# Patient Record
Sex: FEMALE | Race: BLACK OR AFRICAN AMERICAN | NOT HISPANIC OR LATINO | URBAN - METROPOLITAN AREA
[De-identification: names, ages, dates, MRNs, and addresses within clinical notes are randomized per-mention and may not be internally consistent; named-entity substitution may affect disease eponyms.]

---

## 2017-04-18 ENCOUNTER — EMERGENCY (EMERGENCY)
Facility: HOSPITAL | Age: 42
LOS: 1 days | Discharge: HOME | End: 2017-04-18
Attending: EMERGENCY MEDICINE
Payer: COMMERCIAL

## 2017-04-18 VITALS
TEMPERATURE: 98 F | DIASTOLIC BLOOD PRESSURE: 91 MMHG | OXYGEN SATURATION: 98 % | WEIGHT: 225.09 LBS | HEIGHT: 64 IN | SYSTOLIC BLOOD PRESSURE: 124 MMHG | RESPIRATION RATE: 18 BRPM | HEART RATE: 96 BPM

## 2017-04-18 DIAGNOSIS — Z90.49 ACQUIRED ABSENCE OF OTHER SPECIFIED PARTS OF DIGESTIVE TRACT: ICD-10-CM

## 2017-04-18 DIAGNOSIS — R07.2 PRECORDIAL PAIN: ICD-10-CM

## 2017-04-18 DIAGNOSIS — I10 ESSENTIAL (PRIMARY) HYPERTENSION: ICD-10-CM

## 2017-04-18 DIAGNOSIS — I25.10 ATHEROSCLEROTIC HEART DISEASE OF NATIVE CORONARY ARTERY WITHOUT ANGINA PECTORIS: ICD-10-CM

## 2017-04-18 DIAGNOSIS — E78.00 PURE HYPERCHOLESTEROLEMIA, UNSPECIFIED: ICD-10-CM

## 2017-04-18 DIAGNOSIS — Z95.810 PRESENCE OF AUTOMATIC (IMPLANTABLE) CARDIAC DEFIBRILLATOR: ICD-10-CM

## 2017-04-18 DIAGNOSIS — E11.9 TYPE 2 DIABETES MELLITUS WITHOUT COMPLICATIONS: ICD-10-CM

## 2017-04-18 DIAGNOSIS — Z90.49 ACQUIRED ABSENCE OF OTHER SPECIFIED PARTS OF DIGESTIVE TRACT: Chronic | ICD-10-CM

## 2017-04-18 DIAGNOSIS — J45.909 UNSPECIFIED ASTHMA, UNCOMPLICATED: ICD-10-CM

## 2017-04-18 LAB
ALBUMIN SERPL ELPH-MCNC: 3.2 G/DL — LOW (ref 3.5–5)
ALP SERPL-CCNC: 66 U/L — SIGNIFICANT CHANGE UP (ref 40–120)
ALT FLD-CCNC: 26 U/L DA — SIGNIFICANT CHANGE UP (ref 10–60)
ANION GAP SERPL CALC-SCNC: 10 MMOL/L — SIGNIFICANT CHANGE UP (ref 5–17)
APTT BLD: 25.6 SEC — LOW (ref 27.5–37.4)
AST SERPL-CCNC: 29 U/L — SIGNIFICANT CHANGE UP (ref 10–40)
BASOPHILS # BLD AUTO: 0.1 K/UL — SIGNIFICANT CHANGE UP (ref 0–0.2)
BASOPHILS NFR BLD AUTO: 1.7 % — SIGNIFICANT CHANGE UP (ref 0–2)
BILIRUB SERPL-MCNC: 0.5 MG/DL — SIGNIFICANT CHANGE UP (ref 0.2–1.2)
BUN SERPL-MCNC: 22 MG/DL — HIGH (ref 7–18)
CALCIUM SERPL-MCNC: 8.6 MG/DL — SIGNIFICANT CHANGE UP (ref 8.4–10.5)
CHLORIDE SERPL-SCNC: 107 MMOL/L — SIGNIFICANT CHANGE UP (ref 96–108)
CK MB BLD-MCNC: 1.1 % — SIGNIFICANT CHANGE UP (ref 0–3.5)
CK MB CFR SERPL CALC: 1.4 NG/ML — SIGNIFICANT CHANGE UP (ref 0–3.6)
CK SERPL-CCNC: 129 U/L — SIGNIFICANT CHANGE UP (ref 21–215)
CO2 SERPL-SCNC: 21 MMOL/L — LOW (ref 22–31)
CREAT SERPL-MCNC: 0.96 MG/DL — SIGNIFICANT CHANGE UP (ref 0.5–1.3)
D DIMER BLD IA.RAPID-MCNC: <150 NG/ML DDU — SIGNIFICANT CHANGE UP
EOSINOPHIL # BLD AUTO: 0.2 K/UL — SIGNIFICANT CHANGE UP (ref 0–0.5)
EOSINOPHIL NFR BLD AUTO: 3.3 % — SIGNIFICANT CHANGE UP (ref 0–6)
GLUCOSE SERPL-MCNC: 188 MG/DL — HIGH (ref 70–99)
HCG UR QL: NEGATIVE — SIGNIFICANT CHANGE UP
HCT VFR BLD CALC: 35.4 % — SIGNIFICANT CHANGE UP (ref 34.5–45)
HGB BLD-MCNC: 11.6 G/DL — SIGNIFICANT CHANGE UP (ref 11.5–15.5)
INR BLD: 0.89 RATIO — SIGNIFICANT CHANGE UP (ref 0.88–1.16)
LYMPHOCYTES # BLD AUTO: 2 K/UL — SIGNIFICANT CHANGE UP (ref 1–3.3)
LYMPHOCYTES # BLD AUTO: 41.9 % — SIGNIFICANT CHANGE UP (ref 13–44)
MCHC RBC-ENTMCNC: 23.5 PG — LOW (ref 27–34)
MCHC RBC-ENTMCNC: 32.8 GM/DL — SIGNIFICANT CHANGE UP (ref 32–36)
MCV RBC AUTO: 71.8 FL — LOW (ref 80–100)
MONOCYTES # BLD AUTO: 0.3 K/UL — SIGNIFICANT CHANGE UP (ref 0–0.9)
MONOCYTES NFR BLD AUTO: 6.3 % — SIGNIFICANT CHANGE UP (ref 2–14)
NEUTROPHILS # BLD AUTO: 2.3 K/UL — SIGNIFICANT CHANGE UP (ref 1.8–7.4)
NEUTROPHILS NFR BLD AUTO: 46.8 % — SIGNIFICANT CHANGE UP (ref 43–77)
PLATELET # BLD AUTO: 288 K/UL — SIGNIFICANT CHANGE UP (ref 150–400)
POTASSIUM SERPL-MCNC: 4.9 MMOL/L — SIGNIFICANT CHANGE UP (ref 3.5–5.3)
POTASSIUM SERPL-SCNC: 4.9 MMOL/L — SIGNIFICANT CHANGE UP (ref 3.5–5.3)
PROT SERPL-MCNC: 7.5 G/DL — SIGNIFICANT CHANGE UP (ref 6–8.3)
PROTHROM AB SERPL-ACNC: 9.7 SEC — LOW (ref 9.8–12.7)
RBC # BLD: 4.93 M/UL — SIGNIFICANT CHANGE UP (ref 3.8–5.2)
RBC # FLD: 14.3 % — SIGNIFICANT CHANGE UP (ref 10.3–14.5)
SODIUM SERPL-SCNC: 138 MMOL/L — SIGNIFICANT CHANGE UP (ref 135–145)
TROPONIN I SERPL-MCNC: <0.015 NG/ML — SIGNIFICANT CHANGE UP (ref 0–0.04)
TROPONIN I SERPL-MCNC: <0.015 NG/ML — SIGNIFICANT CHANGE UP (ref 0–0.04)
WBC # BLD: 4.8 K/UL — SIGNIFICANT CHANGE UP (ref 3.8–10.5)
WBC # FLD AUTO: 4.8 K/UL — SIGNIFICANT CHANGE UP (ref 3.8–10.5)

## 2017-04-18 PROCEDURE — 99285 EMERGENCY DEPT VISIT HI MDM: CPT

## 2017-04-18 PROCEDURE — 71275 CT ANGIOGRAPHY CHEST: CPT | Mod: 26

## 2017-04-18 RX ORDER — ASPIRIN/CALCIUM CARB/MAGNESIUM 324 MG
81 TABLET ORAL ONCE
Qty: 0 | Refills: 0 | Status: COMPLETED | OUTPATIENT
Start: 2017-04-18 | End: 2017-04-18

## 2017-04-18 RX ORDER — MORPHINE SULFATE 50 MG/1
6 CAPSULE, EXTENDED RELEASE ORAL ONCE
Qty: 0 | Refills: 0 | Status: DISCONTINUED | OUTPATIENT
Start: 2017-04-18 | End: 2017-04-18

## 2017-04-18 RX ORDER — ONDANSETRON 8 MG/1
1 TABLET, FILM COATED ORAL
Qty: 12 | Refills: 0 | OUTPATIENT
Start: 2017-04-18

## 2017-04-18 RX ADMIN — MORPHINE SULFATE 6 MILLIGRAM(S): 50 CAPSULE, EXTENDED RELEASE ORAL at 23:59

## 2017-04-18 RX ADMIN — MORPHINE SULFATE 6 MILLIGRAM(S): 50 CAPSULE, EXTENDED RELEASE ORAL at 18:43

## 2017-04-18 NOTE — ED ADULT NURSE REASSESSMENT NOTE - NS ED NURSE REASSESS COMMENT FT1
2121 - completed  ct scan of the chest tolerated procedure well.
2055 pm - pt awaiting for ct scan of the chest with IV

## 2017-04-18 NOTE — ED ADULT NURSE NOTE - OBJECTIVE STATEMENT
pt came in with c/o chest pain started yesterday then went away last night . then today pain came back midsternal pain radiating to her  rt arm . pt states shes also coughing with white phlegm x 2 days. clear breath sound with equal chest expansion no signs of any distress

## 2017-04-18 NOTE — ED ADULT NURSE NOTE - PSH
AICD (automatic cardioverter/defibrillator) present  2013  Benign carcinoid tumor of appendix    Benign carcinoid tumor of appendix    Coronary heart disease  s/p AICD PLACEMENT  Gallbladder obstruction    Gallbladder obstruction    History of cholecystectomy    S/P appendectomy    S/P cardiac cath  s/p stents 2011  S/P cholecystectomy    S/P ICD (internal cardiac defibrillator) procedure

## 2017-04-18 NOTE — ED PROVIDER NOTE - MEDICAL DECISION MAKING DETAILS
Case dw pts cardiologist Dr Leger, informed pt with normal recent cardiac cath stable for discharge and will f/u with pt in office. Pt is well appearing walking with normal gait, stable for discharge and follow up with medical doctor. Pt educated on care and need for follow up. Discussed anticipatory guidance and return precautions. Questions answered. I had a detailed discussion with the patient and/or guardian regarding the historical points, exam findings, and any diagnostic results supporting the discharge diagnosis.

## 2017-04-18 NOTE — ED ADULT NURSE NOTE - PMH
AICD (automatic cardioverter/defibrillator) (Medtronic)    AICD (automatic cardioverter/defibrillator) present    Asthma    Asthma    CAD (coronary artery disease)    CAD (coronary artery disease)  s/p 3 stents, last stent in may 2014  Cardiomyopathy    Cardiomyopathy    Diabetes    DM (diabetes mellitus)    DM type 2 (diabetes mellitus, type 2)    Hepatitis B    HLD (hyperlipidemia)    HTN (hypertension)    HTN (hypertension)    Hypercholesterolemia    Hypertension    PE (pulmonary embolism)    Personal history of PE (pulmonary embolism)    Postpartum cardiomyopathy    Stented coronary artery

## 2017-04-18 NOTE — ED PROVIDER NOTE - OBJECTIVE STATEMENT
41 y/o female with PMHx of PE, AICD, Asthma, CAD, Cardiomyopathy (with ejection fraction of 20%) presents to the ED for mid sternal CP x 2 days. Pt denies fever, chills, SOB, vomiting, or any other complaints. NKDA. Meds: Eliquis, Coreg, Lisinopril, Metformin, Norvasc. PMD: Dr. Velazquez.

## 2017-04-18 NOTE — ED PROVIDER NOTE - NS ED MD SCRIBE ATTENDING SCRIBE SECTIONS
HIV/VITAL SIGNS( Pullset)/HISTORY OF PRESENT ILLNESS/PROGRESS NOTE/DISPOSITION/REVIEW OF SYSTEMS/PHYSICAL EXAM

## 2017-04-19 PROCEDURE — 80053 COMPREHEN METABOLIC PANEL: CPT

## 2017-04-19 PROCEDURE — 81025 URINE PREGNANCY TEST: CPT

## 2017-04-19 PROCEDURE — 84702 CHORIONIC GONADOTROPIN TEST: CPT

## 2017-04-19 PROCEDURE — 85610 PROTHROMBIN TIME: CPT

## 2017-04-19 PROCEDURE — 85027 COMPLETE CBC AUTOMATED: CPT

## 2017-04-19 PROCEDURE — 99284 EMERGENCY DEPT VISIT MOD MDM: CPT | Mod: 25

## 2017-04-19 PROCEDURE — 82550 ASSAY OF CK (CPK): CPT

## 2017-04-19 PROCEDURE — 96374 THER/PROPH/DIAG INJ IV PUSH: CPT

## 2017-04-19 PROCEDURE — 82553 CREATINE MB FRACTION: CPT

## 2017-04-19 PROCEDURE — 93005 ELECTROCARDIOGRAM TRACING: CPT

## 2017-04-19 PROCEDURE — 85379 FIBRIN DEGRADATION QUANT: CPT

## 2017-04-19 PROCEDURE — 85730 THROMBOPLASTIN TIME PARTIAL: CPT

## 2017-04-19 PROCEDURE — 71275 CT ANGIOGRAPHY CHEST: CPT

## 2017-04-19 PROCEDURE — 84484 ASSAY OF TROPONIN QUANT: CPT

## 2017-04-19 RX ORDER — ONDANSETRON 8 MG/1
4 TABLET, FILM COATED ORAL ONCE
Qty: 0 | Refills: 0 | Status: COMPLETED | OUTPATIENT
Start: 2017-04-19 | End: 2017-04-19

## 2017-04-19 RX ADMIN — ONDANSETRON 4 MILLIGRAM(S): 8 TABLET, FILM COATED ORAL at 00:03

## 2017-04-19 RX ADMIN — Medication 81 MILLIGRAM(S): at 00:02

## 2017-04-30 ENCOUNTER — INPATIENT (INPATIENT)
Facility: HOSPITAL | Age: 42
LOS: 0 days | Discharge: HOME | End: 2017-05-01
Attending: INTERNAL MEDICINE

## 2017-04-30 DIAGNOSIS — Z90.49 ACQUIRED ABSENCE OF OTHER SPECIFIED PARTS OF DIGESTIVE TRACT: Chronic | ICD-10-CM

## 2017-04-30 DIAGNOSIS — I50.1 LEFT VENTRICULAR FAILURE, UNSPECIFIED: ICD-10-CM

## 2017-05-23 ENCOUNTER — EMERGENCY (EMERGENCY)
Facility: HOSPITAL | Age: 42
LOS: 1 days | Discharge: HOME | End: 2017-05-23
Attending: EMERGENCY MEDICINE
Payer: COMMERCIAL

## 2017-05-23 VITALS
OXYGEN SATURATION: 100 % | HEART RATE: 104 BPM | DIASTOLIC BLOOD PRESSURE: 87 MMHG | WEIGHT: 240.08 LBS | RESPIRATION RATE: 20 BRPM | HEIGHT: 66 IN | TEMPERATURE: 98 F | SYSTOLIC BLOOD PRESSURE: 132 MMHG

## 2017-05-23 VITALS
HEART RATE: 96 BPM | DIASTOLIC BLOOD PRESSURE: 86 MMHG | RESPIRATION RATE: 20 BRPM | SYSTOLIC BLOOD PRESSURE: 126 MMHG | TEMPERATURE: 98 F | OXYGEN SATURATION: 100 %

## 2017-05-23 DIAGNOSIS — Z90.49 ACQUIRED ABSENCE OF OTHER SPECIFIED PARTS OF DIGESTIVE TRACT: Chronic | ICD-10-CM

## 2017-05-23 LAB
ALBUMIN SERPL ELPH-MCNC: 3.6 G/DL — SIGNIFICANT CHANGE UP (ref 3.5–5)
ALP SERPL-CCNC: 75 U/L — SIGNIFICANT CHANGE UP (ref 40–120)
ALT FLD-CCNC: 24 U/L DA — SIGNIFICANT CHANGE UP (ref 10–60)
ANION GAP SERPL CALC-SCNC: 10 MMOL/L — SIGNIFICANT CHANGE UP (ref 5–17)
APPEARANCE UR: ABNORMAL
APTT BLD: 23.7 SEC — LOW (ref 27.5–37.4)
AST SERPL-CCNC: 16 U/L — SIGNIFICANT CHANGE UP (ref 10–40)
BASOPHILS # BLD AUTO: 0.1 K/UL — SIGNIFICANT CHANGE UP (ref 0–0.2)
BASOPHILS NFR BLD AUTO: 1.5 % — SIGNIFICANT CHANGE UP (ref 0–2)
BILIRUB SERPL-MCNC: 0.8 MG/DL — SIGNIFICANT CHANGE UP (ref 0.2–1.2)
BILIRUB UR-MCNC: NEGATIVE — SIGNIFICANT CHANGE UP
BUN SERPL-MCNC: 17 MG/DL — SIGNIFICANT CHANGE UP (ref 7–18)
CALCIUM SERPL-MCNC: 9.3 MG/DL — SIGNIFICANT CHANGE UP (ref 8.4–10.5)
CHLORIDE SERPL-SCNC: 104 MMOL/L — SIGNIFICANT CHANGE UP (ref 96–108)
CO2 SERPL-SCNC: 25 MMOL/L — SIGNIFICANT CHANGE UP (ref 22–31)
COLOR SPEC: YELLOW — SIGNIFICANT CHANGE UP
CREAT SERPL-MCNC: 1.01 MG/DL — SIGNIFICANT CHANGE UP (ref 0.5–1.3)
DIFF PNL FLD: ABNORMAL
EOSINOPHIL # BLD AUTO: 0.2 K/UL — SIGNIFICANT CHANGE UP (ref 0–0.5)
EOSINOPHIL NFR BLD AUTO: 3.1 % — SIGNIFICANT CHANGE UP (ref 0–6)
GLUCOSE SERPL-MCNC: 186 MG/DL — HIGH (ref 70–99)
GLUCOSE UR QL: NEGATIVE — SIGNIFICANT CHANGE UP
HCG UR QL: NEGATIVE — SIGNIFICANT CHANGE UP
HCT VFR BLD CALC: 37.8 % — SIGNIFICANT CHANGE UP (ref 34.5–45)
HGB BLD-MCNC: 12.1 G/DL — SIGNIFICANT CHANGE UP (ref 11.5–15.5)
INR BLD: 0.95 RATIO — SIGNIFICANT CHANGE UP (ref 0.88–1.16)
KETONES UR-MCNC: NEGATIVE — SIGNIFICANT CHANGE UP
LACTATE SERPL-SCNC: 2.5 MMOL/L — HIGH (ref 0.7–2)
LEUKOCYTE ESTERASE UR-ACNC: ABNORMAL
LYMPHOCYTES # BLD AUTO: 2.2 K/UL — SIGNIFICANT CHANGE UP (ref 1–3.3)
LYMPHOCYTES # BLD AUTO: 35.9 % — SIGNIFICANT CHANGE UP (ref 13–44)
MCHC RBC-ENTMCNC: 23.4 PG — LOW (ref 27–34)
MCHC RBC-ENTMCNC: 32 GM/DL — SIGNIFICANT CHANGE UP (ref 32–36)
MCV RBC AUTO: 73.2 FL — LOW (ref 80–100)
MONOCYTES # BLD AUTO: 0.3 K/UL — SIGNIFICANT CHANGE UP (ref 0–0.9)
MONOCYTES NFR BLD AUTO: 4.6 % — SIGNIFICANT CHANGE UP (ref 2–14)
NEUTROPHILS # BLD AUTO: 3.3 K/UL — SIGNIFICANT CHANGE UP (ref 1.8–7.4)
NEUTROPHILS NFR BLD AUTO: 54.9 % — SIGNIFICANT CHANGE UP (ref 43–77)
NITRITE UR-MCNC: NEGATIVE — SIGNIFICANT CHANGE UP
PH UR: 6 — SIGNIFICANT CHANGE UP (ref 5–8)
PLATELET # BLD AUTO: 305 K/UL — SIGNIFICANT CHANGE UP (ref 150–400)
POTASSIUM SERPL-MCNC: 4.1 MMOL/L — SIGNIFICANT CHANGE UP (ref 3.5–5.3)
POTASSIUM SERPL-SCNC: 4.1 MMOL/L — SIGNIFICANT CHANGE UP (ref 3.5–5.3)
PROT SERPL-MCNC: 7.7 G/DL — SIGNIFICANT CHANGE UP (ref 6–8.3)
PROT UR-MCNC: 100
PROTHROM AB SERPL-ACNC: 10.3 SEC — SIGNIFICANT CHANGE UP (ref 9.8–12.7)
RBC # BLD: 5.17 M/UL — SIGNIFICANT CHANGE UP (ref 3.8–5.2)
RBC # FLD: 13.8 % — SIGNIFICANT CHANGE UP (ref 10.3–14.5)
SODIUM SERPL-SCNC: 139 MMOL/L — SIGNIFICANT CHANGE UP (ref 135–145)
SP GR SPEC: 1.02 — SIGNIFICANT CHANGE UP (ref 1.01–1.02)
UROBILINOGEN FLD QL: NEGATIVE — SIGNIFICANT CHANGE UP
WBC # BLD: 6 K/UL — SIGNIFICANT CHANGE UP (ref 3.8–10.5)
WBC # FLD AUTO: 6 K/UL — SIGNIFICANT CHANGE UP (ref 3.8–10.5)

## 2017-05-23 PROCEDURE — 85027 COMPLETE CBC AUTOMATED: CPT

## 2017-05-23 PROCEDURE — 85610 PROTHROMBIN TIME: CPT

## 2017-05-23 PROCEDURE — 74176 CT ABD & PELVIS W/O CONTRAST: CPT

## 2017-05-23 PROCEDURE — 87086 URINE CULTURE/COLONY COUNT: CPT

## 2017-05-23 PROCEDURE — 83605 ASSAY OF LACTIC ACID: CPT

## 2017-05-23 PROCEDURE — 74176 CT ABD & PELVIS W/O CONTRAST: CPT | Mod: 26

## 2017-05-23 PROCEDURE — 96375 TX/PRO/DX INJ NEW DRUG ADDON: CPT

## 2017-05-23 PROCEDURE — 96374 THER/PROPH/DIAG INJ IV PUSH: CPT

## 2017-05-23 PROCEDURE — 85730 THROMBOPLASTIN TIME PARTIAL: CPT

## 2017-05-23 PROCEDURE — 99285 EMERGENCY DEPT VISIT HI MDM: CPT | Mod: 25

## 2017-05-23 PROCEDURE — 81001 URINALYSIS AUTO W/SCOPE: CPT

## 2017-05-23 PROCEDURE — 80053 COMPREHEN METABOLIC PANEL: CPT

## 2017-05-23 PROCEDURE — 99285 EMERGENCY DEPT VISIT HI MDM: CPT

## 2017-05-23 PROCEDURE — 87186 SC STD MICRODIL/AGAR DIL: CPT

## 2017-05-23 PROCEDURE — 81025 URINE PREGNANCY TEST: CPT

## 2017-05-23 PROCEDURE — 99284 EMERGENCY DEPT VISIT MOD MDM: CPT | Mod: 25

## 2017-05-23 RX ORDER — CEFPODOXIME PROXETIL 100 MG
1 TABLET ORAL
Qty: 28 | Refills: 0 | OUTPATIENT
Start: 2017-05-23 | End: 2017-06-06

## 2017-05-23 RX ORDER — MORPHINE SULFATE 50 MG/1
4 CAPSULE, EXTENDED RELEASE ORAL ONCE
Qty: 0 | Refills: 0 | Status: DISCONTINUED | OUTPATIENT
Start: 2017-05-23 | End: 2017-05-23

## 2017-05-23 RX ORDER — ONDANSETRON 8 MG/1
4 TABLET, FILM COATED ORAL ONCE
Qty: 0 | Refills: 0 | Status: COMPLETED | OUTPATIENT
Start: 2017-05-23 | End: 2017-05-23

## 2017-05-23 RX ORDER — CEFTRIAXONE 500 MG/1
1 INJECTION, POWDER, FOR SOLUTION INTRAMUSCULAR; INTRAVENOUS ONCE
Qty: 0 | Refills: 0 | Status: COMPLETED | OUTPATIENT
Start: 2017-05-23 | End: 2017-05-23

## 2017-05-23 RX ORDER — ONDANSETRON 8 MG/1
1 TABLET, FILM COATED ORAL
Qty: 12 | Refills: 0 | OUTPATIENT
Start: 2017-05-23

## 2017-05-23 RX ORDER — SODIUM CHLORIDE 9 MG/ML
2000 INJECTION INTRAMUSCULAR; INTRAVENOUS; SUBCUTANEOUS ONCE
Qty: 0 | Refills: 0 | Status: COMPLETED | OUTPATIENT
Start: 2017-05-23 | End: 2017-05-23

## 2017-05-23 RX ADMIN — CEFTRIAXONE 100 GRAM(S): 500 INJECTION, POWDER, FOR SOLUTION INTRAMUSCULAR; INTRAVENOUS at 15:56

## 2017-05-23 RX ADMIN — MORPHINE SULFATE 4 MILLIGRAM(S): 50 CAPSULE, EXTENDED RELEASE ORAL at 15:56

## 2017-05-23 RX ADMIN — SODIUM CHLORIDE 2000 MILLILITER(S): 9 INJECTION INTRAMUSCULAR; INTRAVENOUS; SUBCUTANEOUS at 14:46

## 2017-05-23 RX ADMIN — ONDANSETRON 4 MILLIGRAM(S): 8 TABLET, FILM COATED ORAL at 14:47

## 2017-05-23 RX ADMIN — MORPHINE SULFATE 4 MILLIGRAM(S): 50 CAPSULE, EXTENDED RELEASE ORAL at 14:47

## 2017-05-23 NOTE — ED PROVIDER NOTE - PROGRESS NOTE DETAILS
CT a/p non-acute, shows "Small groundglass pulmonary density in the left lower lobe. Tiny left   lower lobe lung nodule is also noted." in addition to kdiney lesions, printed result provided to pt. d/c'd home with rx for cefpodoxime, zofran

## 2017-05-23 NOTE — ED PROVIDER NOTE - NS ED ROS FT
ROS: GENERAL: no fevers, no chills HEENT: no epistaxis, no eye pain, no ear, no throat pain CARDIAC: no chest pain, no shortness of breath PULM: no cough, no shortness of breath GI: +nausea, no vomiting, +diarrhea,  no abdominal pain, no hematemesis, no bright red blood per rectum : no dysuria, no hematuria, +urgency EXTREMITIES: no arm pain, no leg pain, +flank pain SKIN: no purpura, no petechiae NEURO: no headache, no neck pain, no loss of strength/sensation HEME: no easy bruising, no easy bleeding

## 2017-05-23 NOTE — ED PROVIDER NOTE - PHYSICAL EXAMINATION
PE: CONSTITUTIONAL: Well appearing, well nourished, in milddistress. ENMT: Airway patent, nasal mucosa clear, mouth with normal mucosa. HEAD: NCAT EYES: PERRL, EOMI CARDIAC: RRR, no m/r/g, no pedal edema RESPIRATORY: CTA b/l, no adventitious sounds GI: Abdomen non-distended, non-tender MSK: Spine appears normal, range of motion is not limited, L CVAT NEURO: CNII-XII grossly intact, 5/5 strength, full sensation all extremities, gait intact SKIN: No rash

## 2017-05-23 NOTE — ED PROVIDER NOTE - MEDICAL DECISION MAKING DETAILS
42f pmhx asthma, CAD, HTN, HLD, DM, PE, carcinoid Ca s/p resection p/w L flank pain, urgency. pt states began having urgency and mild dysuria 5 days ago, then developed fever and L flank pain a/w nausea over weekend. L flank, constant, severe, radiating to LLQ at times. endorses diarrhea and gas this AM. on PE, VSS, in mild distress, RRR, CTA b/l, abdo soft, non-TTP, L CVAT. will obtain basics, UA/UCx, CT a/p, morphine/zofran

## 2017-05-26 LAB
-  AMPICILLIN/SULBACTAM: SIGNIFICANT CHANGE UP
-  CEFAZOLIN: SIGNIFICANT CHANGE UP
-  CIPROFLOXACIN: SIGNIFICANT CHANGE UP
-  GENTAMICIN: SIGNIFICANT CHANGE UP
-  LEVOFLOXACIN: SIGNIFICANT CHANGE UP
-  NITROFURANTOIN: SIGNIFICANT CHANGE UP
-  OXACILLIN: SIGNIFICANT CHANGE UP
-  PENICILLIN: SIGNIFICANT CHANGE UP
-  RIFAMPIN: SIGNIFICANT CHANGE UP
-  TETRACYCLINE: SIGNIFICANT CHANGE UP
-  TRIMETHOPRIM/SULFAMETHOXAZOLE: SIGNIFICANT CHANGE UP
-  VANCOMYCIN: SIGNIFICANT CHANGE UP
CULTURE RESULTS: SIGNIFICANT CHANGE UP
METHOD TYPE: SIGNIFICANT CHANGE UP
ORGANISM # SPEC MICROSCOPIC CNT: SIGNIFICANT CHANGE UP
ORGANISM # SPEC MICROSCOPIC CNT: SIGNIFICANT CHANGE UP
SPECIMEN SOURCE: SIGNIFICANT CHANGE UP

## 2017-05-27 DIAGNOSIS — Z86.19 PERSONAL HISTORY OF OTHER INFECTIOUS AND PARASITIC DISEASES: ICD-10-CM

## 2017-05-27 DIAGNOSIS — I25.10 ATHEROSCLEROTIC HEART DISEASE OF NATIVE CORONARY ARTERY WITHOUT ANGINA PECTORIS: ICD-10-CM

## 2017-05-27 DIAGNOSIS — Z95.810 PRESENCE OF AUTOMATIC (IMPLANTABLE) CARDIAC DEFIBRILLATOR: ICD-10-CM

## 2017-05-27 DIAGNOSIS — I10 ESSENTIAL (PRIMARY) HYPERTENSION: ICD-10-CM

## 2017-05-27 DIAGNOSIS — Z88.1 ALLERGY STATUS TO OTHER ANTIBIOTIC AGENTS STATUS: ICD-10-CM

## 2017-05-27 DIAGNOSIS — N12 TUBULO-INTERSTITIAL NEPHRITIS, NOT SPECIFIED AS ACUTE OR CHRONIC: ICD-10-CM

## 2017-05-27 DIAGNOSIS — Z86.711 PERSONAL HISTORY OF PULMONARY EMBOLISM: ICD-10-CM

## 2017-05-27 DIAGNOSIS — E11.9 TYPE 2 DIABETES MELLITUS WITHOUT COMPLICATIONS: ICD-10-CM

## 2017-06-02 ENCOUNTER — INPATIENT (INPATIENT)
Facility: HOSPITAL | Age: 42
LOS: 2 days | Discharge: HOME | DRG: 690 | End: 2017-06-05
Attending: FAMILY MEDICINE | Admitting: FAMILY MEDICINE
Payer: COMMERCIAL

## 2017-06-02 VITALS
DIASTOLIC BLOOD PRESSURE: 89 MMHG | OXYGEN SATURATION: 100 % | RESPIRATION RATE: 17 BRPM | WEIGHT: 240.08 LBS | HEART RATE: 100 BPM | TEMPERATURE: 98 F | SYSTOLIC BLOOD PRESSURE: 133 MMHG | HEIGHT: 66 IN

## 2017-06-02 DIAGNOSIS — I25.10 ATHEROSCLEROTIC HEART DISEASE OF NATIVE CORONARY ARTERY WITHOUT ANGINA PECTORIS: ICD-10-CM

## 2017-06-02 DIAGNOSIS — I42.9 CARDIOMYOPATHY, UNSPECIFIED: ICD-10-CM

## 2017-06-02 DIAGNOSIS — N12 TUBULO-INTERSTITIAL NEPHRITIS, NOT SPECIFIED AS ACUTE OR CHRONIC: ICD-10-CM

## 2017-06-02 DIAGNOSIS — Z86.711 PERSONAL HISTORY OF PULMONARY EMBOLISM: ICD-10-CM

## 2017-06-02 DIAGNOSIS — J45.909 UNSPECIFIED ASTHMA, UNCOMPLICATED: ICD-10-CM

## 2017-06-02 DIAGNOSIS — Z90.49 ACQUIRED ABSENCE OF OTHER SPECIFIED PARTS OF DIGESTIVE TRACT: Chronic | ICD-10-CM

## 2017-06-02 DIAGNOSIS — E11.9 TYPE 2 DIABETES MELLITUS WITHOUT COMPLICATIONS: ICD-10-CM

## 2017-06-02 DIAGNOSIS — Z29.9 ENCOUNTER FOR PROPHYLACTIC MEASURES, UNSPECIFIED: ICD-10-CM

## 2017-06-02 LAB
ALBUMIN SERPL ELPH-MCNC: 3.2 G/DL — LOW (ref 3.5–5)
ALP SERPL-CCNC: 74 U/L — SIGNIFICANT CHANGE UP (ref 40–120)
ALT FLD-CCNC: 23 U/L DA — SIGNIFICANT CHANGE UP (ref 10–60)
ANION GAP SERPL CALC-SCNC: 10 MMOL/L — SIGNIFICANT CHANGE UP (ref 5–17)
APPEARANCE UR: CLEAR — SIGNIFICANT CHANGE UP
AST SERPL-CCNC: 12 U/L — SIGNIFICANT CHANGE UP (ref 10–40)
BASOPHILS # BLD AUTO: 0.1 K/UL — SIGNIFICANT CHANGE UP (ref 0–0.2)
BASOPHILS NFR BLD AUTO: 1 % — SIGNIFICANT CHANGE UP (ref 0–2)
BILIRUB SERPL-MCNC: 0.6 MG/DL — SIGNIFICANT CHANGE UP (ref 0.2–1.2)
BILIRUB UR-MCNC: NEGATIVE — SIGNIFICANT CHANGE UP
BUN SERPL-MCNC: 13 MG/DL — SIGNIFICANT CHANGE UP (ref 7–18)
CALCIUM SERPL-MCNC: 8.7 MG/DL — SIGNIFICANT CHANGE UP (ref 8.4–10.5)
CHLORIDE SERPL-SCNC: 102 MMOL/L — SIGNIFICANT CHANGE UP (ref 96–108)
CO2 SERPL-SCNC: 22 MMOL/L — SIGNIFICANT CHANGE UP (ref 22–31)
COLOR SPEC: YELLOW — SIGNIFICANT CHANGE UP
CREAT SERPL-MCNC: 0.96 MG/DL — SIGNIFICANT CHANGE UP (ref 0.5–1.3)
DIFF PNL FLD: NEGATIVE — SIGNIFICANT CHANGE UP
EOSINOPHIL # BLD AUTO: 0.2 K/UL — SIGNIFICANT CHANGE UP (ref 0–0.5)
EOSINOPHIL NFR BLD AUTO: 4.2 % — SIGNIFICANT CHANGE UP (ref 0–6)
GLUCOSE SERPL-MCNC: 327 MG/DL — HIGH (ref 70–99)
GLUCOSE UR QL: 1000 MG/DL
HCG UR QL: NEGATIVE — SIGNIFICANT CHANGE UP
HCT VFR BLD CALC: 34.1 % — LOW (ref 34.5–45)
HGB BLD-MCNC: 11.3 G/DL — LOW (ref 11.5–15.5)
KETONES UR-MCNC: ABNORMAL
LEUKOCYTE ESTERASE UR-ACNC: NEGATIVE — SIGNIFICANT CHANGE UP
LIDOCAIN IGE QN: 255 U/L — SIGNIFICANT CHANGE UP (ref 73–393)
LYMPHOCYTES # BLD AUTO: 1.4 K/UL — SIGNIFICANT CHANGE UP (ref 1–3.3)
LYMPHOCYTES # BLD AUTO: 29.1 % — SIGNIFICANT CHANGE UP (ref 13–44)
MCHC RBC-ENTMCNC: 24.9 PG — LOW (ref 27–34)
MCHC RBC-ENTMCNC: 33.1 GM/DL — SIGNIFICANT CHANGE UP (ref 32–36)
MCV RBC AUTO: 75.1 FL — LOW (ref 80–100)
MONOCYTES # BLD AUTO: 0.3 K/UL — SIGNIFICANT CHANGE UP (ref 0–0.9)
MONOCYTES NFR BLD AUTO: 6.8 % — SIGNIFICANT CHANGE UP (ref 2–14)
NEUTROPHILS # BLD AUTO: 2.9 K/UL — SIGNIFICANT CHANGE UP (ref 1.8–7.4)
NEUTROPHILS NFR BLD AUTO: 58.9 % — SIGNIFICANT CHANGE UP (ref 43–77)
NITRITE UR-MCNC: NEGATIVE — SIGNIFICANT CHANGE UP
PH UR: 6 — SIGNIFICANT CHANGE UP (ref 5–8)
PLATELET # BLD AUTO: 202 K/UL — SIGNIFICANT CHANGE UP (ref 150–400)
POTASSIUM SERPL-MCNC: 4.2 MMOL/L — SIGNIFICANT CHANGE UP (ref 3.5–5.3)
POTASSIUM SERPL-SCNC: 4.2 MMOL/L — SIGNIFICANT CHANGE UP (ref 3.5–5.3)
PROT SERPL-MCNC: 7.2 G/DL — SIGNIFICANT CHANGE UP (ref 6–8.3)
PROT UR-MCNC: 30 MG/DL
RBC # BLD: 4.54 M/UL — SIGNIFICANT CHANGE UP (ref 3.8–5.2)
RBC # FLD: 14.3 % — SIGNIFICANT CHANGE UP (ref 10.3–14.5)
SODIUM SERPL-SCNC: 134 MMOL/L — LOW (ref 135–145)
SP GR SPEC: 1.02 — SIGNIFICANT CHANGE UP (ref 1.01–1.02)
UROBILINOGEN FLD QL: NEGATIVE — SIGNIFICANT CHANGE UP
WBC # BLD: 5 K/UL — SIGNIFICANT CHANGE UP (ref 3.8–10.5)
WBC # FLD AUTO: 5 K/UL — SIGNIFICANT CHANGE UP (ref 3.8–10.5)

## 2017-06-02 PROCEDURE — 74176 CT ABD & PELVIS W/O CONTRAST: CPT | Mod: 26

## 2017-06-02 PROCEDURE — 99285 EMERGENCY DEPT VISIT HI MDM: CPT

## 2017-06-02 RX ORDER — INSULIN LISPRO 100/ML
VIAL (ML) SUBCUTANEOUS
Qty: 0 | Refills: 0 | Status: DISCONTINUED | OUTPATIENT
Start: 2017-06-02 | End: 2017-06-05

## 2017-06-02 RX ORDER — APIXABAN 2.5 MG/1
5 TABLET, FILM COATED ORAL
Qty: 0 | Refills: 0 | Status: DISCONTINUED | OUTPATIENT
Start: 2017-06-02 | End: 2017-06-05

## 2017-06-02 RX ORDER — CEFAZOLIN SODIUM 1 G
1000 VIAL (EA) INJECTION EVERY 8 HOURS
Qty: 0 | Refills: 0 | Status: DISCONTINUED | OUTPATIENT
Start: 2017-06-02 | End: 2017-06-05

## 2017-06-02 RX ORDER — VANCOMYCIN HCL 1 G
VIAL (EA) INTRAVENOUS
Qty: 0 | Refills: 0 | Status: DISCONTINUED | OUTPATIENT
Start: 2017-06-02 | End: 2017-06-02

## 2017-06-02 RX ORDER — DEXTROSE 50 % IN WATER 50 %
25 SYRINGE (ML) INTRAVENOUS ONCE
Qty: 0 | Refills: 0 | Status: DISCONTINUED | OUTPATIENT
Start: 2017-06-02 | End: 2017-06-05

## 2017-06-02 RX ORDER — DEXTROSE 50 % IN WATER 50 %
12.5 SYRINGE (ML) INTRAVENOUS ONCE
Qty: 0 | Refills: 0 | Status: DISCONTINUED | OUTPATIENT
Start: 2017-06-02 | End: 2017-06-05

## 2017-06-02 RX ORDER — ACETAMINOPHEN 500 MG
650 TABLET ORAL EVERY 6 HOURS
Qty: 0 | Refills: 0 | Status: DISCONTINUED | OUTPATIENT
Start: 2017-06-02 | End: 2017-06-05

## 2017-06-02 RX ORDER — SODIUM CHLORIDE 9 MG/ML
1000 INJECTION INTRAMUSCULAR; INTRAVENOUS; SUBCUTANEOUS ONCE
Qty: 0 | Refills: 0 | Status: COMPLETED | OUTPATIENT
Start: 2017-06-02 | End: 2017-06-02

## 2017-06-02 RX ORDER — FLUCONAZOLE 150 MG/1
150 TABLET ORAL ONCE
Qty: 0 | Refills: 0 | Status: COMPLETED | OUTPATIENT
Start: 2017-06-02 | End: 2017-06-02

## 2017-06-02 RX ORDER — DEXTROSE 50 % IN WATER 50 %
1 SYRINGE (ML) INTRAVENOUS ONCE
Qty: 0 | Refills: 0 | Status: DISCONTINUED | OUTPATIENT
Start: 2017-06-02 | End: 2017-06-05

## 2017-06-02 RX ORDER — GLUCAGON INJECTION, SOLUTION 0.5 MG/.1ML
1 INJECTION, SOLUTION SUBCUTANEOUS ONCE
Qty: 0 | Refills: 0 | Status: DISCONTINUED | OUTPATIENT
Start: 2017-06-02 | End: 2017-06-05

## 2017-06-02 RX ORDER — IPRATROPIUM/ALBUTEROL SULFATE 18-103MCG
3 AEROSOL WITH ADAPTER (GRAM) INHALATION EVERY 6 HOURS
Qty: 0 | Refills: 0 | Status: DISCONTINUED | OUTPATIENT
Start: 2017-06-02 | End: 2017-06-04

## 2017-06-02 RX ORDER — CARVEDILOL PHOSPHATE 80 MG/1
12.5 CAPSULE, EXTENDED RELEASE ORAL EVERY 12 HOURS
Qty: 0 | Refills: 0 | Status: DISCONTINUED | OUTPATIENT
Start: 2017-06-02 | End: 2017-06-05

## 2017-06-02 RX ORDER — MORPHINE SULFATE 50 MG/1
4 CAPSULE, EXTENDED RELEASE ORAL ONCE
Qty: 0 | Refills: 0 | Status: DISCONTINUED | OUTPATIENT
Start: 2017-06-02 | End: 2017-06-02

## 2017-06-02 RX ORDER — SODIUM CHLORIDE 9 MG/ML
1000 INJECTION, SOLUTION INTRAVENOUS
Qty: 0 | Refills: 0 | Status: DISCONTINUED | OUTPATIENT
Start: 2017-06-02 | End: 2017-06-05

## 2017-06-02 RX ORDER — CEFTRIAXONE 500 MG/1
1 INJECTION, POWDER, FOR SOLUTION INTRAMUSCULAR; INTRAVENOUS ONCE
Qty: 0 | Refills: 0 | Status: COMPLETED | OUTPATIENT
Start: 2017-06-02 | End: 2017-06-02

## 2017-06-02 RX ORDER — ASPIRIN/CALCIUM CARB/MAGNESIUM 324 MG
81 TABLET ORAL DAILY
Qty: 0 | Refills: 0 | Status: DISCONTINUED | OUTPATIENT
Start: 2017-06-02 | End: 2017-06-05

## 2017-06-02 RX ORDER — CEFAZOLIN SODIUM 1 G
VIAL (EA) INJECTION
Qty: 0 | Refills: 0 | Status: DISCONTINUED | OUTPATIENT
Start: 2017-06-02 | End: 2017-06-05

## 2017-06-02 RX ORDER — CEFAZOLIN SODIUM 1 G
1000 VIAL (EA) INJECTION ONCE
Qty: 0 | Refills: 0 | Status: COMPLETED | OUTPATIENT
Start: 2017-06-02 | End: 2017-06-02

## 2017-06-02 RX ORDER — LISINOPRIL 2.5 MG/1
10 TABLET ORAL DAILY
Qty: 0 | Refills: 0 | Status: DISCONTINUED | OUTPATIENT
Start: 2017-06-02 | End: 2017-06-05

## 2017-06-02 RX ADMIN — Medication 650 MILLIGRAM(S): at 16:09

## 2017-06-02 RX ADMIN — LISINOPRIL 10 MILLIGRAM(S): 2.5 TABLET ORAL at 18:32

## 2017-06-02 RX ADMIN — CEFTRIAXONE 100 GRAM(S): 500 INJECTION, POWDER, FOR SOLUTION INTRAMUSCULAR; INTRAVENOUS at 14:32

## 2017-06-02 RX ADMIN — APIXABAN 5 MILLIGRAM(S): 2.5 TABLET, FILM COATED ORAL at 18:32

## 2017-06-02 RX ADMIN — MORPHINE SULFATE 4 MILLIGRAM(S): 50 CAPSULE, EXTENDED RELEASE ORAL at 13:56

## 2017-06-02 RX ADMIN — Medication 3: at 17:06

## 2017-06-02 RX ADMIN — Medication 650 MILLIGRAM(S): at 16:58

## 2017-06-02 RX ADMIN — FLUCONAZOLE 150 MILLIGRAM(S): 150 TABLET ORAL at 18:31

## 2017-06-02 RX ADMIN — CARVEDILOL PHOSPHATE 12.5 MILLIGRAM(S): 80 CAPSULE, EXTENDED RELEASE ORAL at 18:32

## 2017-06-02 RX ADMIN — MORPHINE SULFATE 4 MILLIGRAM(S): 50 CAPSULE, EXTENDED RELEASE ORAL at 11:02

## 2017-06-02 RX ADMIN — SODIUM CHLORIDE 1000 MILLILITER(S): 9 INJECTION INTRAMUSCULAR; INTRAVENOUS; SUBCUTANEOUS at 11:01

## 2017-06-02 RX ADMIN — Medication 100 MILLIGRAM(S): at 18:32

## 2017-06-02 RX ADMIN — Medication 81 MILLIGRAM(S): at 18:32

## 2017-06-02 RX ADMIN — Medication 3 MILLILITER(S): at 21:33

## 2017-06-02 NOTE — H&P ADULT - PROBLEM SELECTOR PLAN 2
- unprovoked PE 2 years ago  - continue eliquis 5mg BID - unprovoked (as per patient) PE 2 years ago  - continue eliquis 5mg BID  - patient has patchy opacities on chest imaging, history of carcinoid stage III (may be underlying etiology of PE)  - patient needs PET scan   - Dr. Euceda is outpatient oncologist, will follow in house

## 2017-06-02 NOTE — ED PROVIDER NOTE - OBJECTIVE STATEMENT
41 y/o female PMHx of Asthma, CAD, Cardiomyopathy, DM, HBV, HLD, HTN, PE, and Stented Coronary Artery and PSHx of AICD, Cholecystectomy, Appendectomy, and Cardiac Cath presents to the ED c/o bilateral flank pain. Pt was seen here on 5/23 with L sided flank pain and DC'd with Abx for Pyelo. However symptoms have worsened and now flank pain is bilateral prompting pt to visit the ED today. Pt notes subjective fever, nausea, worsening dysuria, and increased urinary frequency. Pt denies CP, SOB, vomiting, diarrhea, hematuria, painful urination, burning urination, or any other complaints. Allergies: Cipro (anaphylaxis)

## 2017-06-02 NOTE — ED PROVIDER NOTE - MEDICAL DECISION MAKING DETAILS
43 y/o female with worsening Pyelo. Pt not responsive to outpatient treatment. Will get labs, give fluids and analgesia and repeat CT to assess worsening Pyelo, then reassess.

## 2017-06-02 NOTE — ED PROVIDER NOTE - PROGRESS NOTE DETAILS
Pt being treated for pyleo however reproting worsening in symptoms.  Case discussed with Dr. Melchor.  pt to be admitted for failed outpatient treatment.  pt given ceftriaxone in ED.

## 2017-06-02 NOTE — CONSULT NOTE ADULT - SUBJECTIVE AND OBJECTIVE BOX
HPI:  Patient is a 42 year-old female from home with PMH of PE (on eliquis), HTN, DM type II, gastric ulcer, CAD (3 stents), post-partum cardiomyopathy s/p AICD (Medtronic), carcinoid tumors of appendix and colon (removed surgically ), asthma (very mild, has not had an attack in 3 years) who presents with left and right-sided back pain, lower abdominal pain, urinary frequency. She was seen in the ED on 17 and found to have pyelonephritis, discharged with 14 day course of cefpodoxime. Her symptoms initially resolved but 2-3 days ago she began to feel back on the left and right side (previously only had left-sided back pain) with urinary frequency. She also had white vaginal discharge. She denies fever, chills, headache, chest pain, shortness of breath, nausea, vomiting, diarrhea. She also mentions that she has had night time wheezing for a few weeks. She has had cough productive of yellow sputum for a few months. She was told that a recent CT scan showed worsening of opacities in her chest and that she needs a PET scan. She reports recent weight gain of 10 lbs. because she is not working and eats junk food at home. She also reports that she stopped taking Lantus 25 units at bedtime last year for no particular reason. (2017 15:15)      PAST MEDICAL & SURGICAL HISTORY:  Hepatitis B  Personal history of PE (pulmonary embolism)  PE (pulmonary embolism)  Cardiomyopathy  Asthma  HLD (hyperlipidemia)  Stented coronary artery  CAD (coronary artery disease)  DM type 2 (diabetes mellitus, type 2)  HTN (hypertension)  AICD (automatic cardioverter/defibrillator) present  Postpartum cardiomyopathy  Hypercholesterolemia  Hypertension  CAD (coronary artery disease): s/p 3 stents, last stent in may 2014  Diabetes  Cardiomyopathy  AICD (automatic cardioverter/defibrillator) (Medtronic)  HTN (hypertension)  Asthma  ICD: 2013  DM (diabetes mellitus)  S/P appendectomy  History of cholecystectomy  Gallbladder obstruction  Benign carcinoid tumor of appendix  AICD (automatic cardioverter/defibrillator) present:   Benign carcinoid tumor of appendix  S/P cholecystectomy  Coronary heart disease: s/p AICD PLACEMENT  Gallbladder obstruction  S/P ICD (internal cardiac defibrillator) procedure  S/P cardiac cath: s/p stents       Cipro (Anaphylaxis)      Meds:  acetaminophen   Tablet. 650milliGRAM(s) Oral every 6 hours PRN  aspirin enteric coated 81milliGRAM(s) Oral daily  lisinopril 10milliGRAM(s) Oral daily  apixaban 5milliGRAM(s) Oral two times a day  carvedilol 12.5milliGRAM(s) Oral every 12 hours  insulin lispro (HumaLOG) corrective regimen sliding scale  SubCutaneous three times a day before meals  dextrose 5%. 1000milliLiter(s) IV Continuous <Continuous>  dextrose Gel 1Dose(s) Oral once PRN  dextrose 50% Injectable 12.5Gram(s) IV Push once  dextrose 50% Injectable 25Gram(s) IV Push once  dextrose 50% Injectable 25Gram(s) IV Push once  glucagon  Injectable 1milliGRAM(s) IntraMuscular once PRN  ALBUTerol/ipratropium for Nebulization 3milliLiter(s) Nebulizer every 6 hours  ceFAZolin   IVPB 1000milliGRAM(s) IV Intermittent once  ceFAZolin   IVPB 1000milliGRAM(s) IV Intermittent every 8 hours  ceFAZolin   IVPB  IV Intermittent   fluconAZOLE   Tablet 150milliGRAM(s) Oral once      SOCIAL HISTORY:  Smoker:  YES / NO        PACK YEARS:                         WHEN QUIT?  ETOH use:  YES / NO               FREQUENCY / QUANTITY:  Ilicit Drug use:  YES / NO  Occupation:  Assisted device use (Cane / Walker):  Live with:    FAMILY HISTORY:  Family history of hypertension (Mother)  No pertinent family history in first degree relatives      VITALS:  Vital Signs Last 24 Hrs  T(C): 36.4, Max: 36.8 ( @ 10:14)  T(F): 97.5, Max: 98.3 ( @ 10:14)  HR: 101 (99 - 101)  BP: 145/94 (119/78 - 145/94)  BP(mean): --  RR: 20 (17 - 20)  SpO2: 100% (100% - 100%)    LABS/DIAGNOSTIC TESTS:                          11.3   5.0   )-----------( 202      ( 2017 10:58 )             34.1     WBC Count: 5.0 K/uL ( @ 10:58)          134<L>  |  102  |  13  ----------------------------<  327<H>  4.2   |  22  |  0.96    Ca    8.7      2017 10:58    TPro  7.2  /  Alb  3.2<L>  /  TBili  0.6  /  DBili  x   /  AST  12  /  ALT  23  /  AlkPhos  74  06-02      Urinalysis Basic - ( 2017 10:58 )    Color: Yellow / Appearance: Clear / S.020 / pH: x  Gluc: x / Ketone: Trace  / Bili: Negative / Urobili: Negative   Blood: x / Protein: 30 mg/dL / Nitrite: Negative   Leuk Esterase: Negative / RBC: x / WBC x   Sq Epi: x / Non Sq Epi: Few / Bacteria: x        LIVER FUNCTIONS - ( 2017 10:58 )  Alb: 3.2 g/dL / Pro: 7.2 g/dL / ALK PHOS: 74 U/L / ALT: 23 U/L DA / AST: 12 U/L / GGT: x                 LACTATE:    ABG -     CULTURES:   .Urine Clean Catch (Midstream)  - @ 17:24   >100,000 CFU/ml Staphylococcus aureus  --  Staphylococcus aureus          RADIOLOGY:      ROS  [  ] UNABLE TO ELICIT HPI:  Patient is a 42 year-old female from home with PMH of PE (on eliquis), HTN, DM type II, gastric ulcer, CAD (3 stents), post-partum cardiomyopathy s/p AICD (Medtronic), carcinoid tumors of appendix and colon (removed surgically ), asthma (very mild, has not had an attack in 3 years) who presents with left and right-sided back pain, lower abdominal pain, urinary frequency. She was seen in the ED on 17 and found to have pyelonephritis, discharged with 14 day course of cefpodoxime. Her symptoms initially resolved but 2-3 days ago she began to feel back on the left and right side (previously only had left-sided back pain) with urinary frequency. She also had white vaginal discharge. She denies fever, chills, headache, chest pain, shortness of breath, nausea, vomiting, diarrhea. She also mentions that she has had night time wheezing for a few weeks. She has had cough productive of yellow sputum for a few months. She was told that a recent CT scan showed worsening of opacities in her chest and that she needs a PET scan. She reports recent weight gain of 10 lbs. because she is not working and eats junk food at home. She also reports that she stopped taking Lantus 25 units at bedtime last year for no particular reason. (2017 15:15)      PAST MEDICAL & SURGICAL HISTORY:  Hepatitis B  Personal history of PE (pulmonary embolism)  PE (pulmonary embolism)  Cardiomyopathy  Asthma  HLD (hyperlipidemia)  Stented coronary artery  CAD (coronary artery disease)  DM type 2 (diabetes mellitus, type 2)  HTN (hypertension)  AICD (automatic cardioverter/defibrillator) present  Postpartum cardiomyopathy  Hypercholesterolemia  Hypertension  CAD (coronary artery disease): s/p 3 stents, last stent in may 2014  Diabetes  Cardiomyopathy  AICD (automatic cardioverter/defibrillator) (Medtronic)  HTN (hypertension)  Asthma  ICD: 2013  DM (diabetes mellitus)  S/P appendectomy  History of cholecystectomy  Gallbladder obstruction  Benign carcinoid tumor of appendix  AICD (automatic cardioverter/defibrillator) present:   Benign carcinoid tumor of appendix  S/P cholecystectomy  Coronary heart disease: s/p AICD PLACEMENT  Gallbladder obstruction  S/P ICD (internal cardiac defibrillator) procedure  S/P cardiac cath: s/p stents       Cipro (Anaphylaxis)      Meds:  acetaminophen   Tablet. 650milliGRAM(s) Oral every 6 hours PRN  aspirin enteric coated 81milliGRAM(s) Oral daily  lisinopril 10milliGRAM(s) Oral daily  apixaban 5milliGRAM(s) Oral two times a day  carvedilol 12.5milliGRAM(s) Oral every 12 hours  insulin lispro (HumaLOG) corrective regimen sliding scale  SubCutaneous three times a day before meals  dextrose 5%. 1000milliLiter(s) IV Continuous <Continuous>  dextrose Gel 1Dose(s) Oral once PRN  dextrose 50% Injectable 12.5Gram(s) IV Push once  dextrose 50% Injectable 25Gram(s) IV Push once  dextrose 50% Injectable 25Gram(s) IV Push once  glucagon  Injectable 1milliGRAM(s) IntraMuscular once PRN  ALBUTerol/ipratropium for Nebulization 3milliLiter(s) Nebulizer every 6 hours  ceFAZolin   IVPB 1000milliGRAM(s) IV Intermittent once  ceFAZolin   IVPB 1000milliGRAM(s) IV Intermittent every 8 hours  ceFAZolin   IVPB  IV Intermittent   fluconAZOLE   Tablet 150milliGRAM(s) Oral once      SOCIAL HISTORY:  Smoker:  NO      ETOH use:  YES, occ  Ilicit Drug use:  NO    FAMILY HISTORY:  Family history of hypertension (Mother)  No pertinent family history in first degree relatives      VITALS:  Vital Signs Last 24 Hrs  T(C): 36.4, Max: 36.8 ( @ 10:14)  T(F): 97.5, Max: 98.3 ( @ 10:14)  HR: 101 (99 - 101)  BP: 145/94 (119/78 - 145/94)  BP(mean): --  RR: 20 (17 - 20)  SpO2: 100% (100% - 100%)    LABS/DIAGNOSTIC TESTS:                          11.3   5.0   )-----------( 202      ( 2017 10:58 )             34.1     WBC Count: 5.0 K/uL ( @ 10:58)          134<L>  |  102  |  13  ----------------------------<  327<H>  4.2   |  22  |  0.96    Ca    8.7      2017 10:58    TPro  7.2  /  Alb  3.2<L>  /  TBili  0.6  /  DBili  x   /  AST  12  /  ALT  23  /  AlkPhos  74  06-02      Urinalysis Basic - ( 2017 10:58 )    Color: Yellow / Appearance: Clear / S.020 / pH: x  Gluc: x / Ketone: Trace  / Bili: Negative / Urobili: Negative   Blood: x / Protein: 30 mg/dL / Nitrite: Negative   Leuk Esterase: Negative / RBC: x / WBC x   Sq Epi: x / Non Sq Epi: Few / Bacteria: x        LIVER FUNCTIONS - ( 2017 10:58 )  Alb: 3.2 g/dL / Pro: 7.2 g/dL / ALK PHOS: 74 U/L / ALT: 23 U/L DA / AST: 12 U/L / GGT: x                 LACTATE:    ABG -     CULTURES:   .Urine Clean Catch (Midstream)  05-23 @ 17:24   >100,000 CFU/ml Staphylococcus aureus  --  Staphylococcus aureus          RADIOLOGY:EXAM:  CT ABDOMEN AND PELVIS                            PROCEDURE DATE:  2017        INTERPRETATION:  CLINICAL INFORMATION: Bilateral flank pain    COMPARISON: CT of the abdomen and pelvis dated 2017 and renal   ultrasound dated 12/3/2016.    PROCEDURE:   CT of the Abdomen and Pelvis was performed without intravenous contrast.   Intravenous contrast: None.  Oral contrast: None.  Sagittal and coronal reformats were performed.    And FINDINGS:    LOWER CHEST: Within normal limits. Pacer leads are partially visualized.    LIVER: Fatty infiltration of the liver.  BILE DUCTS: Normal caliber.  GALLBLADDER: Within normal limits.  SPLEEN: Within normal limits.  PANCREAS: Within normal limits.  ADRENALS: Within normal limits.  KIDNEYS/URETERS: Redemonstration of multiple small isodense lesions   within both kidneys, largest is exophytic off the upper pole the right   kidney measuring 1.8 cm, which were characterized as cysts on prior renal   ultrasound. No hydronephrosis.     BLADDER:Underdistended.  REPRODUCTIVE ORGANS: IUD in place. No adnexal mass.    BOWEL: Status post right hemicolectomy. No bowel obstruction.   PERITONEUM: No ascites.  VESSELS:  Within normal limits.  RETROPERITONEUM: No lymphadenopathy.    ABDOMINAL WALL: Small fat-containing ventral hernia.  BONES: Within normal limits.    IMPRESSION:     No hydronephrosis or renal stone.  Fatty infiltration of the liver.    Additional findings as above.        ROS  [  ] UNABLE TO ELICIT

## 2017-06-02 NOTE — ED ADULT NURSE NOTE - OBJECTIVE STATEMENT
as per the pt " I have the left lower abdominal pain and radiating to the rt lower abdominal pain marc taking antibiotics "

## 2017-06-02 NOTE — H&P ADULT - PROBLEM SELECTOR PLAN 3
- post-partum cardiomyopathy s/p Medtronic AICD  - patient has no chest pain, palpitations, no firing of AICD; she reports her last EF was 15%

## 2017-06-02 NOTE — H&P ADULT - ATTENDING COMMENTS
patient seen and examined. case fully discussed with  ER attending and medical resident. reviewed chief complaint. reviewed review of systems. reviewed list of medication,  reviewed physical exam. reviewed assessment and plan. agree with full H and P. continue antibiotics, will follow up with ID. continue Eliquis.

## 2017-06-02 NOTE — H&P ADULT - ASSESSMENT
Patient is a 42 year-old female from home with PMH of PE, HTN, DM type II, gastric ulcer, CAD, post-partum cardiomyopathy s/p AICD, carcinoid tumors of appendix and colon, asthma who presents with left and right-sided back pain, lower abdominal pain, urinary frequency and is admitted for pyelonephritis.

## 2017-06-02 NOTE — H&P ADULT - PROBLEM SELECTOR PLAN 1
- s/p 7 day course of cefpodoxime without resolution of symptoms, now has bilateral CVA tenderness; also has yeast infection  - UA negative, as she is partially treated  - CT abdomen shows no pyelonephritis; positive for fatty liver  - Urine culture growing MSSA   - s/p ceftriaxone times 1 in the ED; continue   - follow up blood cultures - s/p 7 day course of cefpodoxime without resolution of symptoms, now has bilateral CVA tenderness; also has yeast infection  - UA negative, as she is partially treated  - CT abdomen shows no pyelonephritis; positive for fatty liver  - Urine culture growing MSSA   - s/p ceftriaxone times 1 in the ED; continue ancef 1 gram every 8 hours   - follow up blood cultures - s/p 7 day course of cefpodoxime without resolution of symptoms, now has bilateral CVA tenderness; also has yeast infection  - UA negative, as she is partially treated  - CT abdomen shows no pyelonephritis; positive for fatty liver  - Urine culture growing MSSA   - s/p ceftriaxone times 1 in the ED; continue ancef 1 gram every 8 hours; 1 dose 150mg PO diflucan for vulvovaginal candidiasis   - follow up blood cultures

## 2017-06-02 NOTE — CONSULT NOTE ADULT - ASSESSMENT
possible partially treated uti/pyelonephritis  vaginitis  plan - kefzol 1gm iv q8 hrs   await culture results.  diflucan 150mgs x 1 dose

## 2017-06-02 NOTE — H&P ADULT - HISTORY OF PRESENT ILLNESS
Patient is a 42 year-old female from home with PMH of PE (on eliquis), HTN, DM type II, gastric ulcer, CAD (3 stents), post-partum cardiomyopathy s/p AICD (Medtronic) who presents with left and right-sided back pain, lower abdominal pain, urinary frequency. She was treated for UTI 2 weeks ago Patient is a 42 year-old female from home with PMH of PE (on eliquis), HTN, DM type II, gastric ulcer, CAD (3 stents), post-partum cardiomyopathy s/p AICD (Medtronic), carcinoid tumors of appendix and colon (removed surgically November, 2014), asthma (very mild, has not had an attack in 3 years) who presents with left and right-sided back pain, lower abdominal pain, urinary frequency. She was seen in the ED on 5/28/17 and found to have pyelonephritis, discharged with 14 day course of cefpodoxime. Her symptoms initially resolved but 2-3 days ago she began to feel back on the left and right side (previously only had left-sided back pain) with urinary frequency. She also had white vaginal discharge. She denies fever, chills, headache, chest pain, shortness of breath, nausea, vomiting, diarrhea. She also mentions that she has had night time wheezing for a few weeks. She has had cough productive of yellow sputum for a few months. She was told that a recent CT scan showed worsening of opacities in her chest and that she needs a PET scan. She reports recent weight gain of 10 lbs. because she is not working and eats junk food at home. Patient is a 42 year-old female from home with PMH of PE (on eliquis), HTN, DM type II, gastric ulcer, CAD (3 stents), post-partum cardiomyopathy s/p AICD (Medtronic), carcinoid tumors of appendix and colon (removed surgically November, 2014), asthma (very mild, has not had an attack in 3 years) who presents with left and right-sided back pain, lower abdominal pain, urinary frequency. She was seen in the ED on 5/28/17 and found to have pyelonephritis, discharged with 14 day course of cefpodoxime. Her symptoms initially resolved but 2-3 days ago she began to feel back on the left and right side (previously only had left-sided back pain) with urinary frequency. She also had white vaginal discharge. She denies fever, chills, headache, chest pain, shortness of breath, nausea, vomiting, diarrhea. She also mentions that she has had night time wheezing for a few weeks. She has had cough productive of yellow sputum for a few months. She was told that a recent CT scan showed worsening of opacities in her chest and that she needs a PET scan. She reports recent weight gain of 10 lbs. because she is not working and eats junk food at home. She also reports that she stopped taking Lantus 25 units at bedtime last year for no particular reason.

## 2017-06-03 LAB
ANION GAP SERPL CALC-SCNC: 4 MMOL/L — LOW (ref 5–17)
BASOPHILS # BLD AUTO: 0.1 K/UL — SIGNIFICANT CHANGE UP (ref 0–0.2)
BASOPHILS NFR BLD AUTO: 1 % — SIGNIFICANT CHANGE UP (ref 0–2)
BUN SERPL-MCNC: 15 MG/DL — SIGNIFICANT CHANGE UP (ref 7–18)
CALCIUM SERPL-MCNC: 8.9 MG/DL — SIGNIFICANT CHANGE UP (ref 8.4–10.5)
CHLORIDE SERPL-SCNC: 102 MMOL/L — SIGNIFICANT CHANGE UP (ref 96–108)
CHOLEST SERPL-MCNC: 196 MG/DL — SIGNIFICANT CHANGE UP (ref 10–199)
CO2 SERPL-SCNC: 29 MMOL/L — SIGNIFICANT CHANGE UP (ref 22–31)
CREAT SERPL-MCNC: 0.83 MG/DL — SIGNIFICANT CHANGE UP (ref 0.5–1.3)
CULTURE RESULTS: NO GROWTH — SIGNIFICANT CHANGE UP
EOSINOPHIL # BLD AUTO: 0.3 K/UL — SIGNIFICANT CHANGE UP (ref 0–0.5)
EOSINOPHIL NFR BLD AUTO: 5.4 % — SIGNIFICANT CHANGE UP (ref 0–6)
GLUCOSE SERPL-MCNC: 225 MG/DL — HIGH (ref 70–99)
HBA1C BLD-MCNC: 9.6 % — HIGH (ref 4–5.6)
HCT VFR BLD CALC: 34 % — LOW (ref 34.5–45)
HDLC SERPL-MCNC: 51 MG/DL — SIGNIFICANT CHANGE UP (ref 40–125)
HGB BLD-MCNC: 10.6 G/DL — LOW (ref 11.5–15.5)
LIPID PNL WITH DIRECT LDL SERPL: 59 MG/DL — SIGNIFICANT CHANGE UP
LYMPHOCYTES # BLD AUTO: 2.2 K/UL — SIGNIFICANT CHANGE UP (ref 1–3.3)
LYMPHOCYTES # BLD AUTO: 41.4 % — SIGNIFICANT CHANGE UP (ref 13–44)
MAGNESIUM SERPL-MCNC: 1.7 MG/DL — SIGNIFICANT CHANGE UP (ref 1.6–2.6)
MCHC RBC-ENTMCNC: 23.3 PG — LOW (ref 27–34)
MCHC RBC-ENTMCNC: 31.3 GM/DL — LOW (ref 32–36)
MCV RBC AUTO: 74.3 FL — LOW (ref 80–100)
MONOCYTES # BLD AUTO: 0.3 K/UL — SIGNIFICANT CHANGE UP (ref 0–0.9)
MONOCYTES NFR BLD AUTO: 6.4 % — SIGNIFICANT CHANGE UP (ref 2–14)
NEUTROPHILS # BLD AUTO: 2.5 K/UL — SIGNIFICANT CHANGE UP (ref 1.8–7.4)
NEUTROPHILS NFR BLD AUTO: 45.7 % — SIGNIFICANT CHANGE UP (ref 43–77)
PHOSPHATE SERPL-MCNC: 3.1 MG/DL — SIGNIFICANT CHANGE UP (ref 2.5–4.5)
PLATELET # BLD AUTO: 210 K/UL — SIGNIFICANT CHANGE UP (ref 150–400)
POTASSIUM SERPL-MCNC: 4.1 MMOL/L — SIGNIFICANT CHANGE UP (ref 3.5–5.3)
POTASSIUM SERPL-SCNC: 4.1 MMOL/L — SIGNIFICANT CHANGE UP (ref 3.5–5.3)
RBC # BLD: 4.57 M/UL — SIGNIFICANT CHANGE UP (ref 3.8–5.2)
RBC # FLD: 13.7 % — SIGNIFICANT CHANGE UP (ref 10.3–14.5)
SODIUM SERPL-SCNC: 135 MMOL/L — SIGNIFICANT CHANGE UP (ref 135–145)
SPECIMEN SOURCE: SIGNIFICANT CHANGE UP
TOTAL CHOLESTEROL/HDL RATIO MEASUREMENT: 3.8 RATIO — SIGNIFICANT CHANGE UP (ref 3.3–7.1)
TRIGL SERPL-MCNC: 429 MG/DL — HIGH (ref 10–149)
TSH SERPL-MCNC: 1.74 UU/ML — SIGNIFICANT CHANGE UP (ref 0.34–4.82)
WBC # BLD: 5.4 K/UL — SIGNIFICANT CHANGE UP (ref 3.8–10.5)
WBC # FLD AUTO: 5.4 K/UL — SIGNIFICANT CHANGE UP (ref 3.8–10.5)

## 2017-06-03 RX ORDER — ONDANSETRON 8 MG/1
4 TABLET, FILM COATED ORAL ONCE
Qty: 0 | Refills: 0 | Status: COMPLETED | OUTPATIENT
Start: 2017-06-03 | End: 2017-06-03

## 2017-06-03 RX ORDER — KETOROLAC TROMETHAMINE 30 MG/ML
30 SYRINGE (ML) INJECTION ONCE
Qty: 0 | Refills: 0 | Status: DISCONTINUED | OUTPATIENT
Start: 2017-06-03 | End: 2017-06-03

## 2017-06-03 RX ORDER — BENZOCAINE AND MENTHOL 5; 1 G/100ML; G/100ML
1 LIQUID ORAL
Qty: 0 | Refills: 0 | Status: DISCONTINUED | OUTPATIENT
Start: 2017-06-03 | End: 2017-06-05

## 2017-06-03 RX ADMIN — Medication 100 MILLIGRAM(S): at 05:37

## 2017-06-03 RX ADMIN — LISINOPRIL 10 MILLIGRAM(S): 2.5 TABLET ORAL at 05:36

## 2017-06-03 RX ADMIN — Medication 3 MILLILITER(S): at 09:10

## 2017-06-03 RX ADMIN — Medication 4: at 12:57

## 2017-06-03 RX ADMIN — CARVEDILOL PHOSPHATE 12.5 MILLIGRAM(S): 80 CAPSULE, EXTENDED RELEASE ORAL at 05:36

## 2017-06-03 RX ADMIN — Medication 30 MILLIGRAM(S): at 23:15

## 2017-06-03 RX ADMIN — Medication 81 MILLIGRAM(S): at 12:57

## 2017-06-03 RX ADMIN — APIXABAN 5 MILLIGRAM(S): 2.5 TABLET, FILM COATED ORAL at 19:11

## 2017-06-03 RX ADMIN — Medication 100 MILLIGRAM(S): at 21:19

## 2017-06-03 RX ADMIN — Medication 2: at 17:42

## 2017-06-03 RX ADMIN — Medication 30 MILLIGRAM(S): at 22:08

## 2017-06-03 RX ADMIN — CARVEDILOL PHOSPHATE 12.5 MILLIGRAM(S): 80 CAPSULE, EXTENDED RELEASE ORAL at 19:11

## 2017-06-03 RX ADMIN — Medication 3: at 08:37

## 2017-06-03 RX ADMIN — Medication 3 MILLILITER(S): at 14:13

## 2017-06-03 RX ADMIN — BENZOCAINE AND MENTHOL 1 LOZENGE: 5; 1 LIQUID ORAL at 06:14

## 2017-06-03 RX ADMIN — APIXABAN 5 MILLIGRAM(S): 2.5 TABLET, FILM COATED ORAL at 05:36

## 2017-06-03 RX ADMIN — ONDANSETRON 4 MILLIGRAM(S): 8 TABLET, FILM COATED ORAL at 21:19

## 2017-06-03 RX ADMIN — Medication 100 MILLIGRAM(S): at 14:19

## 2017-06-03 NOTE — PROGRESS NOTE ADULT - SUBJECTIVE AND OBJECTIVE BOX
42y Female old female, lying in bed, NAD.  Pt denies fever and chills.  +frequency, no dysuria, no hematuria.  Pt being treated for pyelonephritis.  Cultures + for staphy aureus.       Meds:  ceFAZolin   IVPB 1000milliGRAM(s) IV Intermittent every 8 hours  ceFAZolin   IVPB  IV Intermittent     Allergies    Cipro (Anaphylaxis)    Intolerances        VITALS:  Vital Signs Last 24 Hrs  T(C): 36.3, Max: 36.8 (06-02 @ 10:14)  T(F): 97.4, Max: 98.3 (06-02 @ 10:14)  HR: 86 (86 - 101)  BP: 124/77 (119/78 - 145/94)  BP(mean): --  RR: 16 (14 - 20)  SpO2: 100% (100% - 100%)    LABS/DIAGNOSTIC TESTS:                          10.6   5.4   )-----------( 210      ( 03 Jun 2017 05:46 )             34.0         06-03    135  |  102  |  15  ----------------------------<  225<H>  4.1   |  29  |  0.83    Ca    8.9      03 Jun 2017 05:46  Phos  3.1     06-03  Mg     1.7     06-03    TPro  7.2  /  Alb  3.2<L>  /  TBili  0.6  /  DBili  x   /  AST  12  /  ALT  23  /  AlkPhos  74  06-02      LIVER FUNCTIONS - ( 02 Jun 2017 10:58 )  Alb: 3.2 g/dL / Pro: 7.2 g/dL / ALK PHOS: 74 U/L / ALT: 23 U/L DA / AST: 12 U/L / GGT: x             CULTURES:   .Urine Clean Catch (Midstream)  05-23 @ 17:24   >100,000 CFU/ml Staphylococcus aureus  --  Staphylococcus aureus      Culture - Urine (05.23.17 @ 17:24)    -  Nitrofurantoin: S <=32    -  Oxacillin: S 0.5    -  RIF- Rifampin: S <=1    -  Vancomycin: S 2    -  Cefazolin: S <=4    -  Ampicillin/Sulbactam: S <=8/4    -  Ciprofloxacin: S <=1    -  Levofloxacin: S <=1    -  Penicillin: R >8    -  Trimethoprim/Sulfamethoxazole: S <=0.5/9.5    -  Gentamicin: S <=4    -  Tetra/Doxy: S <=4    Specimen Source: .Urine Clean Catch (Midstream)    Culture Results:   >100,000 CFU/ml Staphylococcus aureus    Organism Identification: Staphylococcus aureus    Organism: Staphylococcus aureus    Method Type: LEXII        RADIOLOGY: 42y Female old female, lying in bed, NAD.  Pt denies fever and chills.  +frequency, no dysuria, no hematuria.  Pt being treated for pyelonephritis.  Cultures + for staphy aureus on last admission ,awaiting cult results.     Meds:  ceFAZolin   IVPB 1000milliGRAM(s) IV Intermittent every 8 hours  ceFAZolin   IVPB  IV Intermittent     Allergies    Cipro (Anaphylaxis)    Intolerances        VITALS:  Vital Signs Last 24 Hrs  T(C): 36.3, Max: 36.8 (06-02 @ 10:14)  T(F): 97.4, Max: 98.3 (06-02 @ 10:14)  HR: 86 (86 - 101)  BP: 124/77 (119/78 - 145/94)  BP(mean): --  RR: 16 (14 - 20)  SpO2: 100% (100% - 100%)    LABS/DIAGNOSTIC TESTS:                          10.6   5.4   )-----------( 210      ( 03 Jun 2017 05:46 )             34.0         06-03    135  |  102  |  15  ----------------------------<  225<H>  4.1   |  29  |  0.83    Ca    8.9      03 Jun 2017 05:46  Phos  3.1     06-03  Mg     1.7     06-03    TPro  7.2  /  Alb  3.2<L>  /  TBili  0.6  /  DBili  x   /  AST  12  /  ALT  23  /  AlkPhos  74  06-02      LIVER FUNCTIONS - ( 02 Jun 2017 10:58 )  Alb: 3.2 g/dL / Pro: 7.2 g/dL / ALK PHOS: 74 U/L / ALT: 23 U/L DA / AST: 12 U/L / GGT: x             CULTURES:   .Urine Clean Catch (Midstream)  05-23 @ 17:24   >100,000 CFU/ml Staphylococcus aureus  --  Staphylococcus aureus      Culture - Urine (05.23.17 @ 17:24)    -  Nitrofurantoin: S <=32    -  Oxacillin: S 0.5    -  RIF- Rifampin: S <=1    -  Vancomycin: S 2    -  Cefazolin: S <=4    -  Ampicillin/Sulbactam: S <=8/4    -  Ciprofloxacin: S <=1    -  Levofloxacin: S <=1    -  Penicillin: R >8    -  Trimethoprim/Sulfamethoxazole: S <=0.5/9.5    -  Gentamicin: S <=4    -  Tetra/Doxy: S <=4    Specimen Source: .Urine Clean Catch (Midstream)    Culture Results:   >100,000 CFU/ml Staphylococcus aureus    Organism Identification: Staphylococcus aureus    Organism: Staphylococcus aureus    Method Type: LEXII        RADIOLOGY:

## 2017-06-03 NOTE — PROGRESS NOTE ADULT - SUBJECTIVE AND OBJECTIVE BOX
CHIEF COMPLAINT:Patient is a 42y old  Female who presents with a chief complaint of back pain, abdominal pain (02 Jun 2017 15:15)    	  REVIEW OF SYSTEMS:  CONSTITUTIONAL: No fever, weight loss, or fatigue  EYES: No eye pain, visual disturbances, or discharge  ENMT:  No difficulty hearing, tinnitus, vertigo; No sinus or throat pain  NECK: No pain or stiffness  RESPIRATORY: No cough, wheezing, chills or hemoptysis; No Shortness of Breath  CARDIOVASCULAR: No chest pain, palpitations, passing out, dizziness, or leg swelling  GASTROINTESTINAL: No abdominal or epigastric pain. No nausea, vomiting, or hematemesis; No diarrhea or constipation. No melena or hematochezia.  GENITOURINARY: No dysuria, frequency, hematuria, or incontinence  NEUROLOGICAL: No headaches, memory loss, loss of strength, numbness, or tremors  SKIN: No itching, burning, rashes, or lesions   LYMPH Nodes: No enlarged glands  ENDOCRINE: No heat or cold intolerance; No hair loss  MUSCULOSKELETAL: No joint pain or swelling; No muscle, back, or extremity pain  PSYCHIATRIC: No depression, anxiety, mood swings, or difficulty sleeping  HEME/LYMPH: No easy bruising, or bleeding gums  ALLERY AND IMMUNOLOGIC: No hives or eczema	    [ ] All others negative	  [ ] Unable to obtain    PHYSICAL EXAM:  T(C): 36.9, Max: 36.9 (06-03 @ 14:10)  HR: 89 (86 - 101)  BP: 113/74 (113/74 - 145/94)  RR: 16 (14 - 20)  SpO2: 99% (99% - 100%)  Wt(kg): --  I&O's Summary    I & Os for current day (as of 03 Jun 2017 14:13)  =============================================  IN: 50 ml / OUT: 0 ml / NET: 50 ml      Appearance: Normal	  HEENT:   Normal oral mucosa, PERRL, EOMI	  Lymphatic: No lymphadenopathy  Cardiovascular: Normal S1 S2, No JVD, No murmurs, No edema  Respiratory: Lungs clear to auscultation	  Psychiatry: A & O x 3, Mood & affect appropriate  Gastrointestinal:  Soft, Non-tender, + BS	  Skin: No rashes, No ecchymoses, No cyanosis	  Neurologic: Non-focal  Extremities: Normal range of motion, No clubbing, cyanosis or edema  Vascular: Peripheral pulses palpable 2+ bilaterally    MEDICATIONS  (STANDING):  aspirin enteric coated 81milliGRAM(s) Oral daily  lisinopril 10milliGRAM(s) Oral daily  apixaban 5milliGRAM(s) Oral two times a day  carvedilol 12.5milliGRAM(s) Oral every 12 hours  insulin lispro (HumaLOG) corrective regimen sliding scale  SubCutaneous three times a day before meals  dextrose 5%. 1000milliLiter(s) IV Continuous <Continuous>  dextrose 50% Injectable 12.5Gram(s) IV Push once  dextrose 50% Injectable 25Gram(s) IV Push once  dextrose 50% Injectable 25Gram(s) IV Push once  ALBUTerol/ipratropium for Nebulization 3milliLiter(s) Nebulizer every 6 hours  ceFAZolin   IVPB 1000milliGRAM(s) IV Intermittent every 8 hours  ceFAZolin   IVPB  IV Intermittent   ondansetron Injectable 4milliGRAM(s) IV Push once      TELEMETRY: 	    ECG:  	  RADIOLOGY:  OTHER: 	  	  LABS:	 	    CARDIAC MARKERS:                                10.6   5.4   )-----------( 210      ( 03 Jun 2017 05:46 )             34.0     06-03    135  |  102  |  15  ----------------------------<  225<H>  4.1   |  29  |  0.83    Ca    8.9      03 Jun 2017 05:46  Phos  3.1     06-03  Mg     1.7     06-03    TPro  7.2  /  Alb  3.2<L>  /  TBili  0.6  /  DBili  x   /  AST  12  /  ALT  23  /  AlkPhos  74  06-02    proBNP:   Lipid Profile: Cholesterol 196  LDL 59  HDL 51      HgA1c: Hemoglobin A1C, Whole Blood: 9.6 % (06-03 @ 11:02)    TSH: Thyroid Stimulating Hormone, Serum: 1.74 uU/mL (06-03 @ 05:46)

## 2017-06-03 NOTE — PROGRESS NOTE ADULT - SUBJECTIVE AND OBJECTIVE BOX
CHIEF COMPLAINT:Patient is a 42y old  Female who presents with a chief complaint of back pain, abdominal pain (02 Jun 2017 15:15)    	  REVIEW OF SYSTEMS:  CONSTITUTIONAL: No fever, weight loss, or fatigue  EYES: No eye pain, visual disturbances, or discharge  ENMT:  No difficulty hearing, tinnitus, vertigo; No sinus or throat pain  NECK: No pain or stiffness  RESPIRATORY: No cough, wheezing, chills or hemoptysis; No Shortness of Breath  CARDIOVASCULAR: No chest pain, palpitations, passing out, dizziness, or leg swelling  GASTROINTESTINAL: No abdominal or epigastric pain. No nausea, vomiting, or hematemesis; No diarrhea or constipation. No melena or hematochezia.  GENITOURINARY: No dysuria, frequency, hematuria, or incontinence  NEUROLOGICAL: No headaches, memory loss, loss of strength, numbness, or tremors  SKIN: No itching, burning, rashes, or lesions   LYMPH Nodes: No enlarged glands  ENDOCRINE: No heat or cold intolerance; No hair loss  MUSCULOSKELETAL: No joint pain or swelling; No muscle, back, or extremity pain  PSYCHIATRIC: No depression, anxiety, mood swings, or difficulty sleeping  HEME/LYMPH: No easy bruising, or bleeding gums  ALLERY AND IMMUNOLOGIC: No hives or eczema	    [ ] All others negative	  [ ] Unable to obtain    PHYSICAL EXAM:  T(C): 36.3, Max: 36.6 (06-02 @ 15:06)  HR: 86 (86 - 101)  BP: 124/77 (119/78 - 145/94)  RR: 16 (14 - 20)  SpO2: 100% (100% - 100%)  Wt(kg): --  I&O's Summary    I & Os for current day (as of 03 Jun 2017 14:10)  =============================================  IN: 50 ml / OUT: 0 ml / NET: 50 ml      Appearance: Normal	  HEENT:   Normal oral mucosa, PERRL, EOMI	  Lymphatic: No lymphadenopathy  Cardiovascular: Normal S1 S2, No JVD, No murmurs, No edema  Respiratory: Lungs clear to auscultation	  Psychiatry: A & O x 3, Mood & affect appropriate  Gastrointestinal:  Soft, Non-tender, + BS	  Skin: No rashes, No ecchymoses, No cyanosis	  Neurologic: Non-focal  Extremities: Normal range of motion, No clubbing, cyanosis or edema  Vascular: Peripheral pulses palpable 2+ bilaterally    MEDICATIONS  (STANDING):  aspirin enteric coated 81milliGRAM(s) Oral daily  lisinopril 10milliGRAM(s) Oral daily  apixaban 5milliGRAM(s) Oral two times a day  carvedilol 12.5milliGRAM(s) Oral every 12 hours  insulin lispro (HumaLOG) corrective regimen sliding scale  SubCutaneous three times a day before meals  dextrose 5%. 1000milliLiter(s) IV Continuous <Continuous>  dextrose 50% Injectable 12.5Gram(s) IV Push once  dextrose 50% Injectable 25Gram(s) IV Push once  dextrose 50% Injectable 25Gram(s) IV Push once  ALBUTerol/ipratropium for Nebulization 3milliLiter(s) Nebulizer every 6 hours  ceFAZolin   IVPB 1000milliGRAM(s) IV Intermittent every 8 hours  ceFAZolin   IVPB  IV Intermittent   ondansetron Injectable 4milliGRAM(s) IV Push once      TELEMETRY: 	    ECG:  	  RADIOLOGY:  OTHER: 	  	  LABS:	 	    CARDIAC MARKERS:                                10.6   5.4   )-----------( 210      ( 03 Jun 2017 05:46 )             34.0     06-03    135  |  102  |  15  ----------------------------<  225<H>  4.1   |  29  |  0.83    Ca    8.9      03 Jun 2017 05:46  Phos  3.1     06-03  Mg     1.7     06-03    TPro  7.2  /  Alb  3.2<L>  /  TBili  0.6  /  DBili  x   /  AST  12  /  ALT  23  /  AlkPhos  74  06-02    proBNP:   Lipid Profile: Cholesterol 196  LDL 59  HDL 51      HgA1c: Hemoglobin A1C, Whole Blood: 9.6 % (06-03 @ 11:02)    TSH: Thyroid Stimulating Hormone, Serum: 1.74 uU/mL (06-03 @ 05:46)

## 2017-06-03 NOTE — PROGRESS NOTE ADULT - ASSESSMENT
1.  UTI/ Pyelonephritis   2. Vaginitis    plan - kefzol 1gm iv q8 hrs   culture results available     - diflucan 150mgs x 1 dose given for vaginitis 1.  UTI/ Pyelonephritis   2. Vaginitis    plan - kefzol 1gm iv q8 hrs  - Day #2  culture results available     - diflucan 150mgs x 1 dose given for vaginitis 1.  UTI/ Pyelonephritis   2. Vaginitis    plan - kefzol 1gm iv q8 hrs  - Day #2  awaiting culture results    - diflucan 150mgs x 1 dose given for vaginitis

## 2017-06-03 NOTE — PROGRESS NOTE ADULT - ASSESSMENT
doing well. continue antibiotics for pyelonephritis. will follow up cultures in am. possible discharge in am/

## 2017-06-04 LAB
ANION GAP SERPL CALC-SCNC: 7 MMOL/L — SIGNIFICANT CHANGE UP (ref 5–17)
BASOPHILS # BLD AUTO: 0.1 K/UL — SIGNIFICANT CHANGE UP (ref 0–0.2)
BASOPHILS NFR BLD AUTO: 2 % — SIGNIFICANT CHANGE UP (ref 0–2)
BUN SERPL-MCNC: 21 MG/DL — HIGH (ref 7–18)
CALCIUM SERPL-MCNC: 9 MG/DL — SIGNIFICANT CHANGE UP (ref 8.4–10.5)
CHLORIDE SERPL-SCNC: 102 MMOL/L — SIGNIFICANT CHANGE UP (ref 96–108)
CO2 SERPL-SCNC: 28 MMOL/L — SIGNIFICANT CHANGE UP (ref 22–31)
CREAT SERPL-MCNC: 1.1 MG/DL — SIGNIFICANT CHANGE UP (ref 0.5–1.3)
EOSINOPHIL # BLD AUTO: 0.4 K/UL — SIGNIFICANT CHANGE UP (ref 0–0.5)
EOSINOPHIL NFR BLD AUTO: 5.9 % — SIGNIFICANT CHANGE UP (ref 0–6)
GLUCOSE SERPL-MCNC: 204 MG/DL — HIGH (ref 70–99)
HCT VFR BLD CALC: 38.6 % — SIGNIFICANT CHANGE UP (ref 34.5–45)
HGB BLD-MCNC: 12.2 G/DL — SIGNIFICANT CHANGE UP (ref 11.5–15.5)
LYMPHOCYTES # BLD AUTO: 2.1 K/UL — SIGNIFICANT CHANGE UP (ref 1–3.3)
LYMPHOCYTES # BLD AUTO: 32.1 % — SIGNIFICANT CHANGE UP (ref 13–44)
MAGNESIUM SERPL-MCNC: 1.8 MG/DL — SIGNIFICANT CHANGE UP (ref 1.6–2.6)
MCHC RBC-ENTMCNC: 23.6 PG — LOW (ref 27–34)
MCHC RBC-ENTMCNC: 31.6 GM/DL — LOW (ref 32–36)
MCV RBC AUTO: 74.4 FL — LOW (ref 80–100)
MONOCYTES # BLD AUTO: 0.3 K/UL — SIGNIFICANT CHANGE UP (ref 0–0.9)
MONOCYTES NFR BLD AUTO: 5.2 % — SIGNIFICANT CHANGE UP (ref 2–14)
NEUTROPHILS # BLD AUTO: 3.6 K/UL — SIGNIFICANT CHANGE UP (ref 1.8–7.4)
NEUTROPHILS NFR BLD AUTO: 54.8 % — SIGNIFICANT CHANGE UP (ref 43–77)
PHOSPHATE SERPL-MCNC: 4.1 MG/DL — SIGNIFICANT CHANGE UP (ref 2.5–4.5)
PLATELET # BLD AUTO: 262 K/UL — SIGNIFICANT CHANGE UP (ref 150–400)
POTASSIUM SERPL-MCNC: 4.3 MMOL/L — SIGNIFICANT CHANGE UP (ref 3.5–5.3)
POTASSIUM SERPL-SCNC: 4.3 MMOL/L — SIGNIFICANT CHANGE UP (ref 3.5–5.3)
RBC # BLD: 5.19 M/UL — SIGNIFICANT CHANGE UP (ref 3.8–5.2)
RBC # FLD: 13.8 % — SIGNIFICANT CHANGE UP (ref 10.3–14.5)
SODIUM SERPL-SCNC: 137 MMOL/L — SIGNIFICANT CHANGE UP (ref 135–145)
WBC # BLD: 6.6 K/UL — SIGNIFICANT CHANGE UP (ref 3.8–10.5)
WBC # FLD AUTO: 6.6 K/UL — SIGNIFICANT CHANGE UP (ref 3.8–10.5)

## 2017-06-04 RX ORDER — ALBUTEROL 90 UG/1
1 AEROSOL, METERED ORAL EVERY 6 HOURS
Qty: 0 | Refills: 0 | Status: DISCONTINUED | OUTPATIENT
Start: 2017-06-04 | End: 2017-06-05

## 2017-06-04 RX ORDER — FLUCONAZOLE 150 MG/1
150 TABLET ORAL ONCE
Qty: 0 | Refills: 0 | Status: COMPLETED | OUTPATIENT
Start: 2017-06-04 | End: 2017-06-04

## 2017-06-04 RX ADMIN — LISINOPRIL 10 MILLIGRAM(S): 2.5 TABLET ORAL at 05:14

## 2017-06-04 RX ADMIN — APIXABAN 5 MILLIGRAM(S): 2.5 TABLET, FILM COATED ORAL at 05:13

## 2017-06-04 RX ADMIN — Medication 2: at 08:30

## 2017-06-04 RX ADMIN — Medication 2: at 17:19

## 2017-06-04 RX ADMIN — APIXABAN 5 MILLIGRAM(S): 2.5 TABLET, FILM COATED ORAL at 17:20

## 2017-06-04 RX ADMIN — Medication 3 MILLILITER(S): at 14:25

## 2017-06-04 RX ADMIN — Medication 81 MILLIGRAM(S): at 11:20

## 2017-06-04 RX ADMIN — Medication 100 MILLIGRAM(S): at 14:24

## 2017-06-04 RX ADMIN — FLUCONAZOLE 150 MILLIGRAM(S): 150 TABLET ORAL at 11:19

## 2017-06-04 RX ADMIN — Medication 100 MILLIGRAM(S): at 05:14

## 2017-06-04 RX ADMIN — CARVEDILOL PHOSPHATE 12.5 MILLIGRAM(S): 80 CAPSULE, EXTENDED RELEASE ORAL at 05:14

## 2017-06-04 RX ADMIN — ALBUTEROL 1 PUFF(S): 90 AEROSOL, METERED ORAL at 22:15

## 2017-06-04 RX ADMIN — Medication 4: at 11:54

## 2017-06-04 RX ADMIN — Medication 100 MILLIGRAM(S): at 22:15

## 2017-06-04 RX ADMIN — CARVEDILOL PHOSPHATE 12.5 MILLIGRAM(S): 80 CAPSULE, EXTENDED RELEASE ORAL at 17:20

## 2017-06-04 RX ADMIN — Medication 3 MILLILITER(S): at 09:08

## 2017-06-04 NOTE — PROGRESS NOTE ADULT - SUBJECTIVE AND OBJECTIVE BOX
CHIEF COMPLAINT:Patient is a 42y old  Female who presents with a chief complaint of back pain, abdominal pain (02 Jun 2017 15:15)    	  REVIEW OF SYSTEMS:  CONSTITUTIONAL: No fever, weight loss, or fatigue  EYES: No eye pain, visual disturbances, or discharge  ENMT:  No difficulty hearing, tinnitus, vertigo; No sinus or throat pain  NECK: No pain or stiffness  RESPIRATORY: No cough, wheezing, chills or hemoptysis; No Shortness of Breath  CARDIOVASCULAR: No chest pain, palpitations, passing out, dizziness, or leg swelling  GASTROINTESTINAL: No abdominal or epigastric pain. No nausea, vomiting, or hematemesis; No diarrhea or constipation. No melena or hematochezia.  GENITOURINARY: No dysuria, frequency, hematuria, or incontinence  NEUROLOGICAL: No headaches, memory loss, loss of strength, numbness, or tremors  SKIN: No itching, burning, rashes, or lesions   LYMPH Nodes: No enlarged glands  ENDOCRINE: No heat or cold intolerance; No hair loss  MUSCULOSKELETAL: No joint pain or swelling; No muscle, back, or extremity pain  PSYCHIATRIC: No depression, anxiety, mood swings, or difficulty sleeping  HEME/LYMPH: No easy bruising, or bleeding gums  ALLERY AND IMMUNOLOGIC: No hives or eczema	    [ ] All others negative	  [ ] Unable to obtain    PHYSICAL EXAM:  T(C): 36.8, Max: 36.8 (06-03 @ 20:25)  HR: 89 (81 - 93)  BP: 119/93 (115/81 - 121/83)  RR: 17 (15 - 17)  SpO2: 98% (98% - 100%)  Wt(kg): --  I&O's Summary      Appearance: Normal	  HEENT:   Normal oral mucosa, PERRL, EOMI	  Lymphatic: No lymphadenopathy  Cardiovascular: Normal S1 S2, No JVD, No murmurs, No edema  Respiratory: Lungs clear to auscultation	  Psychiatry: A & O x 3, Mood & affect appropriate  Gastrointestinal:  Soft, Non-tender, + BS	  Skin: No rashes, No ecchymoses, No cyanosis	  Neurologic: Non-focal  Extremities: Normal range of motion, No clubbing, cyanosis or edema  Vascular: Peripheral pulses palpable 2+ bilaterally    MEDICATIONS  (STANDING):  aspirin enteric coated 81milliGRAM(s) Oral daily  lisinopril 10milliGRAM(s) Oral daily  apixaban 5milliGRAM(s) Oral two times a day  carvedilol 12.5milliGRAM(s) Oral every 12 hours  insulin lispro (HumaLOG) corrective regimen sliding scale  SubCutaneous three times a day before meals  dextrose 5%. 1000milliLiter(s) IV Continuous <Continuous>  dextrose 50% Injectable 12.5Gram(s) IV Push once  dextrose 50% Injectable 25Gram(s) IV Push once  dextrose 50% Injectable 25Gram(s) IV Push once  ceFAZolin   IVPB 1000milliGRAM(s) IV Intermittent every 8 hours  ceFAZolin   IVPB  IV Intermittent   ALBUTerol    90 MICROgram(s) HFA Inhaler 1Puff(s) Inhalation every 6 hours      TELEMETRY: 	    ECG:  	  RADIOLOGY:  OTHER: 	  	  LABS:	 	    CARDIAC MARKERS:                                12.2   6.6   )-----------( 262      ( 04 Jun 2017 05:30 )             38.6     06-04    137  |  102  |  21<H>  ----------------------------<  204<H>  4.3   |  28  |  1.10    Ca    9.0      04 Jun 2017 05:30  Phos  4.1     06-04  Mg     1.8     06-04      proBNP:   Lipid Profile: Cholesterol 196  LDL 59  HDL 51      HgA1c: Hemoglobin A1C, Whole Blood: 9.6 % (06-03 @ 11:02)    TSH: Thyroid Stimulating Hormone, Serum: 1.74 uU/mL (06-03 @ 05:46)

## 2017-06-04 NOTE — PROGRESS NOTE ADULT - ASSESSMENT
1.  UTI/ Pyelonephritis   2. Vaginitis    plan -  - kefzol 1gm iv q8 hrs  - Day #3  - cultures - NGTD   - diflucan 150mgs x 1 dose stat 1.  UTI/ Pyelonephritis   2. Vaginitis    plan -  - kefzol 1gm iv q8 hrs  - Day #3  - cultures - NGTD   - diflucan 150mgs x 1 dose stat  - Urine culture neg.  Pt is afrebile with no leukocytosis. Abdominal pain and nausea may not be d/t UTI - recommend no antibiotics on discharge.

## 2017-06-05 VITALS — HEART RATE: 96 BPM | SYSTOLIC BLOOD PRESSURE: 119 MMHG | DIASTOLIC BLOOD PRESSURE: 86 MMHG

## 2017-06-05 DIAGNOSIS — R06.02 SHORTNESS OF BREATH: ICD-10-CM

## 2017-06-05 DIAGNOSIS — N76.0 ACUTE VAGINITIS: ICD-10-CM

## 2017-06-05 DIAGNOSIS — N39.0 URINARY TRACT INFECTION, SITE NOT SPECIFIED: ICD-10-CM

## 2017-06-05 LAB
ANION GAP SERPL CALC-SCNC: 9 MMOL/L — SIGNIFICANT CHANGE UP (ref 5–17)
BASOPHILS # BLD AUTO: 0.1 K/UL — SIGNIFICANT CHANGE UP (ref 0–0.2)
BASOPHILS NFR BLD AUTO: 1.9 % — SIGNIFICANT CHANGE UP (ref 0–2)
BUN SERPL-MCNC: 18 MG/DL — SIGNIFICANT CHANGE UP (ref 7–18)
CALCIUM SERPL-MCNC: 9 MG/DL — SIGNIFICANT CHANGE UP (ref 8.4–10.5)
CHLORIDE SERPL-SCNC: 102 MMOL/L — SIGNIFICANT CHANGE UP (ref 96–108)
CO2 SERPL-SCNC: 25 MMOL/L — SIGNIFICANT CHANGE UP (ref 22–31)
CREAT SERPL-MCNC: 0.99 MG/DL — SIGNIFICANT CHANGE UP (ref 0.5–1.3)
EOSINOPHIL # BLD AUTO: 0.3 K/UL — SIGNIFICANT CHANGE UP (ref 0–0.5)
EOSINOPHIL NFR BLD AUTO: 5.2 % — SIGNIFICANT CHANGE UP (ref 0–6)
GLUCOSE SERPL-MCNC: 187 MG/DL — HIGH (ref 70–99)
HCT VFR BLD CALC: 36.4 % — SIGNIFICANT CHANGE UP (ref 34.5–45)
HGB BLD-MCNC: 12 G/DL — SIGNIFICANT CHANGE UP (ref 11.5–15.5)
LYMPHOCYTES # BLD AUTO: 2.5 K/UL — SIGNIFICANT CHANGE UP (ref 1–3.3)
LYMPHOCYTES # BLD AUTO: 43 % — SIGNIFICANT CHANGE UP (ref 13–44)
MAGNESIUM SERPL-MCNC: 1.8 MG/DL — SIGNIFICANT CHANGE UP (ref 1.6–2.6)
MCHC RBC-ENTMCNC: 23.9 PG — LOW (ref 27–34)
MCHC RBC-ENTMCNC: 33 GM/DL — SIGNIFICANT CHANGE UP (ref 32–36)
MCV RBC AUTO: 72.2 FL — LOW (ref 80–100)
MONOCYTES # BLD AUTO: 0.5 K/UL — SIGNIFICANT CHANGE UP (ref 0–0.9)
MONOCYTES NFR BLD AUTO: 7.7 % — SIGNIFICANT CHANGE UP (ref 2–14)
NEUTROPHILS # BLD AUTO: 2.5 K/UL — SIGNIFICANT CHANGE UP (ref 1.8–7.4)
NEUTROPHILS NFR BLD AUTO: 42.2 % — LOW (ref 43–77)
PHOSPHATE SERPL-MCNC: 3.6 MG/DL — SIGNIFICANT CHANGE UP (ref 2.5–4.5)
PLATELET # BLD AUTO: 239 K/UL — SIGNIFICANT CHANGE UP (ref 150–400)
POTASSIUM SERPL-MCNC: 4.6 MMOL/L — SIGNIFICANT CHANGE UP (ref 3.5–5.3)
POTASSIUM SERPL-SCNC: 4.6 MMOL/L — SIGNIFICANT CHANGE UP (ref 3.5–5.3)
RBC # BLD: 5.04 M/UL — SIGNIFICANT CHANGE UP (ref 3.8–5.2)
RBC # FLD: 13.1 % — SIGNIFICANT CHANGE UP (ref 10.3–14.5)
SODIUM SERPL-SCNC: 136 MMOL/L — SIGNIFICANT CHANGE UP (ref 135–145)
WBC # BLD: 5.8 K/UL — SIGNIFICANT CHANGE UP (ref 3.8–10.5)
WBC # FLD AUTO: 5.8 K/UL — SIGNIFICANT CHANGE UP (ref 3.8–10.5)

## 2017-06-05 PROCEDURE — 99285 EMERGENCY DEPT VISIT HI MDM: CPT | Mod: 25

## 2017-06-05 PROCEDURE — 85027 COMPLETE CBC AUTOMATED: CPT

## 2017-06-05 PROCEDURE — 80048 BASIC METABOLIC PNL TOTAL CA: CPT

## 2017-06-05 PROCEDURE — 87086 URINE CULTURE/COLONY COUNT: CPT

## 2017-06-05 PROCEDURE — 81025 URINE PREGNANCY TEST: CPT

## 2017-06-05 PROCEDURE — 94640 AIRWAY INHALATION TREATMENT: CPT

## 2017-06-05 PROCEDURE — 84443 ASSAY THYROID STIM HORMONE: CPT

## 2017-06-05 PROCEDURE — 80053 COMPREHEN METABOLIC PANEL: CPT

## 2017-06-05 PROCEDURE — 83735 ASSAY OF MAGNESIUM: CPT

## 2017-06-05 PROCEDURE — 74176 CT ABD & PELVIS W/O CONTRAST: CPT

## 2017-06-05 PROCEDURE — 81001 URINALYSIS AUTO W/SCOPE: CPT

## 2017-06-05 PROCEDURE — 84100 ASSAY OF PHOSPHORUS: CPT

## 2017-06-05 PROCEDURE — 83690 ASSAY OF LIPASE: CPT

## 2017-06-05 PROCEDURE — 87040 BLOOD CULTURE FOR BACTERIA: CPT

## 2017-06-05 PROCEDURE — 80061 LIPID PANEL: CPT

## 2017-06-05 PROCEDURE — 83036 HEMOGLOBIN GLYCOSYLATED A1C: CPT

## 2017-06-05 RX ORDER — DIPHENHYDRAMINE HCL 50 MG
50 CAPSULE ORAL ONCE
Qty: 0 | Refills: 0 | Status: COMPLETED | OUTPATIENT
Start: 2017-06-05 | End: 2017-06-05

## 2017-06-05 RX ORDER — ONDANSETRON 8 MG/1
4 TABLET, FILM COATED ORAL ONCE
Qty: 0 | Refills: 0 | Status: COMPLETED | OUTPATIENT
Start: 2017-06-05 | End: 2017-06-05

## 2017-06-05 RX ORDER — ATORVASTATIN CALCIUM 80 MG/1
1 TABLET, FILM COATED ORAL
Qty: 0 | Refills: 0 | COMMUNITY
Start: 2017-06-05

## 2017-06-05 RX ORDER — INSULIN LISPRO 100/ML
VIAL (ML) SUBCUTANEOUS
Qty: 0 | Refills: 0 | Status: DISCONTINUED | OUTPATIENT
Start: 2017-06-05 | End: 2017-06-05

## 2017-06-05 RX ORDER — ALBUTEROL 90 UG/1
1 AEROSOL, METERED ORAL
Qty: 1 | Refills: 0 | OUTPATIENT
Start: 2017-06-05 | End: 2017-07-05

## 2017-06-05 RX ORDER — ROSUVASTATIN CALCIUM 5 MG/1
1 TABLET ORAL
Qty: 30 | Refills: 0 | OUTPATIENT
Start: 2017-06-05 | End: 2017-07-05

## 2017-06-05 RX ORDER — LACTULOSE 10 G/15ML
20 SOLUTION ORAL ONCE
Qty: 0 | Refills: 0 | Status: COMPLETED | OUTPATIENT
Start: 2017-06-05 | End: 2017-06-05

## 2017-06-05 RX ORDER — ATORVASTATIN CALCIUM 80 MG/1
20 TABLET, FILM COATED ORAL AT BEDTIME
Qty: 0 | Refills: 0 | Status: DISCONTINUED | OUTPATIENT
Start: 2017-06-05 | End: 2017-06-05

## 2017-06-05 RX ADMIN — Medication 4: at 17:00

## 2017-06-05 RX ADMIN — APIXABAN 5 MILLIGRAM(S): 2.5 TABLET, FILM COATED ORAL at 05:42

## 2017-06-05 RX ADMIN — LACTULOSE 20 GRAM(S): 10 SOLUTION ORAL at 03:03

## 2017-06-05 RX ADMIN — CARVEDILOL PHOSPHATE 12.5 MILLIGRAM(S): 80 CAPSULE, EXTENDED RELEASE ORAL at 17:00

## 2017-06-05 RX ADMIN — ALBUTEROL 1 PUFF(S): 90 AEROSOL, METERED ORAL at 03:03

## 2017-06-05 RX ADMIN — ALBUTEROL 1 PUFF(S): 90 AEROSOL, METERED ORAL at 15:44

## 2017-06-05 RX ADMIN — Medication 50 MILLIGRAM(S): at 10:01

## 2017-06-05 RX ADMIN — ONDANSETRON 4 MILLIGRAM(S): 8 TABLET, FILM COATED ORAL at 08:53

## 2017-06-05 RX ADMIN — Medication 81 MILLIGRAM(S): at 12:02

## 2017-06-05 RX ADMIN — Medication 4: at 12:02

## 2017-06-05 RX ADMIN — ALBUTEROL 1 PUFF(S): 90 AEROSOL, METERED ORAL at 10:09

## 2017-06-05 RX ADMIN — Medication 100 MILLIGRAM(S): at 05:42

## 2017-06-05 RX ADMIN — Medication 100 MILLIGRAM(S): at 12:03

## 2017-06-05 RX ADMIN — APIXABAN 5 MILLIGRAM(S): 2.5 TABLET, FILM COATED ORAL at 17:00

## 2017-06-05 NOTE — DISCHARGE NOTE ADULT - CARE PLAN
Principal Discharge DX:	Urinary tract bacterial infections  Goal:	Resolved  Instructions for follow-up, activity and diet:	You completed antibiotic treatment in hospital  Secondary Diagnosis:	Vaginitis  Goal:	Resolved  Instructions for follow-up, activity and diet:	You completed antibiotic treatment in hospital  Secondary Diagnosis:	Personal history of PE (pulmonary embolism)  Goal:	Adherence to medication  Instructions for follow-up, activity and diet:	Take medication daily as instructed  Secondary Diagnosis:	Asthma  Goal:	Remain symptom free  Instructions for follow-up, activity and diet:	Follow up w/ PCP for Sleep Apnea referral  Secondary Diagnosis:	DM type 2 (diabetes mellitus, type 2)  Goal:	Maintain normal blood sugar  Instructions for follow-up, activity and diet:	Take medication daily as instructed

## 2017-06-05 NOTE — PROGRESS NOTE ADULT - ATTENDING COMMENTS
doing well. ID note appreciated today. will continue [facility] [place] day number three. patient is pain free today. last night had more pain in the lower back. will assess in am. possible discharge in am.
I agree with phoebe
I agree with above

## 2017-06-05 NOTE — PROGRESS NOTE ADULT - ASSESSMENT
1.  UTI/ Pyelonephritis   2. Vaginitis  - possible DC planning later today dending on pulm evaluation  - continue kefzol 1gm iv q8 hrs D4, upon DC stop all abx

## 2017-06-05 NOTE — DISCHARGE NOTE ADULT - ADDITIONAL INSTRUCTIONS
Pulmonologist Dr. Fernandes recommended an out patient sleep study.  Speak w/ your PCP regarding Sleep Apnea evaluation

## 2017-06-05 NOTE — PROGRESS NOTE ADULT - SUBJECTIVE AND OBJECTIVE BOX
Patient is a 42y old  Female who presents with back pain, and abdominal pain      INTERVAL HPI/OVERNIGHT EVENTS:    T(C): 36.7, Max: 36.8 (06-04 @ 17:12)  HR: 87 (75 - 94)  BP: 125/82 (100/70 - 146/96)  RR: 16 (14 - 18)  SpO2: 100% (94% - 100%)    LABS:                        12.0   5.8   )-----------( 239      ( 05 Jun 2017 07:35 )             36.4     06-05    136  |  102  |  18  ----------------------------<  187<H>  4.6   |  25  |  0.99    Ca    9.0      05 Jun 2017 07:35  Phos  3.6     06-05  Mg     1.8     06-05        CAPILLARY BLOOD GLUCOSE  320 (05 Jun 2017 11:28)  187 (05 Jun 2017 07:17)  274 (04 Jun 2017 20:56)  245 (04 Jun 2017 16:24)        MEDICATIONS  (STANDING):  aspirin enteric coated 81milliGRAM(s) Oral daily  lisinopril 10milliGRAM(s) Oral daily  apixaban 5milliGRAM(s) Oral two times a day  carvedilol 12.5milliGRAM(s) Oral every 12 hours  dextrose 5%. 1000milliLiter(s) IV Continuous <Continuous>  dextrose 50% Injectable 12.5Gram(s) IV Push once  dextrose 50% Injectable 25Gram(s) IV Push once  dextrose 50% Injectable 25Gram(s) IV Push once  ceFAZolin   IVPB 1000milliGRAM(s) IV Intermittent every 8 hours  ceFAZolin   IVPB  IV Intermittent   ALBUTerol    90 MICROgram(s) HFA Inhaler 1Puff(s) Inhalation every 6 hours  atorvastatin 20milliGRAM(s) Oral at bedtime  insulin lispro (HumaLOG) corrective regimen sliding scale  SubCutaneous three times a day before meals    MEDICATIONS  (PRN):  acetaminophen   Tablet. 650milliGRAM(s) Oral every 6 hours PRN Mild Pain (1 - 3)  dextrose Gel 1Dose(s) Oral once PRN Blood Glucose LESS THAN 70 milliGRAM(s)/deciLiter  glucagon  Injectable 1milliGRAM(s) IntraMuscular once PRN Glucose <70 milliGRAM(s)/deciLiter  oxyCODONE  5 mG/acetaminophen 325 mG 2Tablet(s) Oral every 6 hours PRN Moderate Pain (4 - 6)  benzocaine 15 mG/menthol 3.6 mG Lozenge 1Lozenge Oral five times a day PRN Sore Throat      REVIEW OF SYSTEMS:  CONSTITUTIONAL: No fever, weight loss.  Reports fatigue  EYES: No eye pain, visual disturbances, or discharge  ENT:  No difficulty hearing, tinnitus, vertigo; No sinus or throat pain  NECK: No pain or stiffness  RESPIRATORY: No cough, wheezing, chills or hemoptysis.  Reports shortness of breath and feeling of throat closing  CARDIOVASCULAR: No chest pain, palpitations, dizziness, or leg swelling  GASTROINTESTINAL: No abdominal or epigastric pain. Reports nausea.  Denies vomiting, or hematemesis; No diarrhea or constipation. No melena or hematochezia.  GENITOURINARY: No dysuria, frequency, hematuria, or incontinence  NEUROLOGICAL: No headaches, memory loss, loss of strength, numbness, or tremors  SKIN: No itching, burning, rashes, or lesions   LYMPH NODES: No enlarged glands  ENDOCRINE: No heat or cold intolerance; No hair loss  MUSCULOSKELETAL: No joint pain or swelling; No muscle, back, or extremity pain  PSYCHIATRIC: No depression, anxiety, mood swings, or difficulty sleeping  HEME/LYMPH: No easy bruising, or bleeding gums  ALLERGY AND IMMUNOLOGIC: No hives or eczema    RADIOLOGY & ADDITIONAL TESTS:    Imaging Personally Reviewed:  [X ] YES  [ ] NO    Consultant(s) Notes Reviewed:  [X ] YES  [ ] NO    PHYSICAL EXAM:  GENERAL: NAD, well-groomed, well-developed  HEAD:  Atraumatic, Normocephalic  EYES: Conjunctiva and sclera clear  ENT: No tonsillar erythema, exudates, or enlargement; Moist mucous membranes, Good dentition, No lesions  NECK: Supple, No JVD  NERVOUS SYSTEM:  Alert & Oriented X3  CHEST/LUNG: Clear to percussion bilaterally; No rales, rhonchi, wheezing, or rubs  HEART: Regular rate and rhythm; No murmurs, rubs, or gallops  ABDOMEN: Obese abdomen, soft, Nontender, Nondistended; Bowel sounds x 4 quad  EXTREMITIES:  + Peripheral Pulses, No clubbing, cyanosis, or edema  LYMPH: No lymphadenopathy noted  SKIN: No rashes or lesions    Care Collaborated Discussed with Consultants/Other Providers [X ] YES  [ ] NO

## 2017-06-05 NOTE — PROGRESS NOTE ADULT - SUBJECTIVE AND OBJECTIVE BOX
42y Female is under our care for doubtful UTI with likely vaginitis.  Patient reports episode of nausea, dizziness and throat constriction after her shower this morning. States symptoms were sudden.  Was given benadryl  and is feeling better now.  Possible DC planning later on once seen by pulm consult.    REVIEW OF SYSTEMS:  [  ] Not able to illicit  General: +malaise no fevers	  Chest: resolved dyspnea no cough  GI: mild lingering nausea, no v or d	  : improved urine frequency  Skin: no rashes	    MEDS:  ceFAZolin   IVPB 1000milliGRAM(s) IV Intermittent every 8 hours    ALLERGIES: Cipro (Anaphylaxis)    VITALS:  Vital Signs Last 24 Hrs  T(C): 36.7, Max: 36.8 (06-04 @ 17:12)  T(F): 98.1, Max: 98.2 (06-04 @ 17:12)  HR: 87 (75 - 94)  BP: 125/82 (100/70 - 146/96)  BP(mean): --  RR: 16 (14 - 18)  SpO2: 100% (94% - 100%)      PHYSICAL EXAM:  HEENT: +NC @ 2L/min  Neck: supple no LN's, able to swallow with difficulty  Respiratory: lungs clear  Cardiovascular: S1 S2 reg no murmurs  Gastrointestinal: +BS in all quadrants, large abdominal pannus which is nontender; no masses  Extremities: no edema  Skin: no rashes no erythema  Ortho: n/a  Neuro: A, A & O x 3    LABS/DIAGNOSTIC TESTS:                        12.0   5.8   )-----------( 239      ( 05 Jun 2017 07:35 )             36.4     06-05    136  |  102  |  18  ----------------------------<  187<H>  4.6   |  25  |  0.99    Ca    9.0      05 Jun 2017 07:35  Phos  3.6     06-05  Mg     1.8     06-05      CULTURES:   .Blood Blood-Venous  06-02 @ 23:31   No growth to date.  --  --      .Blood Blood-Venous  06-02 @ 23:25   No growth to date.  --  --      .Urine Clean Catch (Midstream)  06-02 @ 17:49   No growth  --  --      .Urine Clean Catch (Midstream)  05-23 @ 17:24   >100,000 CFU/ml Staphylococcus aureus  --  Staphylococcus aureus      RADIOLOGY: no new studies

## 2017-06-05 NOTE — DISCHARGE NOTE ADULT - MEDICATION SUMMARY - MEDICATIONS TO TAKE
I will START or STAY ON the medications listed below when I get home from the hospital:    acetaminophen 325 mg oral tablet  -- 2 tab(s) by mouth every 6 hours, As needed, Mild Pain  -- Indication: For Pain    aspirin 81 mg oral delayed release tablet  -- 1 tab(s) by mouth once a day  -- Indication: For CAD (coronary artery disease)    lisinopril 10 mg oral tablet  -- 1 tab(s) by mouth once a day  -- Indication: For Hypertension    Eliquis 5 mg oral tablet  -- 1 tab(s) by mouth 2 times a day  -- Indication: For Personal history of PE (pulmonary embolism)    metFORMIN 500 mg oral tablet  -- 1 tab(s) by mouth 2 times a day   -- Indication: For DM (diabetes mellitus)    rosuvastatin 5 mg oral tablet  -- 1 tab(s) by mouth once a day (at bedtime)  -- Do not take dairy products, antacids, or iron preparations within one hour of this medication.  Do not take this drug if you are pregnant.  It is very important that you take or use this exactly as directed.  Do not skip doses or discontinue unless directed by your doctor.    -- Indication: For CAD (coronary artery disease)    carvedilol 12.5 mg oral tablet  -- 1 tab(s) by mouth every 12 hours  -- Indication: For Hypertension    albuterol 90 mcg/inh inhalation aerosol  -- 1 puff(s) inhaled every 6 hours  -- Indication: For Asthma

## 2017-06-05 NOTE — DISCHARGE NOTE ADULT - PLAN OF CARE
Resolved You completed antibiotic treatment in hospital Adherence to medication Take medication daily as instructed Remain symptom free Maintain normal blood sugar Follow up w/ PCP for Sleep Apnea referral

## 2017-06-05 NOTE — DISCHARGE NOTE ADULT - CARE PROVIDER_API CALL
Jean Melchor (DO), Medicine  [address] [place] [address] [address]  [place] [address]  Phone: [phone]  Fax: [phone]

## 2017-06-05 NOTE — CONSULT NOTE ADULT - SUBJECTIVE AND OBJECTIVE BOX
Time of Visit:1200  Patient seen and examined. at bedside, lying comfertable, speaking in complete sentences    MEDICATIONS  (STANDING):  aspirin enteric coated 81milliGRAM(s) Oral daily  lisinopril 10milliGRAM(s) Oral daily  apixaban 5milliGRAM(s) Oral two times a day  carvedilol 12.5milliGRAM(s) Oral every 12 hours  dextrose 5%. 1000milliLiter(s) IV Continuous <Continuous>  dextrose 50% Injectable 12.5Gram(s) IV Push once  dextrose 50% Injectable 25Gram(s) IV Push once  dextrose 50% Injectable 25Gram(s) IV Push once  ceFAZolin   IVPB 1000milliGRAM(s) IV Intermittent every 8 hours  ceFAZolin   IVPB  IV Intermittent   ALBUTerol    90 MICROgram(s) HFA Inhaler 1Puff(s) Inhalation every 6 hours  atorvastatin 20milliGRAM(s) Oral at bedtime  insulin lispro (HumaLOG) corrective regimen sliding scale  SubCutaneous three times a day before meals      MEDICATIONS  (PRN):  acetaminophen   Tablet. 650milliGRAM(s) Oral every 6 hours PRN Mild Pain (1 - 3)  dextrose Gel 1Dose(s) Oral once PRN Blood Glucose LESS THAN 70 milliGRAM(s)/deciLiter  glucagon  Injectable 1milliGRAM(s) IntraMuscular once PRN Glucose <70 milliGRAM(s)/deciLiter  oxyCODONE  5 mG/acetaminophen 325 mG 2Tablet(s) Oral every 6 hours PRN Moderate Pain (4 - 6)  benzocaine 15 mG/menthol 3.6 mG Lozenge 1Lozenge Oral five times a day PRN Sore Throat       Medications up to date at time of exam.      PHYSICAL EXAMINATION:  Patient has no new complaints.  GENERAL: The patient is a well-developed, well-nourished, in no apparent distress.     Vital Signs Last 24 Hrs  T(C): 36.7, Max: 36.8 (06-04 @ 17:12)  T(F): 98.1, Max: 98.2 (06-04 @ 17:12)  HR: 87 (75 - 94)  BP: 125/82 (100/70 - 146/96)  BP(mean): --  RR: 16 (14 - 18)  SpO2: 100% (94% - 100%)   (if applicable)    Chest Tube (if applicable)    HEENT: Head is normocephalic and atraumatic. Extraocular muscles are intact. Mucous membranes are moist.     NECK: Supple, no palpable adenopathy.    LUNGS: Clear to auscultation, no wheezing, rales, or rhonchi.    HEART: Regular rate and rhythm without murmur.    ABDOMEN: Soft, nontender, and nondistended.  No hepatosplenomegaly is noted.    EXTREMITIES: Without any cyanosis, clubbing, rash, lesions or edema. no tenderness    NEUROLOGIC: Awake, alert, oriented.     SKIN: Warm, dry, good turgor.      LABS:                        12.0   5.8   )-----------( 239      ( 05 Jun 2017 07:35 )             36.4     06-05    136  |  102  |  18  ----------------------------<  187<H>  4.6   |  25  |  0.99    Ca    9.0      05 Jun 2017 07:35  Phos  3.6     06-05  Mg     1.8     06-05                          MICROBIOLOGY: (if applicable)    RADIOLOGY & ADDITIONAL STUDIES:  EKG:   CXR:rewiewed  ECHO:    IMPRESSION: 42y Female PAST MEDICAL & SURGICAL HISTORY:  Hepatitis B  Personal history of PE (pulmonary embolism)  PE (pulmonary embolism)  Cardiomyopathy  Asthma  HLD (hyperlipidemia)  Stented coronary artery  CAD (coronary artery disease)  DM type 2 (diabetes mellitus, type 2)  HTN (hypertension)  AICD (automatic cardioverter/defibrillator) present  Postpartum cardiomyopathy  Hypercholesterolemia  Hypertension  CAD (coronary artery disease): s/p 3 stents, last stent in may 2014  Diabetes  Cardiomyopathy  AICD (automatic cardioverter/defibrillator) (Medtronic)  HTN (hypertension)  Asthma  ICD: April/2013  DM (diabetes mellitus)  S/P appendectomy  History of cholecystectomy  Gallbladder obstruction  Benign carcinoid tumor of appendix  AICD (automatic cardioverter/defibrillator) present: 2013  Benign carcinoid tumor of appendix  S/P cholecystectomy  Coronary heart disease: s/p AICD PLACEMENT  Gallbladder obstruction  S/P ICD (internal cardiac defibrillator) procedure  S/P cardiac cath: s/p stents 2011   pulmonary evaluation requested for episode shortness of breath that resolved.. Pat is on eliquis for PE. I doubt PE at this time.    RECOMMENDATIONS:  -no further PE work up  -continue meds  -out patient Pul f/u  -pat snores and will need out patient sleep a pnea work  -advise wt loss with diet./ exercise

## 2017-06-05 NOTE — DISCHARGE NOTE ADULT - MEDICATION SUMMARY - MEDICATIONS TO STOP TAKING
I will STOP taking the medications listed below when I get home from the hospital:    cefpodoxime 200 mg oral tablet  -- 1 tab(s) by mouth every 12 hours  -- Finish all this medication unless otherwise directed by prescriber.  Take with food or milk.

## 2017-06-05 NOTE — DISCHARGE NOTE ADULT - HOSPITAL COURSE
43yo female from home with PMH of PE (on eliquis), HTN, DM type II, gastric ulcer, CAD (3 stents), post-partum cardiomyopathy s/p AICD (Medtronic), carcinoid tumors of appendix and colon (removed surgically November, 2014), asthma (very mild, has not had an attack in 3 years).  Presented to the ED with left and right-sided back pain, lower abdominal pain, urinary frequency.     Admitted to medicine for Urinary Tract Infection     Urinary Tract Infection  Blood culture remained negative  CT abdomen and pelvis was negative for pyelonephritis.  However, showed fatty liver  Urine culture positive for S. Aureus  Infectious Disease-Dr. Kahn consulted  Treated w/ antibiotic    Vaginitis  Urinalysis showed positive yeast    Personal history of PE (Pulmonary Embolism)  Treated w/ Aspirin and Eliquis    Cardiomyopathy  Stable    CAD (Coronary Artery Disease)  Treated w/ Aspirin, Atorvastatin and Eliquis    DM (Diabetes Mellitus)  QrBY2H=8.6  Finger sticks monitored   Treated w/ Humalog    Asthma  Treated w/ Duoneb  Pulmonologist-Dr. Fernandes consulted    DVT & GI prophylaxis given

## 2017-06-07 DIAGNOSIS — N39.0 URINARY TRACT INFECTION, SITE NOT SPECIFIED: ICD-10-CM

## 2017-06-07 DIAGNOSIS — K76.0 FATTY (CHANGE OF) LIVER, NOT ELSEWHERE CLASSIFIED: ICD-10-CM

## 2017-06-07 DIAGNOSIS — Z88.1 ALLERGY STATUS TO OTHER ANTIBIOTIC AGENTS STATUS: ICD-10-CM

## 2017-06-07 DIAGNOSIS — I25.10 ATHEROSCLEROTIC HEART DISEASE OF NATIVE CORONARY ARTERY WITHOUT ANGINA PECTORIS: ICD-10-CM

## 2017-06-07 DIAGNOSIS — E78.5 HYPERLIPIDEMIA, UNSPECIFIED: ICD-10-CM

## 2017-06-07 DIAGNOSIS — Z86.711 PERSONAL HISTORY OF PULMONARY EMBOLISM: ICD-10-CM

## 2017-06-07 DIAGNOSIS — I10 ESSENTIAL (PRIMARY) HYPERTENSION: ICD-10-CM

## 2017-06-07 DIAGNOSIS — Z86.19 PERSONAL HISTORY OF OTHER INFECTIOUS AND PARASITIC DISEASES: ICD-10-CM

## 2017-06-07 DIAGNOSIS — Z79.84 LONG TERM (CURRENT) USE OF ORAL HYPOGLYCEMIC DRUGS: ICD-10-CM

## 2017-06-07 DIAGNOSIS — J45.909 UNSPECIFIED ASTHMA, UNCOMPLICATED: ICD-10-CM

## 2017-06-07 DIAGNOSIS — B95.61 METHICILLIN SUSCEPTIBLE STAPHYLOCOCCUS AUREUS INFECTION AS THE CAUSE OF DISEASES CLASSIFIED ELSEWHERE: ICD-10-CM

## 2017-06-07 DIAGNOSIS — Z79.82 LONG TERM (CURRENT) USE OF ASPIRIN: ICD-10-CM

## 2017-06-07 DIAGNOSIS — E11.9 TYPE 2 DIABETES MELLITUS WITHOUT COMPLICATIONS: ICD-10-CM

## 2017-06-07 DIAGNOSIS — Z95.5 PRESENCE OF CORONARY ANGIOPLASTY IMPLANT AND GRAFT: ICD-10-CM

## 2017-06-07 DIAGNOSIS — Z95.810 PRESENCE OF AUTOMATIC (IMPLANTABLE) CARDIAC DEFIBRILLATOR: ICD-10-CM

## 2017-06-07 DIAGNOSIS — B37.3 CANDIDIASIS OF VULVA AND VAGINA: ICD-10-CM

## 2017-06-08 ENCOUNTER — EMERGENCY (EMERGENCY)
Facility: HOSPITAL | Age: 42
LOS: 1 days | Discharge: HOME | End: 2017-06-08
Attending: EMERGENCY MEDICINE
Payer: COMMERCIAL

## 2017-06-08 VITALS
RESPIRATION RATE: 16 BRPM | DIASTOLIC BLOOD PRESSURE: 83 MMHG | HEART RATE: 107 BPM | WEIGHT: 240.08 LBS | HEIGHT: 66 IN | TEMPERATURE: 98 F | SYSTOLIC BLOOD PRESSURE: 134 MMHG | OXYGEN SATURATION: 100 %

## 2017-06-08 VITALS
TEMPERATURE: 97 F | OXYGEN SATURATION: 99 % | RESPIRATION RATE: 16 BRPM | DIASTOLIC BLOOD PRESSURE: 81 MMHG | SYSTOLIC BLOOD PRESSURE: 115 MMHG | HEART RATE: 98 BPM

## 2017-06-08 DIAGNOSIS — E78.5 HYPERLIPIDEMIA, UNSPECIFIED: ICD-10-CM

## 2017-06-08 DIAGNOSIS — R42 DIZZINESS AND GIDDINESS: ICD-10-CM

## 2017-06-08 DIAGNOSIS — Z86.711 PERSONAL HISTORY OF PULMONARY EMBOLISM: ICD-10-CM

## 2017-06-08 DIAGNOSIS — Z90.49 ACQUIRED ABSENCE OF OTHER SPECIFIED PARTS OF DIGESTIVE TRACT: Chronic | ICD-10-CM

## 2017-06-08 DIAGNOSIS — E11.65 TYPE 2 DIABETES MELLITUS WITH HYPERGLYCEMIA: ICD-10-CM

## 2017-06-08 DIAGNOSIS — I42.9 CARDIOMYOPATHY, UNSPECIFIED: ICD-10-CM

## 2017-06-08 DIAGNOSIS — E78.00 PURE HYPERCHOLESTEROLEMIA, UNSPECIFIED: ICD-10-CM

## 2017-06-08 DIAGNOSIS — Z95.810 PRESENCE OF AUTOMATIC (IMPLANTABLE) CARDIAC DEFIBRILLATOR: ICD-10-CM

## 2017-06-08 DIAGNOSIS — Z79.82 LONG TERM (CURRENT) USE OF ASPIRIN: ICD-10-CM

## 2017-06-08 DIAGNOSIS — B19.10 UNSPECIFIED VIRAL HEPATITIS B WITHOUT HEPATIC COMA: ICD-10-CM

## 2017-06-08 DIAGNOSIS — Z88.3 ALLERGY STATUS TO OTHER ANTI-INFECTIVE AGENTS: ICD-10-CM

## 2017-06-08 DIAGNOSIS — Z79.84 LONG TERM (CURRENT) USE OF ORAL HYPOGLYCEMIC DRUGS: ICD-10-CM

## 2017-06-08 DIAGNOSIS — R53.81 OTHER MALAISE: ICD-10-CM

## 2017-06-08 DIAGNOSIS — Z79.4 LONG TERM (CURRENT) USE OF INSULIN: ICD-10-CM

## 2017-06-08 DIAGNOSIS — J45.909 UNSPECIFIED ASTHMA, UNCOMPLICATED: ICD-10-CM

## 2017-06-08 DIAGNOSIS — I25.10 ATHEROSCLEROTIC HEART DISEASE OF NATIVE CORONARY ARTERY WITHOUT ANGINA PECTORIS: ICD-10-CM

## 2017-06-08 LAB
ALBUMIN SERPL ELPH-MCNC: 3.6 G/DL — SIGNIFICANT CHANGE UP (ref 3.5–5)
ALP SERPL-CCNC: 72 U/L — SIGNIFICANT CHANGE UP (ref 40–120)
ALT FLD-CCNC: 21 U/L DA — SIGNIFICANT CHANGE UP (ref 10–60)
ANION GAP SERPL CALC-SCNC: 11 MMOL/L — SIGNIFICANT CHANGE UP (ref 5–17)
AST SERPL-CCNC: 17 U/L — SIGNIFICANT CHANGE UP (ref 10–40)
BASOPHILS # BLD AUTO: 0.1 K/UL — SIGNIFICANT CHANGE UP (ref 0–0.2)
BASOPHILS NFR BLD AUTO: 1.3 % — SIGNIFICANT CHANGE UP (ref 0–2)
BILIRUB SERPL-MCNC: 1 MG/DL — SIGNIFICANT CHANGE UP (ref 0.2–1.2)
BUN SERPL-MCNC: 16 MG/DL — SIGNIFICANT CHANGE UP (ref 7–18)
CALCIUM SERPL-MCNC: 9.1 MG/DL — SIGNIFICANT CHANGE UP (ref 8.4–10.5)
CHLORIDE SERPL-SCNC: 100 MMOL/L — SIGNIFICANT CHANGE UP (ref 96–108)
CO2 SERPL-SCNC: 23 MMOL/L — SIGNIFICANT CHANGE UP (ref 22–31)
CREAT SERPL-MCNC: 1.22 MG/DL — SIGNIFICANT CHANGE UP (ref 0.5–1.3)
CULTURE RESULTS: SIGNIFICANT CHANGE UP
CULTURE RESULTS: SIGNIFICANT CHANGE UP
EOSINOPHIL # BLD AUTO: 0.2 K/UL — SIGNIFICANT CHANGE UP (ref 0–0.5)
EOSINOPHIL NFR BLD AUTO: 2.9 % — SIGNIFICANT CHANGE UP (ref 0–6)
GLUCOSE SERPL-MCNC: 343 MG/DL — HIGH (ref 70–99)
HCG UR QL: NEGATIVE — SIGNIFICANT CHANGE UP
HCT VFR BLD CALC: 38.4 % — SIGNIFICANT CHANGE UP (ref 34.5–45)
HGB BLD-MCNC: 12.4 G/DL — SIGNIFICANT CHANGE UP (ref 11.5–15.5)
LYMPHOCYTES # BLD AUTO: 2.2 K/UL — SIGNIFICANT CHANGE UP (ref 1–3.3)
LYMPHOCYTES # BLD AUTO: 30.6 % — SIGNIFICANT CHANGE UP (ref 13–44)
MAGNESIUM SERPL-MCNC: 1.7 MG/DL — SIGNIFICANT CHANGE UP (ref 1.6–2.6)
MCHC RBC-ENTMCNC: 24.2 PG — LOW (ref 27–34)
MCHC RBC-ENTMCNC: 32.3 GM/DL — SIGNIFICANT CHANGE UP (ref 32–36)
MCV RBC AUTO: 74.8 FL — LOW (ref 80–100)
MONOCYTES # BLD AUTO: 0.4 K/UL — SIGNIFICANT CHANGE UP (ref 0–0.9)
MONOCYTES NFR BLD AUTO: 5.2 % — SIGNIFICANT CHANGE UP (ref 2–14)
NEUTROPHILS # BLD AUTO: 4.2 K/UL — SIGNIFICANT CHANGE UP (ref 1.8–7.4)
NEUTROPHILS NFR BLD AUTO: 59.9 % — SIGNIFICANT CHANGE UP (ref 43–77)
PLATELET # BLD AUTO: 244 K/UL — SIGNIFICANT CHANGE UP (ref 150–400)
POTASSIUM SERPL-MCNC: 4.4 MMOL/L — SIGNIFICANT CHANGE UP (ref 3.5–5.3)
POTASSIUM SERPL-SCNC: 4.4 MMOL/L — SIGNIFICANT CHANGE UP (ref 3.5–5.3)
PROT SERPL-MCNC: 8.1 G/DL — SIGNIFICANT CHANGE UP (ref 6–8.3)
RBC # BLD: 5.14 M/UL — SIGNIFICANT CHANGE UP (ref 3.8–5.2)
RBC # FLD: 14.5 % — SIGNIFICANT CHANGE UP (ref 10.3–14.5)
SODIUM SERPL-SCNC: 134 MMOL/L — LOW (ref 135–145)
SPECIMEN SOURCE: SIGNIFICANT CHANGE UP
SPECIMEN SOURCE: SIGNIFICANT CHANGE UP
WBC # BLD: 7 K/UL — SIGNIFICANT CHANGE UP (ref 3.8–10.5)
WBC # FLD AUTO: 7 K/UL — SIGNIFICANT CHANGE UP (ref 3.8–10.5)

## 2017-06-08 PROCEDURE — 96374 THER/PROPH/DIAG INJ IV PUSH: CPT

## 2017-06-08 PROCEDURE — 99284 EMERGENCY DEPT VISIT MOD MDM: CPT | Mod: 25

## 2017-06-08 PROCEDURE — 85027 COMPLETE CBC AUTOMATED: CPT

## 2017-06-08 PROCEDURE — 83735 ASSAY OF MAGNESIUM: CPT

## 2017-06-08 PROCEDURE — 99285 EMERGENCY DEPT VISIT HI MDM: CPT

## 2017-06-08 PROCEDURE — 80053 COMPREHEN METABOLIC PANEL: CPT

## 2017-06-08 PROCEDURE — 81025 URINE PREGNANCY TEST: CPT

## 2017-06-08 PROCEDURE — 93005 ELECTROCARDIOGRAM TRACING: CPT

## 2017-06-08 RX ORDER — METOCLOPRAMIDE HCL 10 MG
10 TABLET ORAL ONCE
Qty: 0 | Refills: 0 | Status: COMPLETED | OUTPATIENT
Start: 2017-06-08 | End: 2017-06-08

## 2017-06-08 RX ORDER — SODIUM CHLORIDE 9 MG/ML
1000 INJECTION INTRAMUSCULAR; INTRAVENOUS; SUBCUTANEOUS ONCE
Qty: 0 | Refills: 0 | Status: COMPLETED | OUTPATIENT
Start: 2017-06-08 | End: 2017-06-08

## 2017-06-08 RX ORDER — MECLIZINE HCL 12.5 MG
1 TABLET ORAL
Qty: 15 | Refills: 0 | OUTPATIENT
Start: 2017-06-08 | End: 2017-06-13

## 2017-06-08 RX ORDER — MECLIZINE HCL 12.5 MG
25 TABLET ORAL ONCE
Qty: 0 | Refills: 0 | Status: COMPLETED | OUTPATIENT
Start: 2017-06-08 | End: 2017-06-08

## 2017-06-08 RX ADMIN — Medication 25 MILLIGRAM(S): at 17:47

## 2017-06-08 RX ADMIN — Medication 10 MILLIGRAM(S): at 17:43

## 2017-06-08 RX ADMIN — SODIUM CHLORIDE 2000 MILLILITER(S): 9 INJECTION INTRAMUSCULAR; INTRAVENOUS; SUBCUTANEOUS at 17:41

## 2017-06-08 NOTE — ED PROVIDER NOTE - CARE PLAN
Principal Discharge DX:	Lightheadedness  Secondary Diagnosis:	Malaise and fatigue  Secondary Diagnosis:	Hyperglycemia

## 2017-06-08 NOTE — ED ADULT NURSE NOTE - OBJECTIVE STATEMENT
Pt a+ox3 w/ c/o nausea, dizziness, diarrhea and elevated BP x 1 day, pt denies fever, vomiting, SOB, chest pain, 0/10 pain scale.

## 2017-06-08 NOTE — ED PROVIDER NOTE - MEDICAL DECISION MAKING DETAILS
Patient with dizziness, weakness, general malaise x 2 days, normal exam with steady gait, ekg shows mild tachy but no SOB on eliquis already. Labs reveal hyperglycemia which is likely cause of symptoms, patient to f/u with endocrine outpatient and restart insulin. Feels better with fluids and meclizine, DC with meclizine and care coordinator to help with f/u appt.

## 2017-06-08 NOTE — ED PROVIDER NOTE - OBJECTIVE STATEMENT
43 y/o F pt with PMHx of AICD, CAD with 3 stents, DM, Hep B, HTN, HLD, PE, presents to ED c/o elevated blood pressure with HA, nausea, diarrhea, lightheadedness, weakness and dizziness x 2 days. Pt was admitted 4 days ago for pyelo and dc'd home two days ago with abx. Pt feels symptomatic improvement in the ED. Pt denies fever, chills, vomiting, SOB, cough, diaphoresis, or any other complaints. NKDA.

## 2017-06-28 DIAGNOSIS — R00.0 TACHYCARDIA, UNSPECIFIED: ICD-10-CM

## 2017-06-28 DIAGNOSIS — I25.10 ATHEROSCLEROTIC HEART DISEASE OF NATIVE CORONARY ARTERY WITHOUT ANGINA PECTORIS: ICD-10-CM

## 2017-06-28 DIAGNOSIS — R07.9 CHEST PAIN, UNSPECIFIED: ICD-10-CM

## 2017-06-28 DIAGNOSIS — E11.65 TYPE 2 DIABETES MELLITUS WITH HYPERGLYCEMIA: ICD-10-CM

## 2017-06-28 DIAGNOSIS — E66.9 OBESITY, UNSPECIFIED: ICD-10-CM

## 2017-06-28 DIAGNOSIS — Z98.61 CORONARY ANGIOPLASTY STATUS: ICD-10-CM

## 2017-06-28 DIAGNOSIS — G47.33 OBSTRUCTIVE SLEEP APNEA (ADULT) (PEDIATRIC): ICD-10-CM

## 2017-06-28 DIAGNOSIS — Z79.01 LONG TERM (CURRENT) USE OF ANTICOAGULANTS: ICD-10-CM

## 2017-06-28 DIAGNOSIS — Z95.810 PRESENCE OF AUTOMATIC (IMPLANTABLE) CARDIAC DEFIBRILLATOR: ICD-10-CM

## 2017-06-28 DIAGNOSIS — I11.0 HYPERTENSIVE HEART DISEASE WITH HEART FAILURE: ICD-10-CM

## 2017-06-28 DIAGNOSIS — I50.22 CHRONIC SYSTOLIC (CONGESTIVE) HEART FAILURE: ICD-10-CM

## 2017-06-28 DIAGNOSIS — Z86.03 PERSONAL HISTORY OF NEOPLASM OF UNCERTAIN BEHAVIOR: ICD-10-CM

## 2017-06-28 DIAGNOSIS — N89.8 OTHER SPECIFIED NONINFLAMMATORY DISORDERS OF VAGINA: ICD-10-CM

## 2017-06-28 DIAGNOSIS — I50.1 LEFT VENTRICULAR FAILURE, UNSPECIFIED: ICD-10-CM

## 2017-06-28 DIAGNOSIS — Z88.1 ALLERGY STATUS TO OTHER ANTIBIOTIC AGENTS STATUS: ICD-10-CM

## 2017-06-28 DIAGNOSIS — Z86.711 PERSONAL HISTORY OF PULMONARY EMBOLISM: ICD-10-CM

## 2017-06-28 DIAGNOSIS — Z79.84 LONG TERM (CURRENT) USE OF ORAL HYPOGLYCEMIC DRUGS: ICD-10-CM

## 2017-06-28 DIAGNOSIS — Z90.49 ACQUIRED ABSENCE OF OTHER SPECIFIED PARTS OF DIGESTIVE TRACT: ICD-10-CM

## 2017-06-28 DIAGNOSIS — Z91.14 PATIENT'S OTHER NONCOMPLIANCE WITH MEDICATION REGIMEN: ICD-10-CM

## 2017-07-09 ENCOUNTER — EMERGENCY (EMERGENCY)
Facility: HOSPITAL | Age: 42
LOS: 1 days | Discharge: HOME | End: 2017-07-09
Payer: COMMERCIAL

## 2017-07-09 VITALS
WEIGHT: 240.08 LBS | TEMPERATURE: 98 F | RESPIRATION RATE: 18 BRPM | SYSTOLIC BLOOD PRESSURE: 133 MMHG | HEIGHT: 66 IN | HEART RATE: 101 BPM | DIASTOLIC BLOOD PRESSURE: 82 MMHG | OXYGEN SATURATION: 100 %

## 2017-07-09 VITALS
RESPIRATION RATE: 18 BRPM | OXYGEN SATURATION: 99 % | HEART RATE: 94 BPM | SYSTOLIC BLOOD PRESSURE: 122 MMHG | DIASTOLIC BLOOD PRESSURE: 78 MMHG | TEMPERATURE: 98 F

## 2017-07-09 DIAGNOSIS — E11.65 TYPE 2 DIABETES MELLITUS WITH HYPERGLYCEMIA: ICD-10-CM

## 2017-07-09 DIAGNOSIS — Z86.19 PERSONAL HISTORY OF OTHER INFECTIOUS AND PARASITIC DISEASES: ICD-10-CM

## 2017-07-09 DIAGNOSIS — I25.10 ATHEROSCLEROTIC HEART DISEASE OF NATIVE CORONARY ARTERY WITHOUT ANGINA PECTORIS: ICD-10-CM

## 2017-07-09 DIAGNOSIS — Z95.810 PRESENCE OF AUTOMATIC (IMPLANTABLE) CARDIAC DEFIBRILLATOR: ICD-10-CM

## 2017-07-09 DIAGNOSIS — E78.5 HYPERLIPIDEMIA, UNSPECIFIED: ICD-10-CM

## 2017-07-09 DIAGNOSIS — M54.9 DORSALGIA, UNSPECIFIED: ICD-10-CM

## 2017-07-09 DIAGNOSIS — R10.84 GENERALIZED ABDOMINAL PAIN: ICD-10-CM

## 2017-07-09 DIAGNOSIS — Z90.49 ACQUIRED ABSENCE OF OTHER SPECIFIED PARTS OF DIGESTIVE TRACT: Chronic | ICD-10-CM

## 2017-07-09 DIAGNOSIS — Z88.1 ALLERGY STATUS TO OTHER ANTIBIOTIC AGENTS STATUS: ICD-10-CM

## 2017-07-09 DIAGNOSIS — I10 ESSENTIAL (PRIMARY) HYPERTENSION: ICD-10-CM

## 2017-07-09 LAB
ACETONE SERPL-MCNC: NEGATIVE — SIGNIFICANT CHANGE UP
ALBUMIN SERPL ELPH-MCNC: 3.5 G/DL — SIGNIFICANT CHANGE UP (ref 3.5–5)
ALP SERPL-CCNC: 77 U/L — SIGNIFICANT CHANGE UP (ref 40–120)
ALT FLD-CCNC: 25 U/L DA — SIGNIFICANT CHANGE UP (ref 10–60)
ANION GAP SERPL CALC-SCNC: 10 MMOL/L — SIGNIFICANT CHANGE UP (ref 5–17)
APPEARANCE UR: CLEAR — SIGNIFICANT CHANGE UP
AST SERPL-CCNC: 14 U/L — SIGNIFICANT CHANGE UP (ref 10–40)
BASE EXCESS BLDV CALC-SCNC: -4.5 MMOL/L — LOW (ref -2–2)
BASOPHILS # BLD AUTO: 0.1 K/UL — SIGNIFICANT CHANGE UP (ref 0–0.2)
BASOPHILS NFR BLD AUTO: 1.1 % — SIGNIFICANT CHANGE UP (ref 0–2)
BILIRUB SERPL-MCNC: 1.1 MG/DL — SIGNIFICANT CHANGE UP (ref 0.2–1.2)
BILIRUB UR-MCNC: NEGATIVE — SIGNIFICANT CHANGE UP
BUN SERPL-MCNC: 21 MG/DL — HIGH (ref 7–18)
CALCIUM SERPL-MCNC: 8.8 MG/DL — SIGNIFICANT CHANGE UP (ref 8.4–10.5)
CHLORIDE SERPL-SCNC: 101 MMOL/L — SIGNIFICANT CHANGE UP (ref 96–108)
CO2 SERPL-SCNC: 23 MMOL/L — SIGNIFICANT CHANGE UP (ref 22–31)
COLOR SPEC: YELLOW — SIGNIFICANT CHANGE UP
COMMENT - URINE: SIGNIFICANT CHANGE UP
CREAT SERPL-MCNC: 1.17 MG/DL — SIGNIFICANT CHANGE UP (ref 0.5–1.3)
DIFF PNL FLD: NEGATIVE — SIGNIFICANT CHANGE UP
EOSINOPHIL # BLD AUTO: 0.2 K/UL — SIGNIFICANT CHANGE UP (ref 0–0.5)
EOSINOPHIL NFR BLD AUTO: 3.2 % — SIGNIFICANT CHANGE UP (ref 0–6)
EPI CELLS # UR: ABNORMAL (ref 0–10)
GLUCOSE SERPL-MCNC: 392 MG/DL — HIGH (ref 70–99)
GLUCOSE UR QL: 1000 MG/DL
HCO3 BLDV-SCNC: 20 MMOL/L — LOW (ref 21–29)
HCT VFR BLD CALC: 39.2 % — SIGNIFICANT CHANGE UP (ref 34.5–45)
HGB BLD-MCNC: 12.8 G/DL — SIGNIFICANT CHANGE UP (ref 11.5–15.5)
HOROWITZ INDEX BLDV+IHG-RTO: 21 — SIGNIFICANT CHANGE UP
KETONES UR-MCNC: ABNORMAL
LACTATE SERPL-SCNC: 2.2 MMOL/L — HIGH (ref 0.7–2)
LEUKOCYTE ESTERASE UR-ACNC: NEGATIVE — SIGNIFICANT CHANGE UP
LYMPHOCYTES # BLD AUTO: 1.8 K/UL — SIGNIFICANT CHANGE UP (ref 1–3.3)
LYMPHOCYTES # BLD AUTO: 34.6 % — SIGNIFICANT CHANGE UP (ref 13–44)
MCHC RBC-ENTMCNC: 24.5 PG — LOW (ref 27–34)
MCHC RBC-ENTMCNC: 32.6 GM/DL — SIGNIFICANT CHANGE UP (ref 32–36)
MCV RBC AUTO: 75 FL — LOW (ref 80–100)
MONOCYTES # BLD AUTO: 0.3 K/UL — SIGNIFICANT CHANGE UP (ref 0–0.9)
MONOCYTES NFR BLD AUTO: 6.3 % — SIGNIFICANT CHANGE UP (ref 2–14)
NEUTROPHILS # BLD AUTO: 2.8 K/UL — SIGNIFICANT CHANGE UP (ref 1.8–7.4)
NEUTROPHILS NFR BLD AUTO: 54.8 % — SIGNIFICANT CHANGE UP (ref 43–77)
NITRITE UR-MCNC: NEGATIVE — SIGNIFICANT CHANGE UP
OSMOLALITY SERPL: 303 MOS/KG — HIGH (ref 275–300)
PCO2 BLDV: 38 MMHG — SIGNIFICANT CHANGE UP (ref 35–50)
PH BLDV: 7.34 — LOW (ref 7.35–7.45)
PH UR: 6 — SIGNIFICANT CHANGE UP (ref 5–8)
PLATELET # BLD AUTO: 233 K/UL — SIGNIFICANT CHANGE UP (ref 150–400)
PO2 BLDV: 48 MMHG — HIGH (ref 25–45)
POTASSIUM SERPL-MCNC: 4.6 MMOL/L — SIGNIFICANT CHANGE UP (ref 3.5–5.3)
POTASSIUM SERPL-SCNC: 4.6 MMOL/L — SIGNIFICANT CHANGE UP (ref 3.5–5.3)
PROT SERPL-MCNC: 7.9 G/DL — SIGNIFICANT CHANGE UP (ref 6–8.3)
PROT UR-MCNC: 30 MG/DL
RBC # BLD: 5.22 M/UL — HIGH (ref 3.8–5.2)
RBC # FLD: 13.2 % — SIGNIFICANT CHANGE UP (ref 10.3–14.5)
RBC CASTS # UR COMP ASSIST: SIGNIFICANT CHANGE UP /HPF (ref 0–2)
SAO2 % BLDV: 80 % — SIGNIFICANT CHANGE UP (ref 67–88)
SODIUM SERPL-SCNC: 134 MMOL/L — LOW (ref 135–145)
SP GR SPEC: 1.01 — SIGNIFICANT CHANGE UP (ref 1.01–1.02)
UROBILINOGEN FLD QL: NEGATIVE — SIGNIFICANT CHANGE UP
WBC # BLD: 5.1 K/UL — SIGNIFICANT CHANGE UP (ref 3.8–10.5)
WBC # FLD AUTO: 5.1 K/UL — SIGNIFICANT CHANGE UP (ref 3.8–10.5)
WBC UR QL: SIGNIFICANT CHANGE UP /HPF (ref 0–5)

## 2017-07-09 PROCEDURE — 74020: CPT

## 2017-07-09 PROCEDURE — 36415 COLL VENOUS BLD VENIPUNCTURE: CPT

## 2017-07-09 PROCEDURE — 83605 ASSAY OF LACTIC ACID: CPT

## 2017-07-09 PROCEDURE — 74020: CPT | Mod: 26

## 2017-07-09 PROCEDURE — 76770 US EXAM ABDO BACK WALL COMP: CPT | Mod: 26

## 2017-07-09 PROCEDURE — 82803 BLOOD GASES ANY COMBINATION: CPT

## 2017-07-09 PROCEDURE — 99284 EMERGENCY DEPT VISIT MOD MDM: CPT | Mod: 25

## 2017-07-09 PROCEDURE — 85027 COMPLETE CBC AUTOMATED: CPT

## 2017-07-09 PROCEDURE — 96372 THER/PROPH/DIAG INJ SC/IM: CPT

## 2017-07-09 PROCEDURE — 96375 TX/PRO/DX INJ NEW DRUG ADDON: CPT

## 2017-07-09 PROCEDURE — 82009 KETONE BODYS QUAL: CPT

## 2017-07-09 PROCEDURE — 96374 THER/PROPH/DIAG INJ IV PUSH: CPT

## 2017-07-09 PROCEDURE — 81001 URINALYSIS AUTO W/SCOPE: CPT

## 2017-07-09 PROCEDURE — 83930 ASSAY OF BLOOD OSMOLALITY: CPT

## 2017-07-09 PROCEDURE — 99285 EMERGENCY DEPT VISIT HI MDM: CPT

## 2017-07-09 PROCEDURE — 76770 US EXAM ABDO BACK WALL COMP: CPT

## 2017-07-09 PROCEDURE — 80053 COMPREHEN METABOLIC PANEL: CPT

## 2017-07-09 RX ORDER — ONDANSETRON 8 MG/1
4 TABLET, FILM COATED ORAL ONCE
Qty: 0 | Refills: 0 | Status: COMPLETED | OUTPATIENT
Start: 2017-07-09 | End: 2017-07-09

## 2017-07-09 RX ORDER — MORPHINE SULFATE 50 MG/1
4 CAPSULE, EXTENDED RELEASE ORAL ONCE
Qty: 0 | Refills: 0 | Status: DISCONTINUED | OUTPATIENT
Start: 2017-07-09 | End: 2017-07-09

## 2017-07-09 RX ORDER — KETOROLAC TROMETHAMINE 30 MG/ML
15 SYRINGE (ML) INJECTION ONCE
Qty: 0 | Refills: 0 | Status: DISCONTINUED | OUTPATIENT
Start: 2017-07-09 | End: 2017-07-09

## 2017-07-09 RX ORDER — SODIUM CHLORIDE 9 MG/ML
1000 INJECTION INTRAMUSCULAR; INTRAVENOUS; SUBCUTANEOUS
Qty: 0 | Refills: 0 | Status: DISCONTINUED | OUTPATIENT
Start: 2017-07-09 | End: 2017-07-13

## 2017-07-09 RX ORDER — INSULIN HUMAN 100 [IU]/ML
6 INJECTION, SOLUTION SUBCUTANEOUS ONCE
Qty: 0 | Refills: 0 | Status: COMPLETED | OUTPATIENT
Start: 2017-07-09 | End: 2017-07-09

## 2017-07-09 RX ORDER — SODIUM CHLORIDE 9 MG/ML
1000 INJECTION INTRAMUSCULAR; INTRAVENOUS; SUBCUTANEOUS ONCE
Qty: 0 | Refills: 0 | Status: DISCONTINUED | OUTPATIENT
Start: 2017-07-09 | End: 2017-07-09

## 2017-07-09 RX ADMIN — SODIUM CHLORIDE 250 MILLILITER(S): 9 INJECTION INTRAMUSCULAR; INTRAVENOUS; SUBCUTANEOUS at 13:09

## 2017-07-09 RX ADMIN — INSULIN HUMAN 6 UNIT(S): 100 INJECTION, SOLUTION SUBCUTANEOUS at 14:49

## 2017-07-09 RX ADMIN — SODIUM CHLORIDE 250 MILLILITER(S): 9 INJECTION INTRAMUSCULAR; INTRAVENOUS; SUBCUTANEOUS at 14:43

## 2017-07-09 RX ADMIN — MORPHINE SULFATE 4 MILLIGRAM(S): 50 CAPSULE, EXTENDED RELEASE ORAL at 16:56

## 2017-07-09 RX ADMIN — MORPHINE SULFATE 4 MILLIGRAM(S): 50 CAPSULE, EXTENDED RELEASE ORAL at 14:43

## 2017-07-09 RX ADMIN — ONDANSETRON 4 MILLIGRAM(S): 8 TABLET, FILM COATED ORAL at 14:43

## 2017-07-09 NOTE — ED PROVIDER NOTE - CARE PLAN
Principal Discharge DX:	Generalized abdominal pain  Secondary Diagnosis:	Diabetes type 2, uncontrolled  Secondary Diagnosis:	Back pain

## 2017-07-09 NOTE — ED PROVIDER NOTE - OBJECTIVE STATEMENT
43 y/o F pt with significant PMHx DM and HTN  and significant PSHx presents to the ED 43 y/o F pt with significant PMHx DM and HTN and significant PSHx presents to the ED c/o frequent urination x 3 days. Pt denies any nausea, vomiting, diarrhea or any other complaints. NKDA

## 2017-07-18 NOTE — ED PROVIDER NOTE - CPE EDP RESP NORM
"[facility]  Psychiatry  Progress Note    Patient Name: Luba Sanchez  MRN: [id number]   Code Status: Full Code  Admission Date: 6/29/2017  Hospital Length of Stay: 19 days  Expected Discharge Date: 7/19/2017  Attending Physician: Chel Singleton MD  Primary Care Provider: John Polanco MD    Current Legal Status: N/A    Patient information was obtained from patient, past medical records and ER records.     Subjective:     Principal Problem:Encephalitis    HPI: Per Dr. Noble initial H&P: "Ainsley is a 9y/o previously healthy M presenting with altered mental status starting 3 days ago. Per parents and records, patient had been sleepy for several days prior to onset, but woke three days ago agitated, confused, not making sense, wandering aimlessly. This persisted, and parents took him last night to the [facility] ED for evaluation, where he was given Ativan with resolution of this confused state and discharged. Symptoms recurred, and they returned to [facility] this AM, where, per staff report, he was tearful, intermittently sleeping, walking around anxiously, possibly responding to internal stimuli. The patient reportedly endorsed feeling and seeing things crawling on himself. Initial psychiatric impression was of organic etiology rather than pyschiatric, but family left AMA before completion of workup. They then presented to [facility] [place].   Of note, patient was seen on 6/19 at [facility] for evaluation of frontal headache and nasal congestion, found to have sinusitis and prescribed Tramadol, Sudafed and Augmentin. Family was using Tramadol and Sudafed intermittently for symptomatic relief and gave inconsistent doses of augmentin. Headache was improving, but persisted, so they presented to [facility] on 6/22, where he was treated for migraine and referred to neurology clinic. In the interval between this visit on 6/22 and three days ago, mom reports he was sleeping more " "than usual, but otherwise mentating appropriately." ~ Dr. Zhang 6/29/17    Today, patient was undergoing echocardiogram during our interaction, thus information included in this note is per parents.  They tell the xact timeline that Dr. Zhang included in her H&P. After clarifying the timeline, the patient's parents note that on Tuesday when Luba woke up, he was initially dizzy and was having difficulty walking. After that, during the day on Tuesday, his mother states that he "would start talking about other stuff" when asked a question and would never get around to actually addressing what he was asked. They noted that he had a slight tremor on Tuesday as well but "you had to really look for it". It was most prominent when reaching for things or holding his hands out. He has never had any of these symptoms before. He has never had a seizure. He has never had episodes of confusion or alterations of his speech/thought pattern. His parents report that he has never seen a psychiatrist and has never been on any medications for psychiatric reasons. They mention that he was more tired than usual on Monday, but otherwise he was doing well. He has been having significant headaches for the last 2 weeks, but his father notes that the headaches were better from last Thursday to Monday. His parents are both agreeable to medications that can help with the patient's confusion/sleep/agitation. They note that he only slept 3 hours last night.    Hospital Course: Patient admitted to [facility] 6/29/17    Psychiatry consulted for agitation and preliminary recs given 6/29/17    Full psychiatric evaluation 6/30/17 7/1 Patient sent to PICU for seizure. Patient intubated and unable to participate in psychiatric evaluation.  7/3 Patient continues to be intubated. Primary team thinks AMS likely due to autoimmune vs viral encephalitis.  7/4 Extubated and put on 3L NC.  7/5 Patient with gag reflex, minimal hand grasp bilaterally and no toe " "movement. Does not follow commands. Sedation last used 14 hrs prior to evaluation. Neuro status combination of remaining sedation and current prolonged mental status due to infection.  7/16 Per family, patient was more aware yesterday. He referred to his mother as "ma" and knew who his dad was. Restraints were off when mother present.  7/17 Made one coherent statement. Tracking mother and squeezed her hand. Did not follow any MD commands. Unrestrained.  7/18 Patient had 7 episodes of agitation overnight. Family refused Geodon. Did not follow commands. Unrestrained upon evaluation.    Interval History:   Patient had 7 episodes of agitation overnight. Family refused Geodon. Patient ended up receiving Melatonin around 3 am. Dysconjugate gaze noted by physician yesterday, and MRI ordered out of concern for brainstem involvement-not read at this time.    Patient does not respond, nor does he follow commands.    Per RN note, patient appeared to reach for something that was not there.    Psychotherapeutics     Start     Stop Route Frequency Ordered    07/17/17 1359  lorazepam injection 2 mg      -- IV Every 6 hours PRN 07/17/17 1259    07/12/17 1053  ziprasidone injection 10 mg      -- IM Every 2 hours PRN 07/12/17 0954    07/11/17 1530  dexmedetomidine (PRECEDEX) 400mcg/100mL 0.9% NaCL infusion (non-titrating)      -- IV Continuous 07/11/17 1531    07/01/17 0127  lorazepam (ATIVAN) 2 mg/mL injection     Comments:  Created by cabinet override    07/01 1344   07/01/17 0127    06/29/17 2152  olanzapine injection 5 mg      06/29 2359 IM Once as needed 06/29/17 2239           Review of Systems   Unable to perform ROS: Mental status change     Objective:     Vital Signs (Most Recent):  Temp: 97.2 °F (36.2 °C) (07/18/17 0800)  Pulse: (!) 111 (07/18/17 0800)  Resp: 18 (07/18/17 0800)  BP: 111/74 (07/18/17 0800)  SpO2: 100 % (07/18/17 0800) Vital Signs (24h Range):  Temp:  [96.5 °F (35.8 °C)-99.6 °F (37.6 °C)] 97.2 °F (36.2 " "°C)  Pulse:  [] 111  Resp:  [10-29] 18  SpO2:  [94 %-100 %] 100 %  BP: ()/(47-77) 111/74     Height: 5' 2" (157.5 cm)  Weight: 45.2 kg (99 lb 9.6 oz)  Body mass index is 18.22 kg/m².      Intake/Output Summary (Last 24 hours) at 07/18/17 0937  Last data filed at 07/18/17 0800   Gross per 24 hour   Intake           2075.8 ml   Output              947 ml   Net           1128.8 ml       Physical Exam   Psychiatric:   MSE  General Appearance: appears stated age, lying in bed, unrestrained  Behavior: does not respond to any questions or follow any commands  Level of Consciousness: somnolent, opens eyes briefly but does not track writer  Orientation: unable to assess  Grooming: fair  Psychomotor Behavior: +pmr   Speech: paucity of speech  Language: English  Mood: neutral  Affect: flat  Thought Process: paucity of thought  Thought Content: per RN notes, reaching for something not there  Associations: unable to assess  Memory: unable to assess  Attention: impaired, variable  Fund of Knowledge: unable to assess  Insight: limited due to condition  Judgment: limited due to condition  Reliability: limited due to condition        Significant Labs:   Last 24 Hours:   Recent Lab Results     None          Significant Imaging: I have reviewed all pertinent imaging results/findings within the past 24 hours.    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    - Will avoid Haldol as primary team was concerned it caused a dystonic reaction. Will also avoid Zyprexa for now as notes from primary team express concern that it caused increased agitation  -Patient has received multiple doses of PRN Geodon without apparent adverse reaction    -Recommend starting Geodon 10mg BID PO/per NG tube (can also be given " IM if needed)  -Additional Geodon 10mg PO q6h PRN severe, nonredirectable agitation (Maxiumum of 2 PRN doses daily; total daily dose not to exceed 40mg for now)  -Monitor carefully for oversedation    -Patient's improvement after being given Ativan for likely seizure today suggests possible catatonia component to his current presentation. If patient does not improve with scheduled antipsychotic, might consider trial of scheduled benzodiazepine.     -Family not interested in any antipsychotics at this time. Primary team will contact psychiatry again for any further recs needed.    Psychiatry will sign off. Please re-consult with any further questions.             Need for Continued Hospitalization:   No need for inpatient psychiatric hospitalization. Continue medical care as per the primary team.    Anticipated Disposition: Home or Self Care    Laure Alejo MD   Psychiatry  [facility]   normal...

## 2017-09-07 ENCOUNTER — APPOINTMENT (OUTPATIENT)
Dept: LAB | Age: 42
End: 2017-09-07
Attending: PREVENTIVE MEDICINE

## 2017-09-07 ENCOUNTER — TRANSCRIBE ORDERS (OUTPATIENT)
Dept: ADMINISTRATIVE | Age: 42
End: 2017-09-07

## 2017-09-07 DIAGNOSIS — Z02.1 PRE-EMPLOYMENT HEALTH SCREENING EXAMINATION: Primary | ICD-10-CM

## 2017-09-07 DIAGNOSIS — Z02.1 PRE-EMPLOYMENT HEALTH SCREENING EXAMINATION: ICD-10-CM

## 2017-09-07 LAB — RUBV IGG SERPL IA-ACNC: >175 IU/ML

## 2017-09-07 PROCEDURE — 86735 MUMPS ANTIBODY: CPT

## 2017-09-07 PROCEDURE — 86787 VARICELLA-ZOSTER ANTIBODY: CPT

## 2017-09-07 PROCEDURE — 86765 RUBEOLA ANTIBODY: CPT

## 2017-09-07 PROCEDURE — 86480 TB TEST CELL IMMUN MEASURE: CPT

## 2017-09-07 PROCEDURE — 36415 COLL VENOUS BLD VENIPUNCTURE: CPT

## 2017-09-07 PROCEDURE — 86762 RUBELLA ANTIBODY: CPT

## 2017-09-09 LAB
ANNOTATION COMMENT IMP: NORMAL
GAMMA INTERFERON BACKGROUND BLD IA-ACNC: 0.14 IU/ML
M TB IFN-G BLD-IMP: NEGATIVE
M TB IFN-G CD4+ BCKGRND COR BLD-ACNC: <0.01 IU/ML
M TB IFN-G CD4+ T-CELLS BLD-ACNC: 0.05 IU/ML
MITOGEN IGNF BLD-ACNC: 7.93 IU/ML
QUANTIFERON-TB GOLD IN TUBE: NORMAL
SERVICE CMNT-IMP: NORMAL

## 2017-09-12 LAB
MEV IGG SER QL: NORMAL
MUV IGG SER QL: NORMAL
VZV IGG SER IA-ACNC: NORMAL

## 2017-09-20 ENCOUNTER — EMERGENCY (EMERGENCY)
Facility: HOSPITAL | Age: 42
LOS: 1 days | Discharge: HOME | End: 2017-09-20
Attending: EMERGENCY MEDICINE
Payer: COMMERCIAL

## 2017-09-20 VITALS
HEIGHT: 66 IN | WEIGHT: 238.1 LBS | DIASTOLIC BLOOD PRESSURE: 84 MMHG | HEART RATE: 100 BPM | TEMPERATURE: 98 F | SYSTOLIC BLOOD PRESSURE: 127 MMHG | OXYGEN SATURATION: 97 % | RESPIRATION RATE: 16 BRPM

## 2017-09-20 VITALS
OXYGEN SATURATION: 99 % | TEMPERATURE: 147 F | DIASTOLIC BLOOD PRESSURE: 98 MMHG | HEART RATE: 106 BPM | SYSTOLIC BLOOD PRESSURE: 146 MMHG | RESPIRATION RATE: 16 BRPM

## 2017-09-20 DIAGNOSIS — K52.9 NONINFECTIVE GASTROENTERITIS AND COLITIS, UNSPECIFIED: ICD-10-CM

## 2017-09-20 DIAGNOSIS — Z87.19 PERSONAL HISTORY OF OTHER DISEASES OF THE DIGESTIVE SYSTEM: ICD-10-CM

## 2017-09-20 DIAGNOSIS — I10 ESSENTIAL (PRIMARY) HYPERTENSION: ICD-10-CM

## 2017-09-20 DIAGNOSIS — Z90.49 ACQUIRED ABSENCE OF OTHER SPECIFIED PARTS OF DIGESTIVE TRACT: ICD-10-CM

## 2017-09-20 DIAGNOSIS — I42.9 CARDIOMYOPATHY, UNSPECIFIED: ICD-10-CM

## 2017-09-20 DIAGNOSIS — Z79.84 LONG TERM (CURRENT) USE OF ORAL HYPOGLYCEMIC DRUGS: ICD-10-CM

## 2017-09-20 DIAGNOSIS — Z90.49 ACQUIRED ABSENCE OF OTHER SPECIFIED PARTS OF DIGESTIVE TRACT: Chronic | ICD-10-CM

## 2017-09-20 DIAGNOSIS — Z86.008 PERSONAL HISTORY OF IN-SITU NEOPLASM OF OTHER SITE: ICD-10-CM

## 2017-09-20 DIAGNOSIS — J45.909 UNSPECIFIED ASTHMA, UNCOMPLICATED: ICD-10-CM

## 2017-09-20 DIAGNOSIS — Z88.1 ALLERGY STATUS TO OTHER ANTIBIOTIC AGENTS STATUS: ICD-10-CM

## 2017-09-20 DIAGNOSIS — Z86.711 PERSONAL HISTORY OF PULMONARY EMBOLISM: ICD-10-CM

## 2017-09-20 DIAGNOSIS — E78.00 PURE HYPERCHOLESTEROLEMIA, UNSPECIFIED: ICD-10-CM

## 2017-09-20 DIAGNOSIS — Z95.810 PRESENCE OF AUTOMATIC (IMPLANTABLE) CARDIAC DEFIBRILLATOR: ICD-10-CM

## 2017-09-20 DIAGNOSIS — E11.9 TYPE 2 DIABETES MELLITUS WITHOUT COMPLICATIONS: ICD-10-CM

## 2017-09-20 DIAGNOSIS — Z79.02 LONG TERM (CURRENT) USE OF ANTITHROMBOTICS/ANTIPLATELETS: ICD-10-CM

## 2017-09-20 DIAGNOSIS — Z79.82 LONG TERM (CURRENT) USE OF ASPIRIN: ICD-10-CM

## 2017-09-20 DIAGNOSIS — B19.10 UNSPECIFIED VIRAL HEPATITIS B WITHOUT HEPATIC COMA: ICD-10-CM

## 2017-09-20 DIAGNOSIS — N28.9 DISORDER OF KIDNEY AND URETER, UNSPECIFIED: ICD-10-CM

## 2017-09-20 LAB
ALBUMIN SERPL ELPH-MCNC: 3.3 G/DL — LOW (ref 3.5–5)
ALP SERPL-CCNC: 67 U/L — SIGNIFICANT CHANGE UP (ref 40–120)
ALT FLD-CCNC: 23 U/L DA — SIGNIFICANT CHANGE UP (ref 10–60)
ANION GAP SERPL CALC-SCNC: 8 MMOL/L — SIGNIFICANT CHANGE UP (ref 5–17)
AST SERPL-CCNC: 15 U/L — SIGNIFICANT CHANGE UP (ref 10–40)
BILIRUB SERPL-MCNC: 0.8 MG/DL — SIGNIFICANT CHANGE UP (ref 0.2–1.2)
BUN SERPL-MCNC: 13 MG/DL — SIGNIFICANT CHANGE UP (ref 7–18)
CALCIUM SERPL-MCNC: 9 MG/DL — SIGNIFICANT CHANGE UP (ref 8.4–10.5)
CHLORIDE SERPL-SCNC: 103 MMOL/L — SIGNIFICANT CHANGE UP (ref 96–108)
CO2 SERPL-SCNC: 28 MMOL/L — SIGNIFICANT CHANGE UP (ref 22–31)
CREAT SERPL-MCNC: 0.97 MG/DL — SIGNIFICANT CHANGE UP (ref 0.5–1.3)
GLUCOSE SERPL-MCNC: 203 MG/DL — HIGH (ref 70–99)
HCT VFR BLD CALC: 37.3 % — SIGNIFICANT CHANGE UP (ref 34.5–45)
HGB BLD-MCNC: 11.9 G/DL — SIGNIFICANT CHANGE UP (ref 11.5–15.5)
LIDOCAIN IGE QN: 183 U/L — SIGNIFICANT CHANGE UP (ref 73–393)
MCHC RBC-ENTMCNC: 24.6 PG — LOW (ref 27–34)
MCHC RBC-ENTMCNC: 32 GM/DL — SIGNIFICANT CHANGE UP (ref 32–36)
MCV RBC AUTO: 76.8 FL — LOW (ref 80–100)
PLATELET # BLD AUTO: 216 K/UL — SIGNIFICANT CHANGE UP (ref 150–400)
POTASSIUM SERPL-MCNC: 3.9 MMOL/L — SIGNIFICANT CHANGE UP (ref 3.5–5.3)
POTASSIUM SERPL-SCNC: 3.9 MMOL/L — SIGNIFICANT CHANGE UP (ref 3.5–5.3)
PROT SERPL-MCNC: 7.4 G/DL — SIGNIFICANT CHANGE UP (ref 6–8.3)
RBC # BLD: 4.86 M/UL — SIGNIFICANT CHANGE UP (ref 3.8–5.2)
RBC # FLD: 13.2 % — SIGNIFICANT CHANGE UP (ref 10.3–14.5)
SODIUM SERPL-SCNC: 139 MMOL/L — SIGNIFICANT CHANGE UP (ref 135–145)
WBC # BLD: 6 K/UL — SIGNIFICANT CHANGE UP (ref 3.8–10.5)
WBC # FLD AUTO: 6 K/UL — SIGNIFICANT CHANGE UP (ref 3.8–10.5)

## 2017-09-20 PROCEDURE — 74177 CT ABD & PELVIS W/CONTRAST: CPT | Mod: 26

## 2017-09-20 PROCEDURE — 99284 EMERGENCY DEPT VISIT MOD MDM: CPT | Mod: 25

## 2017-09-20 PROCEDURE — 99284 EMERGENCY DEPT VISIT MOD MDM: CPT

## 2017-09-20 PROCEDURE — 85027 COMPLETE CBC AUTOMATED: CPT

## 2017-09-20 PROCEDURE — 96374 THER/PROPH/DIAG INJ IV PUSH: CPT | Mod: XU

## 2017-09-20 PROCEDURE — 74177 CT ABD & PELVIS W/CONTRAST: CPT

## 2017-09-20 PROCEDURE — 96375 TX/PRO/DX INJ NEW DRUG ADDON: CPT

## 2017-09-20 PROCEDURE — 83690 ASSAY OF LIPASE: CPT

## 2017-09-20 PROCEDURE — 36415 COLL VENOUS BLD VENIPUNCTURE: CPT

## 2017-09-20 PROCEDURE — 80053 COMPREHEN METABOLIC PANEL: CPT

## 2017-09-20 RX ORDER — ACETAMINOPHEN 500 MG
650 TABLET ORAL ONCE
Qty: 0 | Refills: 0 | Status: COMPLETED | OUTPATIENT
Start: 2017-09-20 | End: 2017-09-20

## 2017-09-20 RX ORDER — MORPHINE SULFATE 50 MG/1
4 CAPSULE, EXTENDED RELEASE ORAL ONCE
Qty: 0 | Refills: 0 | Status: DISCONTINUED | OUTPATIENT
Start: 2017-09-20 | End: 2017-09-20

## 2017-09-20 RX ORDER — KETOROLAC TROMETHAMINE 30 MG/ML
30 SYRINGE (ML) INJECTION ONCE
Qty: 0 | Refills: 0 | Status: DISCONTINUED | OUTPATIENT
Start: 2017-09-20 | End: 2017-09-20

## 2017-09-20 RX ADMIN — Medication 20 MILLIGRAM(S): at 16:29

## 2017-09-20 RX ADMIN — MORPHINE SULFATE 4 MILLIGRAM(S): 50 CAPSULE, EXTENDED RELEASE ORAL at 14:50

## 2017-09-20 RX ADMIN — MORPHINE SULFATE 4 MILLIGRAM(S): 50 CAPSULE, EXTENDED RELEASE ORAL at 14:17

## 2017-09-20 NOTE — ED PROVIDER NOTE - SKIN, MLM
Skin normal color for race, warm, dry and intact. No evidence of rash. Abrasion to R foot is healing.

## 2017-09-20 NOTE — ED ADULT TRIAGE NOTE - CHIEF COMPLAINT QUOTE
C/o " left abdominal pain x 1 week with diarrhea for past few days. On my right foot I have a wound I need checked ou and I have left ear pain

## 2017-09-20 NOTE — ED PROVIDER NOTE - MEDICAL DECISION MAKING DETAILS
sx improved with fluids and meds  labs and ct reassuring. discussed renal lesions with pt, who plans to follow up   Discussed anticipatory guidance and return precautions. Discussed results and gave copy to pt.  Dc with outpt follow up.

## 2017-09-20 NOTE — ED PROVIDER NOTE - OBJECTIVE STATEMENT
41 y/o F pt with a significant PMHx of HTN, DM, cardiomyopathy (AICD in place), stomach ulcer an a significant PSHx of appendectomy, resection of small intestine and hernia repair presents to the ED c/o abd pain, diarrhea (last episode x1 hour ago) x1 week. Pt also notes that she had some dead skin on her R foot that she peeled off; pt states her wound was oozing discharge x yesterday and notes associated pain. Pt also reports to have L ear pain. Pt denies nausea, vomiting, fever, chills, trouble urinating or any other complaints. Allergies: Cipro. LMP- this month. 43 y/o F pt with a significant PMHx of HTN, DM, cardiomyopathy (AICD in place), stomach ulcer an a significant PSHx of appendectomy, resection of small intestine and hernia repair presents to the ED c/o abd pain, diarrhea (last episode x1 hour ago) x1 week. Pt also notes that she had some dead skin on her R foot that she peeled off; notes associated pain. Pt also reports to have L ear pain. Pt denies nausea, vomiting, fever, chills, trouble urinating or any other complaints. Allergies: Cipro. LMP- this month.

## 2017-11-02 ENCOUNTER — HOSPITAL ENCOUNTER (OUTPATIENT)
Facility: HOSPITAL | Age: 42
Setting detail: OBSERVATION
Discharge: HOME | End: 2017-11-03
Attending: EMERGENCY MEDICINE | Admitting: INTERNAL MEDICINE
Payer: COMMERCIAL

## 2017-11-02 ENCOUNTER — APPOINTMENT (EMERGENCY)
Dept: RADIOLOGY | Facility: HOSPITAL | Age: 42
End: 2017-11-02
Payer: COMMERCIAL

## 2017-11-02 DIAGNOSIS — I42.9 CARDIOMYOPATHY (HCC): Chronic | ICD-10-CM

## 2017-11-02 DIAGNOSIS — R00.0 TACHYCARDIA: ICD-10-CM

## 2017-11-02 DIAGNOSIS — Z91.14 NONCOMPLIANCE WITH MEDICATION REGIMEN: ICD-10-CM

## 2017-11-02 DIAGNOSIS — R07.9 CHEST PAIN: Primary | ICD-10-CM

## 2017-11-02 DIAGNOSIS — R06.00 DYSPNEA: ICD-10-CM

## 2017-11-02 PROBLEM — I10 ESSENTIAL HYPERTENSION: Chronic | Status: ACTIVE | Noted: 2017-11-02

## 2017-11-02 PROBLEM — R19.7 DIARRHEA: Status: ACTIVE | Noted: 2017-11-02

## 2017-11-02 PROBLEM — E11.9 TYPE 2 DIABETES MELLITUS (HCC): Chronic | Status: ACTIVE | Noted: 2017-11-02

## 2017-11-02 PROBLEM — I26.99 PULMONARY EMBOLI (HCC): Chronic | Status: ACTIVE | Noted: 2017-11-02

## 2017-11-02 PROBLEM — I25.10 CAD (CORONARY ARTERY DISEASE): Chronic | Status: ACTIVE | Noted: 2017-11-02

## 2017-11-02 LAB
ALBUMIN SERPL BCP-MCNC: 3.4 G/DL (ref 3.5–5)
ALP SERPL-CCNC: 69 U/L (ref 46–116)
ALT SERPL W P-5'-P-CCNC: 27 U/L (ref 12–78)
ANION GAP SERPL CALCULATED.3IONS-SCNC: 8 MMOL/L (ref 4–13)
APTT PPP: 26 SECONDS (ref 23–35)
AST SERPL W P-5'-P-CCNC: 14 U/L (ref 5–45)
ATRIAL RATE: 113 BPM
BASOPHILS # BLD AUTO: 0.01 THOUSANDS/ΜL (ref 0–0.1)
BASOPHILS NFR BLD AUTO: 0 % (ref 0–1)
BILIRUB SERPL-MCNC: 1.34 MG/DL (ref 0.2–1)
BUN SERPL-MCNC: 15 MG/DL (ref 5–25)
CALCIUM SERPL-MCNC: 9.6 MG/DL (ref 8.3–10.1)
CHLORIDE SERPL-SCNC: 103 MMOL/L (ref 100–108)
CO2 SERPL-SCNC: 25 MMOL/L (ref 21–32)
CREAT SERPL-MCNC: 1 MG/DL (ref 0.6–1.3)
DEPRECATED D DIMER PPP: 674 NG/ML (FEU) (ref 0–424)
EOSINOPHIL # BLD AUTO: 0.03 THOUSAND/ΜL (ref 0–0.61)
EOSINOPHIL NFR BLD AUTO: 1 % (ref 0–6)
ERYTHROCYTE [DISTWIDTH] IN BLOOD BY AUTOMATED COUNT: 13.7 % (ref 11.6–15.1)
GFR SERPL CREATININE-BSD FRML MDRD: 79 ML/MIN/1.73SQ M
GLUCOSE SERPL-MCNC: 219 MG/DL (ref 65–140)
GLUCOSE SERPL-MCNC: 292 MG/DL (ref 65–140)
HCT VFR BLD AUTO: 35.1 % (ref 34.8–46.1)
HGB BLD-MCNC: 12.1 G/DL (ref 11.5–15.4)
INR PPP: 0.99 (ref 0.86–1.16)
LYMPHOCYTES # BLD AUTO: 1.09 THOUSANDS/ΜL (ref 0.6–4.47)
LYMPHOCYTES NFR BLD AUTO: 19 % (ref 14–44)
MCH RBC QN AUTO: 24.3 PG (ref 26.8–34.3)
MCHC RBC AUTO-ENTMCNC: 34.5 G/DL (ref 31.4–37.4)
MCV RBC AUTO: 71 FL (ref 82–98)
MONOCYTES # BLD AUTO: 0.23 THOUSAND/ΜL (ref 0.17–1.22)
MONOCYTES NFR BLD AUTO: 4 % (ref 4–12)
NEUTROPHILS # BLD AUTO: 4.4 THOUSANDS/ΜL (ref 1.85–7.62)
NEUTS SEG NFR BLD AUTO: 76 % (ref 43–75)
NRBC BLD AUTO-RTO: 0 /100 WBCS
NT-PROBNP SERPL-MCNC: 598 PG/ML
P AXIS: 47 DEGREES
PLATELET # BLD AUTO: 246 THOUSANDS/UL (ref 149–390)
PMV BLD AUTO: 10.6 FL (ref 8.9–12.7)
POTASSIUM SERPL-SCNC: 4.1 MMOL/L (ref 3.5–5.3)
PR INTERVAL: 140 MS
PROT SERPL-MCNC: 7.8 G/DL (ref 6.4–8.2)
PROTHROMBIN TIME: 13.1 SECONDS (ref 12.1–14.4)
QRS AXIS: -20 DEGREES
QRSD INTERVAL: 88 MS
QT INTERVAL: 334 MS
QTC INTERVAL: 458 MS
RBC # BLD AUTO: 4.98 MILLION/UL (ref 3.81–5.12)
SODIUM SERPL-SCNC: 136 MMOL/L (ref 136–145)
SPECIMEN SOURCE: NORMAL
T WAVE AXIS: 79 DEGREES
TROPONIN I BLD-MCNC: 0 NG/ML (ref 0–0.08)
TROPONIN I SERPL-MCNC: <0.02 NG/ML
TROPONIN I SERPL-MCNC: <0.02 NG/ML
VENTRICULAR RATE: 113 BPM
WBC # BLD AUTO: 5.77 THOUSAND/UL (ref 4.31–10.16)

## 2017-11-02 PROCEDURE — 83880 ASSAY OF NATRIURETIC PEPTIDE: CPT | Performed by: EMERGENCY MEDICINE

## 2017-11-02 PROCEDURE — 84484 ASSAY OF TROPONIN QUANT: CPT

## 2017-11-02 PROCEDURE — 36415 COLL VENOUS BLD VENIPUNCTURE: CPT

## 2017-11-02 PROCEDURE — 80053 COMPREHEN METABOLIC PANEL: CPT | Performed by: EMERGENCY MEDICINE

## 2017-11-02 PROCEDURE — 84484 ASSAY OF TROPONIN QUANT: CPT | Performed by: INTERNAL MEDICINE

## 2017-11-02 PROCEDURE — 94640 AIRWAY INHALATION TREATMENT: CPT

## 2017-11-02 PROCEDURE — 71020 HB CHEST X-RAY 2VW FRONTAL&LATL: CPT

## 2017-11-02 PROCEDURE — 99285 EMERGENCY DEPT VISIT HI MDM: CPT

## 2017-11-02 PROCEDURE — 82948 REAGENT STRIP/BLOOD GLUCOSE: CPT

## 2017-11-02 PROCEDURE — 71275 CT ANGIOGRAPHY CHEST: CPT

## 2017-11-02 PROCEDURE — 85730 THROMBOPLASTIN TIME PARTIAL: CPT | Performed by: EMERGENCY MEDICINE

## 2017-11-02 PROCEDURE — 85025 COMPLETE CBC W/AUTO DIFF WBC: CPT | Performed by: EMERGENCY MEDICINE

## 2017-11-02 PROCEDURE — 85379 FIBRIN DEGRADATION QUANT: CPT | Performed by: EMERGENCY MEDICINE

## 2017-11-02 PROCEDURE — 93005 ELECTROCARDIOGRAM TRACING: CPT | Performed by: EMERGENCY MEDICINE

## 2017-11-02 PROCEDURE — 96374 THER/PROPH/DIAG INJ IV PUSH: CPT

## 2017-11-02 PROCEDURE — 85610 PROTHROMBIN TIME: CPT | Performed by: EMERGENCY MEDICINE

## 2017-11-02 RX ORDER — SODIUM CHLORIDE 9 MG/ML
50 INJECTION, SOLUTION INTRAVENOUS ONCE
Status: COMPLETED | OUTPATIENT
Start: 2017-11-02 | End: 2017-11-02

## 2017-11-02 RX ORDER — INSULIN GLARGINE 100 [IU]/ML
10 INJECTION, SOLUTION SUBCUTANEOUS
Status: DISCONTINUED | OUTPATIENT
Start: 2017-11-02 | End: 2017-11-03 | Stop reason: HOSPADM

## 2017-11-02 RX ORDER — ACETAMINOPHEN 325 MG/1
650 TABLET ORAL EVERY 6 HOURS PRN
Status: DISCONTINUED | OUTPATIENT
Start: 2017-11-02 | End: 2017-11-03 | Stop reason: HOSPADM

## 2017-11-02 RX ORDER — ONDANSETRON 2 MG/ML
4 INJECTION INTRAMUSCULAR; INTRAVENOUS EVERY 6 HOURS PRN
Status: DISCONTINUED | OUTPATIENT
Start: 2017-11-02 | End: 2017-11-03 | Stop reason: HOSPADM

## 2017-11-02 RX ORDER — MORPHINE SULFATE 10 MG/ML
6 INJECTION, SOLUTION INTRAMUSCULAR; INTRAVENOUS ONCE
Status: COMPLETED | OUTPATIENT
Start: 2017-11-02 | End: 2017-11-02

## 2017-11-02 RX ORDER — LISINOPRIL 10 MG/1
10 TABLET ORAL DAILY
COMMUNITY

## 2017-11-02 RX ORDER — LISINOPRIL 10 MG/1
10 TABLET ORAL DAILY
Status: DISCONTINUED | OUTPATIENT
Start: 2017-11-03 | End: 2017-11-03 | Stop reason: HOSPADM

## 2017-11-02 RX ORDER — TRAMADOL HYDROCHLORIDE 50 MG/1
50 TABLET ORAL EVERY 6 HOURS PRN
Status: DISCONTINUED | OUTPATIENT
Start: 2017-11-02 | End: 2017-11-03 | Stop reason: HOSPADM

## 2017-11-02 RX ORDER — ALBUTEROL SULFATE 2.5 MG/3ML
5 SOLUTION RESPIRATORY (INHALATION) ONCE
Status: COMPLETED | OUTPATIENT
Start: 2017-11-02 | End: 2017-11-02

## 2017-11-02 RX ORDER — ASPIRIN 81 MG/1
162 TABLET ORAL DAILY
Status: DISCONTINUED | OUTPATIENT
Start: 2017-11-02 | End: 2017-11-03 | Stop reason: HOSPADM

## 2017-11-02 RX ORDER — MORPHINE SULFATE 2 MG/ML
2 INJECTION, SOLUTION INTRAMUSCULAR; INTRAVENOUS EVERY 4 HOURS PRN
Status: DISCONTINUED | OUTPATIENT
Start: 2017-11-02 | End: 2017-11-03 | Stop reason: HOSPADM

## 2017-11-02 RX ORDER — CARVEDILOL 12.5 MG/1
12.5 TABLET ORAL 2 TIMES DAILY WITH MEALS
Status: DISCONTINUED | OUTPATIENT
Start: 2017-11-02 | End: 2017-11-02

## 2017-11-02 RX ORDER — CARVEDILOL 12.5 MG/1
12.5 TABLET ORAL 2 TIMES DAILY WITH MEALS
COMMUNITY

## 2017-11-02 RX ORDER — CARVEDILOL 12.5 MG/1
12.5 TABLET ORAL 2 TIMES DAILY WITH MEALS
Status: DISCONTINUED | OUTPATIENT
Start: 2017-11-03 | End: 2017-11-03 | Stop reason: HOSPADM

## 2017-11-02 RX ORDER — NITROGLYCERIN 0.4 MG/1
0.4 TABLET SUBLINGUAL
COMMUNITY

## 2017-11-02 RX ORDER — METFORMIN HYDROCHLORIDE 500 MG/1
TABLET, EXTENDED RELEASE ORAL 2 TIMES DAILY WITH MEALS
COMMUNITY

## 2017-11-02 RX ORDER — NITROGLYCERIN 0.4 MG/1
0.4 TABLET SUBLINGUAL
Status: DISCONTINUED | OUTPATIENT
Start: 2017-11-02 | End: 2017-11-03 | Stop reason: HOSPADM

## 2017-11-02 RX ORDER — ASPIRIN 81 MG/1
81 TABLET ORAL DAILY
COMMUNITY

## 2017-11-02 RX ORDER — CALCIUM CARBONATE 200(500)MG
1000 TABLET,CHEWABLE ORAL DAILY PRN
Status: DISCONTINUED | OUTPATIENT
Start: 2017-11-02 | End: 2017-11-03 | Stop reason: HOSPADM

## 2017-11-02 RX ADMIN — MORPHINE SULFATE 2 MG: 2 INJECTION, SOLUTION INTRAMUSCULAR; INTRAVENOUS at 23:23

## 2017-11-02 RX ADMIN — INSULIN GLARGINE 10 UNITS: 100 INJECTION, SOLUTION SUBCUTANEOUS at 21:06

## 2017-11-02 RX ADMIN — SODIUM CHLORIDE 50 ML/HR: 0.9 INJECTION, SOLUTION INTRAVENOUS at 19:54

## 2017-11-02 RX ADMIN — MORPHINE SULFATE 6 MG: 10 INJECTION, SOLUTION INTRAMUSCULAR; INTRAVENOUS at 12:39

## 2017-11-02 RX ADMIN — TRAMADOL HYDROCHLORIDE 50 MG: 50 TABLET, FILM COATED ORAL at 20:09

## 2017-11-02 RX ADMIN — MORPHINE SULFATE 6 MG: 10 INJECTION, SOLUTION INTRAMUSCULAR; INTRAVENOUS at 15:40

## 2017-11-02 RX ADMIN — ASPIRIN 162 MG: 81 TABLET, COATED ORAL at 20:57

## 2017-11-02 RX ADMIN — IOHEXOL 85 ML: 350 INJECTION, SOLUTION INTRAVENOUS at 16:15

## 2017-11-02 RX ADMIN — APIXABAN 5 MG: 5 TABLET, FILM COATED ORAL at 20:07

## 2017-11-02 RX ADMIN — ALBUTEROL SULFATE 5 MG: 2.5 SOLUTION RESPIRATORY (INHALATION) at 12:40

## 2017-11-02 RX ADMIN — IPRATROPIUM BROMIDE 0.5 MG: 0.5 SOLUTION RESPIRATORY (INHALATION) at 12:40

## 2017-11-02 RX ADMIN — INSULIN LISPRO 2 UNITS: 100 INJECTION, SOLUTION INTRAVENOUS; SUBCUTANEOUS at 21:04

## 2017-11-02 RX ADMIN — SODIUM CHLORIDE 1000 ML: 0.9 INJECTION, SOLUTION INTRAVENOUS at 17:26

## 2017-11-02 NOTE — LETTER
November 3, 2017     Patient: Kaykay Vaca   YOB: 1975   Date of Visit: 11/2/2017       To Whom It May Concern: It is my medical opinion that Kaykay Vaca may return to full duty immediately with no restrictions  Please excuse her from work 11/2-11/3 as her medical condition required inpatient hospitalization  If you have any questions or concerns, please don't hesitate to call           Sincerely,          Shital Warren [facility]   [phone]

## 2017-11-02 NOTE — ED PROVIDER NOTES
Final Diagnosis:  1  Chest pain    2  Dyspnea    3  Tachycardia    4  Noncompliance with medication regimen    5  Cardiomyopathy [facility])      Chief Complaint   Patient presents with    Chest Pain     Dull aching chest pain that started last night  Took 1 nitro with minimal relief  Woke up this morning with some chest pain with radiating into left arm  This is a 38year old female presenting for evaluation of CP  The patient states that she was doing well until last night when getting back to her hotel she started to notice left sided chest pain going to her left shoulder with associated SOB that felt like a previous PE  It was continuous, unrelated to exertion  Denies associated f/ch/n/v  Denies diaphoresis  Denies jaw pain back pain  She is non-compliant with almost all of her medications for 24-48 hours  Denies any upper respiratory tract infection symptoms (cough, congestion, rhinorrhea, sore throat)  Denies any urinary tract infection symptoms (burning, itching, pain, blood, frequency)  Denies rashes  The symptoms didn't go away with motrin at home  +mild dizziness  Did not feel AICD fire  Today continues to have CP to the left arm so came in for evaluation  PMH:  - Post-partum cardiomyopathy x6 years  - AICD  - HTN  - DM  - Hx of PE  PSH:  - AICD placement  No smoking, rare alcohol  No cocaine/drugs  Travels from [facility]  PE:   Vitals:    11/03/17 0309 11/03/17 0600 11/03/17 0715 11/03/17 0921   BP: 129/70  111/69 101/62   Pulse: 97  99    Resp: 18  18    Temp: 97 9 °F (36 6 °C)  98 5 °F (36 9 °C)    TempSrc: Oral  Oral    SpO2: 97%  98%    Weight:  106 kg (233 lb 0 4 oz)     Height:       General: VSS, NAD, awake, alert  Well-nourished, well-developed  Appears stated age  Head: Normocephalic, atraumatic, nontender  Eyes: PERRL, EOM-I  No diplopia  No hyphema  No subconjunctival hemorrhages  Symmetrical lids  ENT: Atraumatic external nose and ears  MMM  No malocclusion  No stridor  Normal phonation  No drooling  Normal swallowing  Neck: Symmetric, trachea midline  No JVD  CV: Tachycardic  +S1/S2  No murmurs or gallops  Peripheral pulses +2 throughout  No chest wall tenderness  Lungs:   Unlabored No retractions  Wheezing bilaterally, end expiratory   No crepitus  No tachypnea  No paradoxical motion  Abd: +BS, soft, NT/ND  Obese   MSK:   FROM   No lower extremity edema  Back:   No CVAT  Skin: Dry, intact  Neuro: AAOx3, GCS 15, CN II-XII grossly intact  Motor grossly intact  Psychiatric/Behavioral: Appropriate mood and affect   Exam: deferred  A:  - Dyspnea  - CP  - "feels like a PE"  - tachycardia  - noncompliance with medications  P:  - Patient is non-compliant with all of her medications  She has not taken a beta-blocker for about 24-48 hours  Will replete as this might be the reason for her tachycardia  - D-dimer as she hasn't taken eliquis for 3-4 days, has tachycardia + dyspnea and says, "it kind of feels like a PE"  - Low threshold for CT PE protocol  - Will do cardiac evaluation in the interim  - Patient refusing toradol; one dose of narcotics while waiting for further evaluation  - 13 point ROS was performed and all are normal unless stated in the history above  - Nursing note reviewed  Vitals reviewed  - Orders placed by myself and/or advanced practitioner / resident     - Previous chart was not reviewed  - No language barrier    - History obtained from patient  - There are no limitations to the history obtained  - Critical care time: Not applicable for this patient       ED Course as of Nov 03 1002   Thu Nov 02, 2017   1225 Procedure Note: EKG  Date/Time: 11/02/17 12:25 PM   Performed by: Marina Paget by: Holden Macias   Indications / Diagnosis: CP  ECG reviewed by me, the ED Provider: yes   The EKG demonstrates:  Rhythm:  ST   Intervals: normal intervals  Axis: normal axis  QRS/Blocks: normal QRS  ST Changes: PANCHO in V1 without reciprocal changes  Looks more like LVH changes  1352 D-DIMER QUANTITATIVE: (!) 878   1434 Updated patient about the elevated d-dimer  PE risk, Wells score = [3]   (0) clinically suspected DVT - 3 points   (0) alternative diagnosis is less likely than PE - 3 0 points   (1 5) tachycardia - 1 5 points   (0) immobilization/surgery in previous four weeks - 1 5 points   (1 5) history of DVT or PE - 1 5 points   (0) hemoptysis - 1 0 points   (0) malignancy (treatment for within 6 months, palliative) - 1 0 points   Interpretation Romie Felix et al  2007 Radiology [id number]):   Score 2 0 to 6 0 - Moderate (probability 29% based on pooled data)     1644 CT negative for life threatening process  Continued tachcycardia though  Continued SOB  Will discuss admission with the patient    [phone] Will admit for evaluation of SOB, persistent tachycardia       2049 Doctor in [employer]      Medications   apixaban (ELIQUIS) tablet 5 mg (5 mg Oral Given 11/3/17 0926)   aspirin (ECOTRIN LOW STRENGTH) EC tablet 162 mg (162 mg Oral Given 11/3/17 0926)   carvedilol (COREG) tablet 12 5 mg (12 5 mg Oral Given 11/3/17 0645)   lisinopril (ZESTRIL) tablet 10 mg (10 mg Oral Not Given 11/3/17 0921)   nitroglycerin (NITROSTAT) SL tablet 0 4 mg (not administered)   ondansetron (ZOFRAN) injection 4 mg (not administered)   calcium carbonate (TUMS) chewable tablet 1,000 mg (not administered)   acetaminophen (TYLENOL) tablet 650 mg (not administered)   traMADol (ULTRAM) tablet 50 mg (50 mg Oral Given 11/2/17 2009)   morphine injection 2 mg (2 mg Intravenous Given 11/3/17 0926)   insulin lispro (HumaLOG) 100 units/mL subcutaneous injection 1-6 Units (2 Units Subcutaneous Given 11/3/17 0646)   insulin lispro (HumaLOG) 100 units/mL subcutaneous injection 1-5 Units (2 Units Subcutaneous Given 11/2/17 2104)   insulin glargine (LANTUS) subcutaneous injection 10 Units (10 Units Subcutaneous Given 11/2/17 2106)   morphine (PF) 10 mg/mL injection 6 mg (6 mg Intravenous Given 11/2/17 1239)   albuterol inhalation solution 5 mg (5 mg Nebulization Given 11/2/17 1240)   ipratropium (ATROVENT) 0 02 % inhalation solution 0 5 mg (0 5 mg Nebulization Given 11/2/17 1240)   morphine (PF) 10 mg/mL injection 6 mg (6 mg Intravenous Given 11/2/17 1540)   iohexol (OMNIPAQUE) 350 MG/ML injection (MULTI-DOSE) 85 mL (85 mL Intravenous Given 11/2/17 1615)   sodium chloride 0 9 % bolus 1,000 mL (0 mL Intravenous Stopped 11/2/17 1953)   sodium chloride 0 9 % infusion (50 mL/hr Intravenous New Bag 11/2/17 1954)   sodium chloride 0 9 % infusion (75 mL/hr Intravenous New Bag 11/3/17 0926)     CTA ED chest PE study   ED Interpretation   CT ordered by myself and the report from radiologist has been reviewed  Final Result      No evidence of filling defect identified in the main pulmonary artery, pulmonary trunk, or lobar pulmonary arteries  Segmental and subsegmental branches are inadequately evaluated due to inadequate contrast timing bolus where I cannot exclude a embolus  Enlarged left ventricle  Hepatic steatosis  Workstation performed: [id number]         X-ray chest 2 views   ED Interpretation   The CXR was ordered and interpreted by me independently  On my read, it appears normal without acute abnormalities  Final Result      No active pulmonary disease  Workstation performed: [id number]           Orders Placed This Encounter   Procedures    X-ray chest 2 views    CTA ED chest PE study    Comprehensive metabolic panel    CBC and differential    Protime-INR    APTT    BNP    D-dimer, quantitative    TSH, 3rd generation    Basic metabolic panel    Magnesium    CBC (With Platelets)    Phosphorus    Troponin I    Hemoglobin A1c (Orders if not completed within the last 90 days)    Lipid panel    Diet [place]/CHO Controlled; Consistent Carbohydrate Diet Level 2 (5 carb servings/75 grams CHO/meal);  Cardiac TLC 2 3 GM NA    POCT troponin    Nursing communcation Continue IV as ordered   Notify admitting physician    Notify admitting physician on arrival    Activity as tolerated    Bathroom privileges    Vital Signs per unit routine    Telemetry monitoring    Pulse Oximetry,Spot    Notify physician (specify)    Up and OOB as tolerated    Daily weights    I/O    Maintain IV access    Incentive spirometry    Nasal cannula oxygen    Place sequential compression device    Insulin Subcutaneous Notify Physician    Insulin Subcutaneous Instruction    Fingerstick Glucose (POCT) Before meals and at bedtime    Fingerstick Glucose (POCT)    Level 1-Full Code: all life saving measures are indicated    Inpatient consult to Cardiology    ECG 12 lead    Insert peripheral IV    Place in Observation (expected length of stay for this patient is less than two midnights)     Labs Reviewed   COMPREHENSIVE METABOLIC PANEL - Abnormal        Result Value Ref Range Status    Glucose 219 (*) 65 - 140 mg/dL Final    Comment:   If the patient is fasting, the ADA then defines impaired fasting glucose as > 100 mg/dL and diabetes as > or equal to 123 mg/dL  Specimen collection should occur prior to Sulfasalazine administration due to the potential for falsely depressed results  Specimen collection should occur prior to Sulfapyridine administration due to the potential for falsely elevated results      Albumin 3 4 (*) 3 5 - 5 0 g/dL Final    Total Bilirubin 1 34 (*) 0 20 - 1 00 mg/dL Final    Sodium 136  136 - 145 mmol/L Final    Potassium 4 1  3 5 - 5 3 mmol/L Final    Chloride 103  100 - 108 mmol/L Final    CO2 25  21 - 32 mmol/L Final    Anion Gap 8  4 - 13 mmol/L Final    BUN 15  5 - 25 mg/dL Final    Creatinine 1 00  0 60 - 1 30 mg/dL Final    Comment: Standardized to IDMS reference method    Calcium 9 6  8 3 - 10 1 mg/dL Final    AST 14  5 - 45 U/L Final    Comment:   Specimen collection should occur prior to Sulfasalazine administration due to the potential for falsely depressed results  ALT 27  12 - 78 U/L Final    Comment:   Specimen collection should occur prior to Sulfasalazine and/or Sulfapyridine administration due to the potential for falsely depressed results  Alkaline Phosphatase 69  46 - 116 U/L Final    Total Protein 7 8  6 4 - 8 2 g/dL Final    eGFR 79  ml/min/1 73sq m Final    Narrative:     National Kidney Disease Education Program recommendations are as follows:  GFR calculation is accurate only with a steady state creatinine  Chronic Kidney disease less than 60 ml/min/1 73 sq  meters  Kidney failure less than 15 ml/min/1 73 sq  meters  CBC AND DIFFERENTIAL - Abnormal     MCV 71 (*) 82 - 98 fL Final    MCH 24 3 (*) 26 8 - 34 3 pg Final    Neutrophils Relative 76 (*) 43 - 75 % Final    WBC 5 77  4 31 - 10 16 Thousand/uL Final    RBC 4 98  3 81 - 5 12 Million/uL Final    Hemoglobin 12 1  11 5 - 15 4 g/dL Final    Hematocrit 35 1  34 8 - 46 1 % Final    MCHC 34 5  31 4 - 37 4 g/dL Final    RDW 13 7  11 6 - 15 1 % Final    MPV 10 6  8 9 - 12 7 fL Final    Platelets 064  148 - 390 Thousands/uL Final    nRBC 0  /100 WBCs Final    Lymphocytes Relative 19  14 - 44 % Final    Monocytes Relative 4  4 - 12 % Final    Eosinophils Relative 1  0 - 6 % Final    Basophils Relative 0  0 - 1 % Final    Neutrophils Absolute 4 40  1 85 - 7 62 Thousands/µL Final    Lymphocytes Absolute 1 09  0 60 - 4 47 Thousands/µL Final    Monocytes Absolute 0 23  0 17 - 1 22 Thousand/µL Final    Eosinophils Absolute 0 03  0 00 - 0 61 Thousand/µL Final    Basophils Absolute 0 01  0 00 - 0 10 Thousands/µL Final   NT-BNP PRO (BRAIN NATRIURETIC PEPTIDE) - Abnormal     NT-proBNP 598 (*) <125 pg/mL Final   D-DIMER, QUANTITATIVE - Abnormal     D-Dimer, Quant 674 (*) 0 - 424 ng/ml (FEU) Final    Comment:   Reference and upper limits to exclude DVT and PE are the same  Do not use to exclude if clinical symptoms are present        Pregnant women:  1st trimester:  <220 - 1060 ng/ml (FEU)  2nd trimester:  <220 - 1880 ng/ml (FEU)  3rd trimester:   238 - 3280 ng/ml (FEU)         PROTIME-INR - Normal    Protime 13 1  12 1 - 14 4 seconds Final    INR 0 99  0 86 - 1 16 Final   APTT - Normal    PTT 26  23 - 35 seconds Final    Narrative: Therapeutic Heparin Range = 60-90 seconds   POCT TROPONIN - Normal    POC Troponin I 0 00  0 00 - 0 08 ng/ml Final    Specimen Type VENOUS   Final    Narrative:     [employer] i-Stat handheld analyzer 99% cutoff is > 0 08ng/mL in network Emergency Departments    o cTnI 99% cutoff is useful only when applied to patients in the clinical setting of myocardial ischemia  o cTnI 99% cutoff should be interpreted in the context of clinical history, ECG findings and possibly cardiac imaging to establish correct diagnosis  o cTnI 99% cutoff may be suggestive but clearly not indicative of a coronary event without the clinical setting of myocardial ischemia       Time reflects when diagnosis was documented in both MDM as applicable and the Disposition within this note     Time User Action Codes Description Comment    11/2/2017  4:57 PM Caro Bihari Add [R06 00] Dyspnea     11/2/2017  4:57 PM Jessica Mandril [R00 0] Tachycardia     11/2/2017  4:57 PM Jessica Mandril [Z91 14] Noncompliance with medication regimen     11/2/2017  4:57 PM Jessica Mandril [R07 9] Chest pain     11/2/2017  4:57 PM Caro Bihari Modify [R06 00] Dyspnea     11/2/2017  4:57 PM Geannie Bone [R07 9] Chest pain     11/2/2017  6:23 PM Sarah, Alpiniano B Modify [R00 0] Tachycardia     11/2/2017  6:23 PM Sarah, Alpiniano B Modify [R07 9] Chest pain     11/2/2017  6:23 PM Sarah, Alpiniano B Add [I42 9] Cardiomyopathy ([facility] [address] )     11/2/2017  6:23 PM Sarah, Alpiniano B Modify [R00 0] Tachycardia     11/2/2017  6:23 PM Sarah, Alpiniano B Modify [R07 9] Chest pain     11/2/2017  6:23 PM Sarah, Alpiniano B Modify [I42 9] [facility]     11/2/2017  6:23 PM Sarah, Alpiniano B Modify [R00 0] Tachycardia     11/2/2017  6:23 PM Sarah, Alpiniano B Modify [R07 9] Chest pain     11/2/2017  6:23 PM Sarah, Alpiniano B Modify [I42 9] [facility]     11/2/2017  6:23 PM Sraah, Alpiniano B Modify [R00 0] Tachycardia     11/2/2017  6:23 PM Sarah, Alpiniano B Modify [R07 9] Chest pain     11/2/2017  6:23 PM Sarah, Alpiniano B Modify [R00 0] Tachycardia     11/2/2017  6:23 PM Sarah, Alpiniano B Modify [R07 9] Chest pain       ED Disposition     ED Disposition Condition Comment    Admit  Case was discussed with ROYA and the patient's admission status was agreed to be Admission Status: observation status to the service of Dr [facility]   Follow-up Information    None       Current Discharge Medication List      CONTINUE these medications which have NOT CHANGED    Details   apixaban (ELIQUIS) 5 mg Take 5 mg by mouth 2 (two) times a day      aspirin (ECOTRIN LOW STRENGTH) 81 mg EC tablet Take 81 mg by mouth daily      carvedilol (COREG) 12 5 mg tablet Take 12 5 mg by mouth 2 (two) times a day with meals      lisinopril (ZESTRIL) 10 mg tablet Take 10 mg by mouth daily      metFORMIN (GLUCOPHAGE-XR) 500 mg 24 hr tablet Take by mouth 2 (two) times a day with meals      nitroglycerin (NITROSTAT) 0 4 mg SL tablet Place 0 4 mg under the tongue every 5 (five) minutes as needed for chest pain           No discharge procedures on file  Prior to Admission Medications   Prescriptions Last Dose Informant Patient Reported? Taking?    apixaban (ELIQUIS) 5 mg   Yes Yes   Sig: Take 5 mg by mouth 2 (two) times a day   aspirin (ECOTRIN LOW STRENGTH) 81 mg EC tablet   Yes Yes   Sig: Take 81 mg by mouth daily   carvedilol (COREG) 12 5 mg tablet   Yes Yes   Sig: Take 12 5 mg by mouth 2 (two) times a day with meals   lisinopril (ZESTRIL) 10 mg tablet   Yes Yes   Sig: Take 10 mg by mouth daily   metFORMIN (GLUCOPHAGE-XR) 500 mg 24 hr tablet   Yes Yes   Sig: Take by mouth 2 (two) times a day with meals   nitroglycerin (NITROSTAT) 0 4 mg SL tablet   Yes Yes   Sig: Place 0 4 mg under the tongue every 5 (five) minutes as needed for chest pain      Facility-Administered Medications: None       Portions of the record may have been created with voice recognition software  Occasional wrong word or "sound a like" substitutions may have occurred due to the inherent limitations of voice recognition software  Read the chart carefully and recognize, using context, where substitutions have occurred      Electronically signed by:  Channing Alba MD  11/03/17 1002

## 2017-11-03 VITALS
RESPIRATION RATE: 18 BRPM | HEIGHT: 66 IN | HEART RATE: 92 BPM | TEMPERATURE: 98.4 F | OXYGEN SATURATION: 97 % | BODY MASS INDEX: 37.45 KG/M2 | WEIGHT: 233.03 LBS | SYSTOLIC BLOOD PRESSURE: 111 MMHG | DIASTOLIC BLOOD PRESSURE: 72 MMHG

## 2017-11-03 LAB
ANION GAP SERPL CALCULATED.3IONS-SCNC: 8 MMOL/L (ref 4–13)
BUN SERPL-MCNC: 13 MG/DL (ref 5–25)
CALCIUM SERPL-MCNC: 9.1 MG/DL (ref 8.3–10.1)
CHLORIDE SERPL-SCNC: 99 MMOL/L (ref 100–108)
CHOLEST SERPL-MCNC: 229 MG/DL (ref 50–200)
CO2 SERPL-SCNC: 26 MMOL/L (ref 21–32)
CREAT SERPL-MCNC: 0.92 MG/DL (ref 0.6–1.3)
ERYTHROCYTE [DISTWIDTH] IN BLOOD BY AUTOMATED COUNT: 13.7 % (ref 11.6–15.1)
EST. AVERAGE GLUCOSE BLD GHB EST-MCNC: 235 MG/DL
GFR SERPL CREATININE-BSD FRML MDRD: 88 ML/MIN/1.73SQ M
GLUCOSE SERPL-MCNC: 184 MG/DL (ref 65–140)
GLUCOSE SERPL-MCNC: 200 MG/DL (ref 65–140)
GLUCOSE SERPL-MCNC: 211 MG/DL (ref 65–140)
GLUCOSE SERPL-MCNC: 237 MG/DL (ref 65–140)
GLUCOSE SERPL-MCNC: 272 MG/DL (ref 65–140)
HBA1C MFR BLD: 9.8 % (ref 4.2–6.3)
HCT VFR BLD AUTO: 33.9 % (ref 34.8–46.1)
HDLC SERPL-MCNC: 51 MG/DL (ref 40–60)
HGB BLD-MCNC: 11.7 G/DL (ref 11.5–15.4)
LDLC SERPL CALC-MCNC: 102 MG/DL (ref 0–100)
MAGNESIUM SERPL-MCNC: 1.5 MG/DL (ref 1.6–2.6)
MCH RBC QN AUTO: 24.3 PG (ref 26.8–34.3)
MCHC RBC AUTO-ENTMCNC: 34.5 G/DL (ref 31.4–37.4)
MCV RBC AUTO: 71 FL (ref 82–98)
PHOSPHATE SERPL-MCNC: 4.5 MG/DL (ref 2.7–4.5)
PLATELET # BLD AUTO: 229 THOUSANDS/UL (ref 149–390)
PMV BLD AUTO: 9.8 FL (ref 8.9–12.7)
POTASSIUM SERPL-SCNC: 3.7 MMOL/L (ref 3.5–5.3)
RBC # BLD AUTO: 4.81 MILLION/UL (ref 3.81–5.12)
SODIUM SERPL-SCNC: 133 MMOL/L (ref 136–145)
TRIGL SERPL-MCNC: 380 MG/DL
TSH SERPL DL<=0.05 MIU/L-ACNC: 1.01 UIU/ML (ref 0.36–3.74)
WBC # BLD AUTO: 5.07 THOUSAND/UL (ref 4.31–10.16)

## 2017-11-03 PROCEDURE — 80048 BASIC METABOLIC PNL TOTAL CA: CPT | Performed by: INTERNAL MEDICINE

## 2017-11-03 PROCEDURE — 85027 COMPLETE CBC AUTOMATED: CPT | Performed by: INTERNAL MEDICINE

## 2017-11-03 PROCEDURE — 83036 HEMOGLOBIN GLYCOSYLATED A1C: CPT | Performed by: INTERNAL MEDICINE

## 2017-11-03 PROCEDURE — 82948 REAGENT STRIP/BLOOD GLUCOSE: CPT

## 2017-11-03 PROCEDURE — 80061 LIPID PANEL: CPT | Performed by: INTERNAL MEDICINE

## 2017-11-03 PROCEDURE — 84443 ASSAY THYROID STIM HORMONE: CPT | Performed by: INTERNAL MEDICINE

## 2017-11-03 PROCEDURE — 84100 ASSAY OF PHOSPHORUS: CPT | Performed by: INTERNAL MEDICINE

## 2017-11-03 PROCEDURE — 83735 ASSAY OF MAGNESIUM: CPT | Performed by: INTERNAL MEDICINE

## 2017-11-03 RX ORDER — SODIUM CHLORIDE 9 MG/ML
75 INJECTION, SOLUTION INTRAVENOUS ONCE
Status: COMPLETED | OUTPATIENT
Start: 2017-11-03 | End: 2017-11-03

## 2017-11-03 RX ADMIN — INSULIN LISPRO 3 UNITS: 100 INJECTION, SOLUTION INTRAVENOUS; SUBCUTANEOUS at 11:11

## 2017-11-03 RX ADMIN — CARVEDILOL 12.5 MG: 12.5 TABLET, FILM COATED ORAL at 06:45

## 2017-11-03 RX ADMIN — APIXABAN 5 MG: 5 TABLET, FILM COATED ORAL at 09:26

## 2017-11-03 RX ADMIN — ASPIRIN 162 MG: 81 TABLET, COATED ORAL at 09:26

## 2017-11-03 RX ADMIN — MORPHINE SULFATE 2 MG: 2 INJECTION, SOLUTION INTRAMUSCULAR; INTRAVENOUS at 09:26

## 2017-11-03 RX ADMIN — SODIUM CHLORIDE 75 ML/HR: 0.9 INJECTION, SOLUTION INTRAVENOUS at 09:26

## 2017-11-03 RX ADMIN — INSULIN LISPRO 2 UNITS: 100 INJECTION, SOLUTION INTRAVENOUS; SUBCUTANEOUS at 06:46

## 2017-11-03 NOTE — CASE MANAGEMENT
Initial Clinical Review    [address] [facility] in the [facility] by [address] for 2017  Network Utilization Review Department  Phone: [phone]; Fax [phone]  ATTENTION: The Network Utilization Review Department is now centralized for our 7 Facilities  Make a note that we have a new phone and fax numbers for our Department  Please call with any questions or concerns to [phone] and carefully follow the prompts so that you are directed to the right person  All voicemails are confidential  Fax any determinations, approvals, denials, and requests for initial or continue stay review clinical to [phone]  Due to HIGH CALL volume, it would be easier if you could please send faxed requests to expedite your requests and in part, help us provide discharge notifications faster  Admission: Date/Time/Statement: Placed in Observation status on 11/2 @ 16:57    Orders Placed This Encounter   Procedures    Place in Observation (expected length of stay for this patient is less than two midnights)     Standing Status:   Standing     Number of Occurrences:   1     Order Specific Question:   Admitting Physician     Answer:   Pepe Martinez [[id number]]     Order Specific Question:   Level of Care     Answer:   Med Surg [16]         ED: Date/Time/Mode of Arrival:   ED Arrival Information     Expected Arrival Acuity Means of Arrival Escorted By Service Admission Type    - 11/2/2017 12:03 Emergent Ambulance [facility] [address] [facility] Emergency    Arrival Complaint    chest pain          Chief Complaint:   Chief Complaint   Patient presents with    Chest Pain     Dull aching chest pain that started last night  Took 1 nitro with minimal relief  Woke up this morning with some chest pain with radiating into left arm         History of Illness: Jacinda Carrillo is a 38 y o  female who presents with chest pains to the emergency room at [facility]  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  She tells me that she usually gets her care in [address] [facility] and in [facility] in [place] [address]  Patient told me that she has history of postpartum cardiomyopathy  This was diagnosed in 2013 when she experience chest pains and shortness of breath  This was a few months after she gave birth  At that time, her ejection fraction was found to be 30% and eventually it went down to 25%  Thus, at that time, an AICD was placed  Recently, she told me her ejection fraction even went down further to 15%  She told me that she just had a recent echocardiogram a few months ago in [place]  She also told me that in 2011, again after giving birth, patient had chest pains and shortness of breath  At that time, she was found to have coronary artery disease and 3 stents were placed  She told me that she was prescribed with aspirin since that time but she is poorly compliant with this medication  In fact, she has not taken the aspirin for several months now  In 2016, she told me that she was diagnosed with pulmonary embolism  She was prescribed with Eliquis and again patient admitted to me that she is poorly compliant with the Eliquis and that she has been missing doses  She told me that she missed her doses yesterday  Yesterday, she told me, when she went back to her hotel, she started having chest pains localized at the upper midsternal area  Patient told me that it was like a dull ache but at the same time she gesture that it was reproducible by pressing on that area  Patient told me that the pain would radiate to both of her arms but more on the left arm  Patient told me that she felt cold and clammy  Patient took her nitroglycerin last night  She was able to sleep but when she awakened, patient again experienced the chest pains    She was brought by EMS and she told me that the EMS staff told her that she was cold and clammy when she was picked up  Patient also had some shortness of breath today  Patient was given doses of albuterol and Atrovent nebulizations in the emergency room  In the emergency room, patient was found to be tachycardic in sinus rhythm  Patient also told me that for the past 3 days, she has been having diarrhea  This was associated with nausea but no actual vomiting episodes  She said the diarrhea had resolved today  Due to the diarrhea and nausea, she has been having poor oral intake/poor appetite this past 3 days also  But today, she admits that she was able to eat  Patient denies any abdominal pains  Patient denies any fever chills  Patient denies any cough or colds  Presently, feels that she has a headache  Patient denies any other symptoms other the ones mentioned above      ED Vital Signs:   ED Triage Vitals   Temperature Pulse Respirations Blood Pressure SpO2   11/02/17 1207 11/02/17 1207 11/02/17 1207 11/02/17 1207 11/02/17 1207   99 4 °F (37 4 °C) (!) 112 18 132/56 98 % RA sat       Temp Source Heart Rate Source Patient Position - Orthostatic VS BP Location FiO2 (%)   11/02/17 1207 11/02/17 1207 11/02/17 1207 11/02/17 1207 --   Oral Monitor Lying Right arm       Pain Score       11/02/17 1238       6        Wt Readings from Last 1 Encounters:   11/03/17 106 kg (233 lb 0 4 oz)       Vital Signs (abnormal):  HR sustained 123-107 - T max 100    Abnormal Labs/Diagnostic Test Results:   Ref Range & Units 11/3/17 0444 11/2/17 1216   WBC 4 31 - 10 16 Thousand/uL 5 07  5 77    RBC 3 81 - 5 12 Million/uL 4 81  4 98    Hemoglobin 11 5 - 15 4 g/dL 11 7  12 1    Hematocrit 34 8 - 46 1 % 33 9   35 1    MCV 82 - 98 fL 71   71     MCH 26 8 - 34 3 pg 24 3   24 3     MCHC 31 4 - 37 4 g/dL 34 5  34 5    RDW 11 6 - 15 1 % 13 7  13 7    Platelets 303 - 015 Thousands/uL 229  246    MPV 8 9 - 12 7 fL 9 8  10 6             Ref Range & Units 11/3/17 0444 11/2/17 1216 Sodium 136 - 145 mmol/L 133   136    Potassium 3 5 - 5 3 mmol/L 3 7  4 1    Chloride 100 - 108 mmol/L 99   103    CO2 21 - 32 mmol/L 26  25    Anion Gap 4 - 13 mmol/L 8  8    BUN 5 - 25 mg/dL 13  15    Creatinine 0 60 - 1 30 mg/dL 0 92  1 00CM    Glucose 65 - 140 mg/dL 184   219CM     Calcium 8 3 - 10 1 mg/dL 9 1  9 6    eGFR ml/min/1 73sq m 88  79            MAG - 1 5  Troponin 0 02-0 02  D Dimer 674    Hg A1C - 9 8/235     Ref Range & Units 11/3/17 0444 Flag   Cholesterol 50 - 200 mg/dL 229   H    Triglycerides <=150 mg/dL 380   H    HDL, Direct 40 - 60 mg/dL 51     LDL Calculated 0 - 100 mg/dL 102   H            CXR - no acute    CTA Chest  - No evidence of filling defect identified in the main pulmonary artery, pulmonary trunk, or lobar pulmonary arteries  Segmental and subsegmental branches are inadequately evaluated due to inadequate contrast timing bolus where I cannot exclude a embolus  Enlarged left ventricle  Hepatic steatosis        ED Treatment:   Medication Administration from 11/02/2017 1202 to 11/02/2017 2020       Date/Time Order Dose Route Action Action by Comments     11/02/2017 1239 morphine (PF) 10 mg/mL injection 6 mg 6 mg Intravenous Given Stan Jackosn RN      11/02/2017 1240 albuterol inhalation solution 5 mg 5 mg Nebulization Given Jb Samaniego RN      11/02/2017 1240 ipratropium (ATROVENT) 0 02 % inhalation solution 0 5 mg 0 5 mg Nebulization Given Jb Samaniego RN      11/02/2017 1540 morphine (PF) 10 mg/mL injection 6 mg 6 mg Intravenous Given Briseyda Guerrero RN      11/02/2017 1615 iohexol (OMNIPAQUE) 350 MG/ML injection (MULTI-DOSE) 85 mL 85 mL Intravenous Given Deidra Kulkarni      11/02/2017 1726 sodium chloride 0 9 % bolus 1,000 mL 1,000 mL Intravenous New Bag Briseyda Guerrero RN      11/02/2017 2007 apixaban (ELIQUIS) tablet 5 mg 5 mg Oral Given Briseyda Guerrero RN      11/02/2017 1954 sodium chloride 0 9 % infusion 50 mL/hr Intravenous New Bag Remington Fuentes RN      11/02/2017 2009 traMADol (ULTRAM) tablet 50 mg 50 mg Oral Given Alma Membreno RN           Past Medical/Surgical History:     Past Medical History:   Diagnosis Date    Cardiac disease     Diabetes mellitus ([facility] )     Hypertension     Pulmonary embolism ([facility] )        Admitting Diagnosis: Chest pain [R07 9]  Cardiomyopathy ([facility] ) [I42 9]  Dyspnea [R06 00]  Tachycardia [R00 0]  Noncompliance with medication regimen [Z91 14]    Age/Sex: 38 y o  female    Assessment/Plan:   1  Chest pain, rule out acute coronary syndrome; with history of coronary artery disease with 3 stents that was placed in 2011; with 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%; history of pulmonary embolism with poor compliance with anticoagulation (Eliquis): Admit the patient to our medical-surgical telemetry floor under observation at this point  Pain control  Nitroglycerin as needed  Oxygen as needed  Cardiology consult  Cardiovascular medications  Aspirin  Patient is also poorly compliant with aspirin  Thus the importance of being compliant with medications were stressed to the patient  Continue Eliquis  Trend patient's troponins  May need to get records and echocardiogram results at [facility] versus ordered for an echocardiogram here  According to the patient, she had an echocardiogram this year  We will ask her cardiologist about this  At this point, there is no evidence of pulmonary embolism in the main pulmonary artery, pulmonary trunk or lobar pulmonary arteries  However, we cannot totally rule out possibility of segmental and subsegmental pulmonary embolism most specially patient has been poorly compliant with her Eliquis       2  Tachycardia, likely multifactorial:  Patient told me that she has history of chronic tachycardia but it is usually in the low 100s    Patient's tachycardia right now may also be due to the fact that patient was given beta agonist and Atrovent nebulizations in the emergency room  This may be also due to some dehydration as patient had bouts of diarrhea and poor oral intake in the last 3 days  At this point in time we cannot totally rule out possibility of recurrent segmental and subsegmental pulmonary embolism given the patient is poorly compliant with anticoagulation:  I will give 1 L of gentle rate IV fluids for now and observe patient's heart rate  Continue Eliquis  Continue beta blocker      3   Recent diarrhea and poor oral intake, possibly due to acute viral gastroenteritis:  For further workup and management if diarrhea recurs  IV fluids      4   Diabetes mellitus type 2 with hyperglycemia:  Hold metformin  Check A1c levels  Lantus and insulin sliding scale  Adjust accordingly      5  Hypertension:  Continue blood pressure medications      6  Incidental finding of hepatic steatosis:  Outpatient follow with primary care physician      Admission Orders:  Scheduled Meds:   apixaban 5 mg Oral BID   aspirin 162 mg Oral Daily   carvedilol 12 5 mg Oral BID With Meals   insulin glargine 10 Units Subcutaneous HS   insulin lispro 1-5 Units Subcutaneous HS   insulin lispro 1-6 Units Subcutaneous TID AC   lisinopril 10 mg Oral Daily     Continuous Infusions:    PRN Meds:   acetaminophen    calcium carbonate    morphine injection 2 mg iv 11/2 x 1     nitroglycerin    ondansetron    traMADol 50 mg po 11/2 x 1    Nursing Orders - Telem - VS q 4 - up and OOB as tolerated - BRP - Daily weights - I & O q shift - Incentive Spirometry - Diet cons carb  2 3 gm NA       Consult Cardiology   - pending

## 2017-11-03 NOTE — PROGRESS NOTES
[address] [facility] Internal Medicine Progress Note  Patient: Josiane Manrique 38 y o  female   MRN: [id number]  PCP: Mayco Mike  Unit/Bed#: CW2 214-02 Encounter: [id number]  Date Of Visit: 11/03/17    Assessment:    Principal Problem:    Chest pain  Active Problems:    Cardiomyopathy [facility])    Essential hypertension    Type 2 diabetes mellitus ([address] )    Pulmonary emboli (HCC)    Tachycardia    Diarrhea    CAD (coronary artery disease)      Plan:    · Atypical chest pain: no EKG changes  Neg trops  Outpatient stress test  Will ask office staff to arrange  · Near syncope / lightheadedness: likely due to orthostasis and low BPs  · Diarrhea with poor oral intake: resolved  C/w IVF  · History of postpartum cardiomyopathy s/p ICD implant: coreg, lisinopril, not on lasix  Outside records unatainnable at this time  · DM2 with hyperglycemia: HA1C 9 8%  Continue home regimen of insulin  · History of PE: encouraged compliance with eliquis  CTA chest PE study with no evidence of filling defect in main pulm artery, pulm trunk or lobar pulm arteries, unable to r/o segmental and subsegmental branches  Already on eliquis  · HTN: c/w home medications  No on diuretic regimen  · CAD: s/p 3 stents in 2011  Medical management  · HLD:  [address] HDL 51   Recommend lifestyle modifications and statin medication  · Tachycardia: chronic usually in low 100s, was gien beta agonists in ER  also consider due to dehydration, improved with IVF hydration  · Incidental finding of hepatic steatosis: f/u with PCP      VTE Pharmacologic Prophylaxis:   Pharmacologic: Apixaban (Eliquis)  Mechanical VTE Prophylaxis in Place: Yes    Patient Centered Rounds: I have performed bedside rounds with nursing staff today  Discussions with Specialists or Other Care Team Provider: nursing, appreciate cards input     Education and Discussions with Family / Patient: patient     Time Spent for Care: 30 minutes    More than 50% of total time spent on counseling and coordination of care as described above  Current Length of Stay: 0 day(s)    Current Patient Status: Observation   Certification Statement: The patient will continue to require additional inpatient hospital stay due to IVF hydration in a patient with near syncope and history of ICD placement     Discharge Plan / Estimated Discharge Date: likely within next 24 hours     Code Status: Level 1 - Full Code      Subjective:   Patient seen and examined at bedside  Patient has episode of lightheadedness this morning  States she felt like she was going to pass out  Feels better after IVF hydration  No chest pain or SOB now  No palpitations  No n/v/d or abdominal pain  Give verbal consent to obtain records from outside hospitals  Objective:     Vitals:   Temp (24hrs), Av 8 °F (37 1 °C), Min:97 9 °F (36 6 °C), Max:100 °F (37 8 °C)    HR:  [] 94  Resp:  [18-29] 20  BP: (101-143)/(55-87) 104/55  SpO2:  [96 %-100 %] 96 %  Body mass index is 37 61 kg/m²  Input and Output Summary (last 24 hours): Intake/Output Summary (Last 24 hours) at 17 1210  Last data filed at 17 0749   Gross per 24 hour   Intake              240 ml   Output                0 ml   Net              240 ml       Physical Exam:     Physical Exam   Constitutional: She is oriented to person, place, and time  She appears well-developed and well-nourished  No distress  HENT:   Head: Normocephalic and atraumatic  Mouth/Throat: Oropharynx is clear and moist    Eyes: EOM are normal  No scleral icterus  Neck: Normal range of motion  Neck supple  Cardiovascular: Regular rhythm and normal heart sounds  No murmur heard  Slightly tachycardic rate    Pulmonary/Chest: Effort normal and breath sounds normal    Abdominal: Soft  Bowel sounds are normal    Obese    Musculoskeletal: Normal range of motion  She exhibits no edema  Neurological: She is alert and oriented to person, place, and time     Skin: Skin is warm and dry  Psychiatric: She has a normal mood and affect  Her behavior is normal    Nursing note and vitals reviewed  Additional Data:     Labs:      Results from last 7 days  Lab Units 11/03/17  0444 11/02/17  1216   WBC Thousand/uL 5 07 5 77   HEMOGLOBIN g/dL 11 7 12 1   HEMATOCRIT % 33 9* 35 1   PLATELETS Thousands/uL 229 246   NEUTROS PCT %  --  76*   LYMPHS PCT %  --  19   MONOS PCT %  --  4   EOS PCT %  --  1       Results from last 7 days  Lab Units 11/03/17  0444 11/02/17  1216   SODIUM mmol/L 133* 136   POTASSIUM mmol/L 3 7 4 1   CHLORIDE mmol/L 99* 103   CO2 mmol/L 26 25   BUN mg/dL 13 15   CREATININE mg/dL 0 92 1 00   CALCIUM mg/dL 9 1 9 6   TOTAL PROTEIN g/dL  --  7 8   BILIRUBIN TOTAL mg/dL  --  1 34*   ALK PHOS U/L  --  69   ALT U/L  --  27   AST U/L  --  14   GLUCOSE RANDOM mg/dL 184* 219*       Results from last 7 days  Lab Units 11/02/17  1216   INR  0 99       * I Have Reviewed All Lab Data Listed Above  * Additional Pertinent Lab Tests Reviewed: [address] Admission Reviewed    Imaging:    Imaging Reports Reviewed Today Include: cxr cta chest   Imaging Personally Reviewed by Myself Includes:  none    Recent Cultures (last 7 days):           Last 24 Hours Medication List:     apixaban 5 mg Oral BID   aspirin 162 mg Oral Daily   carvedilol 12 5 mg Oral BID With Meals   insulin glargine 10 Units Subcutaneous HS   insulin lispro 1-5 Units Subcutaneous HS   insulin lispro 1-6 Units Subcutaneous TID AC   lisinopril 10 mg Oral Daily        Today, Patient Was Seen By: Alvin Godinez PA-C    ** Please Note: This note has been constructed using a voice recognition system   **

## 2017-11-03 NOTE — DISCHARGE INSTRUCTIONS
THIS SERVES AS AN ORDER FOR A STRESS TEST (EXERCISE TREADMILL WITH ECHO) TO BE PERFORMED AS OUTPATIENT TO EVALUATE FOR CHEST PAIN AND ACUTE CORONARY SYNDROME  CARDIOLOGY WILL CALL YOU WITH INSTRUCTION RELATING TO THE TEST  Your stress test is scheduled for 8:00 a m  on Monday 11/6/17  Please arrive at 7:40 a m  to fill out the appropriate paperwork   -no caffeine prior to stress test  -may have toast and juice prior for breakfast  -please wear comfortable shoes and clothing  -please bring appropriate photo ID  -please present to [facility] and take elevators to 1st floor to Cardiology Department outside the elevators  Please f/u with PCP within 2-4 weeks  Please seek medical attention immediately if you experience chest pain, palpitations, fever greater than 101 5 deg F, shortenss of breath, unilateral weakness, slurred speech, syncope/passing out       Chest Pain   WHAT YOU NEED TO KNOW:   Chest pain can be caused by a range of conditions, from not serious to life-threatening  Chest pain can be a symptom of a digestive problem, such as acid reflux or a stomach ulcer  An anxiety attack or a strong emotion, such as anger, can also cause chest pain  Infection, inflammation, or a fracture in the bones or cartilage in your chest can cause pain or discomfort  Sometimes chest pain or pressure is caused by poor blood flow to your heart (angina)  Chest pain may also be caused by life-threatening conditions such as a heart attack or blood clot in your lungs  DISCHARGE INSTRUCTIONS:   Call 911 if:   · You have any of the following signs of a heart attack:    ? Squeezing, pressure, or pain in your chest that lasts longer than 5 minutes or returns   ? Discomfort or pain in your back, neck, jaw, stomach, or arm    ? Trouble breathing   ? Nausea or vomiting   ?  Lightheadedness or a sudden cold sweat, especially with chest pain or trouble breathing     Seek care immediately if:   · You have chest discomfort that gets worse, even with medicine  · You cough or vomit blood  · Your bowel movements are black or bloody  · You cannot stop vomiting, or it hurts to swallow  Contact your healthcare provider if:   · You have questions or concerns about your condition or care  Medicines:   · Medicines may be given to treat the cause of your chest pain  Examples include pain medicine, anxiety medicine, or medicines to increase blood flow to your heart  · Do not take certain medicines without asking your healthcare provider first  These include NSAIDs, herbal or vitamin supplements, or hormones (estrogen or progestin)  · Take your medicine as directed  Contact your healthcare provider if you think your medicine is not helping or if you have side effects  Tell him or her if you are allergic to any medicine  Keep a list of the medicines, vitamins, and herbs you take  Include the amounts, and when and why you take them  Bring the list or the pill bottles to follow-up visits  Carry your medicine list with you in case of an emergency  Follow up with your healthcare provider within 72 hours, or as directed: You may need to return for more tests to find the cause of your chest pain  You may be referred to a specialist, such as a cardiologist or gastroenterologist  Write down your questions so you remember to ask them during your visits  Healthy living tips: The following are general healthy guidelines  If your chest pain is caused by a heart problem, your healthcare provider will give you specific guidelines to follow  · Do not smoke  Nicotine and other chemicals in cigarettes and cigars can cause lung and heart damage  Ask your healthcare provider for information if you currently smoke and need help to quit  E-cigarettes or smokeless tobacco still contain nicotine  Talk to your healthcare provider before you use these products  · Eat a variety of healthy, low-fat foods   Healthy foods include fruits, vegetables, whole-grain breads, low-fat dairy products, beans, lean meats, and fish  Ask for more information about a heart healthy diet  · Ask about activity  Your healthcare provider will tell you which activities to limit or avoid  Ask when you can drive, return to work, and have sex  Ask about the best exercise plan for you  · Maintain a healthy weight  Ask your healthcare provider how much you should weigh  Ask him or her to help you create a weight loss plan if you are overweight  © 2017 [address] [employer] Information is for End User's use only and may not be sold, redistributed or otherwise used for commercial purposes  All illustrations and images included in CareNotes® are the copyrighted property of A D A M [employer] Inc  or Stephon Prince  The above information is an  only  It is not intended as medical advice for individual conditions or treatments  Talk to your doctor, nurse or pharmacist before following any medical regimen to see if it is safe and effective for you        Lightheadedness   WHAT YOU NEED TO KNOW:   Lightheadedness is the feeling that you may faint, but you do not  Your heartbeat may be fast or feel like it flutters  Lightheadedness may occur when you take certain medicines, such as medicine to lower your blood pressure  Dehydration, low sodium, low blood sugar, an abnormal heart rhythm, and anxiety are other common causes  DISCHARGE INSTRUCTIONS:   Return to the emergency department if:   · You have sudden chest pain  · You have trouble breathing or shortness of breath  · You have vision changes, are sweating, and have nausea while you are sitting or lying down  · You feel flushed and your heart is fluttering  · You faint  Contact your healthcare provider if:   · You feel lightheaded often  · Your heart beats faster or slower than usual     · You have questions or concerns about your condition or care    Follow up with your healthcare provider as directed: You may need more tests to help find the cause of your lightheadedness  The tests will help healthcare providers plan the best treatment for you  Write down your questions so you remember to ask them during your visits  Self-care: Talk with your healthcare provider about these and other ways to manage your symptoms:  · Lie down when you feel lightheaded, your throat gets tight, or your vision changes  Raise your legs above the level of your heart  · Stand up slowly  Sit on the side of the bed or couch for a few minutes before you stand up  · Take slow, deep breaths when you feel lightheaded  This can help decrease the feeling that you might faint  · Ask if you need to avoid hot baths and saunas  These may make your symptoms worse  Watch for signs of low blood sugar: These include hunger, nervousness, sweating, and fast or fluttery heartbeats  Talk with your healthcare provider about ways to keep your blood sugar level steady  Check your blood pressure often: You should do this especially if you take medicine to lower your blood pressure  Check your blood pressure when you are lying down and when you are standing  Ask how often to check during the day  Keep a record of your blood pressure numbers  Your healthcare provider may use the record to help plan your treatment  Keep a record of your lightheadedness episodes: Include your symptoms and your activity before and after the episode  The record can help your healthcare provider find the cause of your lightheadedness and help you manage episodes  © 2017 [address] [employer] Information is for End User's use only and may not be sold, redistributed or otherwise used for commercial purposes  All illustrations and images included in CareNotes® are the copyrighted property of [employer]  or Stephon Prince  The above information is an  only   It is not intended as medical advice for individual conditions or treatments  Talk to your doctor, nurse or pharmacist before following any medical regimen to see if it is safe and effective for you      Near Syncope   WHAT YOU NEED TO KNOW:   Near syncope, also called presyncope, is the feeling that you may faint (lose consciousness), but you do not  You can control some health conditions that cause near syncope  Your healthcare providers can help you create a plan to manage near syncope and prevent episodes  DISCHARGE INSTRUCTIONS:   Seek care immediately if:   · You have sudden chest pain  · You have trouble breathing or shortness of breath  · You have vision changes, are sweating, and have nausea while you are sitting or lying down  · You feel dizzy or flushed and your heart is fluttering  · You lose consciousness  · You cannot use your arm, hand, foot, or leg, or it feels weak  · You have trouble speaking or understanding others when they speak  Contact your healthcare provider if:   · You have new or worsening symptoms  · Your heart beats faster or slower than usual     · You have questions or concerns about your condition or care  Follow up with your healthcare provider as directed: You may need more tests to help find the cause of your near syncope episodes  The tests will help healthcare providers plan the best treatment for you  Write down your questions so you remember to ask them during your visits  Manage near syncope:   · Sit or lie down when needed  This includes when you feel dizzy, your throat is getting tight, and your vision changes  Raise your legs above the level of your heart  · Take slow, deep breaths if you start to breathe faster with anxiety or fear  This can help decrease dizziness and the feeling that you might faint  · Keep a record of your near syncope episodes  Include your symptoms and your activity before and after the episode   The record can help your healthcare provider find the cause of your near syncope and help you manage episodes  Prevent a near syncope episode:   · Move slowly and let yourself get used to one position before you move to another position  This is very important when you change from a lying or sitting position to a standing position  Take some deep breaths before you stand up from a lying position  Stand up slowly  Sudden movements may cause a fainting spell  Sit on the side of the bed or couch for a few minutes before you stand up  If you are on bedrest, try to be upright for about 2 hours each day, or as directed  Do not lock your legs if you are standing for a long period of time  Move your legs and bend your knees to keep blood flowing  · Follow your healthcare provider's recommendations  Your provider may recommend that you drink more liquids to prevent dehydration  You may also need to have more salt to keep your blood pressure from dropping too low and causing syncope  Your provider will tell you how much liquid and sodium to have each day  · Watch for signs of low blood sugar  These include hunger, nervousness, sweating, and fast or fluttery heartbeats  Talk with your healthcare provider about ways to keep your blood sugar level steady  · Check your blood pressure often  This is important if you take medicine to lower your blood pressure  Check your blood pressure when you are lying down and when you are standing  Ask how often to check during the day  Keep a record of your blood pressure numbers  Your healthcare provider may use the record to help plan your treatment  · Do not strain if you are constipated  You may faint if you strain to have a bowel movement  Walking is the best way to get your bowels moving  Eat foods high in fiber to make it easier to have a bowel movement  Good examples are high-fiber cereals, beans, vegetables, and whole-grain breads  Prune juice may help make bowel movements softer  · Do not exercise outside on a hot day   The combination of physical activity and heat can lead to dehydration  This can cause syncope  © 2017 [address] [employer] Information is for End User's use only and may not be sold, redistributed or otherwise used for commercial purposes  All illustrations and images included in CareNotes® are the copyrighted property of A D A M [employer] Inc  or Stephon Prince  The above information is an  only  It is not intended as medical advice for individual conditions or treatments   Talk to your doctor, nurse or pharmacist before following any medical regimen to see if it is safe and effective for you

## 2017-11-03 NOTE — SOCIAL WORK
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 in [facility]  No POA  Pt denies hx/admission drug/etoh and psych/mental health  CM reviewed d/c planning process including the following: identifying help at home, patient preference for d/c planning needs, Discharge Lounge, [facility] Meds to Bed program, availability of treatment team to discuss questions or concerns patient and/or family may have regarding understanding medications and recognizing signs and symptoms once discharged  CM also encouraged patient to follow up with all recommended appointments after discharge  Patient advised of importance for patient and family to participate in managing patients medical well being

## 2017-11-03 NOTE — DISCHARGE SUMMARY
Discharge Summary - [facility] Internal Medicine    Patient Information: Herberth Zaldivar 38 y o  female MRN: [id number]  Unit/Bed#: CW2 214-02 Encounter: [id number]    Discharging Physician / Practitioner: Flores Kaba PA-C  PCP: Jimmy Ashford  Admission Date: 11/2/2017  Discharge Date: 11/03/17    Reason for Admission: chest pain     Discharge Diagnoses:     Principal Problem:    Chest pain  Active Problems:    Cardiomyopathy [facility])    Essential hypertension    Type 2 diabetes mellitus ([facility] [address] )    Pulmonary emboli (HCC)    Tachycardia    Diarrhea    CAD (coronary artery disease)  Resolved Problems:    * No resolved hospital problems  *      Consultations During Hospital Stay:  · Cardiology Dr Aura Armstrong     Procedures Performed:     · none    Significant Findings / Test Results:     · Chest pain, resolved   · Negative troponins x 2   · Near syncope / lightheadedness   · Dehydration   · Medication compliance  · Chest x-ray-no active pulmonary disease  · CTA chest PE study-No evidence of filling defect identified in the main pulmonary artery, pulmonary trunk, or lobar pulmonary arteries  Segmental and subsegmental branches are inadequately evaluated due to inadequate contrast timing bolus where I cannot exclude a embolus  Enlarged left ventricle  Hepatic steatosis  · Sinus tachycardia, chronic    Incidental Findings:   · none     Test Results Pending at Discharge (will require follow up):    · None      Outpatient Tests Requested:  · Outpatient stress-echo ordered verbally--discussed this with office staff who will be contact heart service for scheduling     Complications:  None     Hospital Course:     Herberth Zaldivar is a 38 y o  female patient with past medical history significant for type 2 diabetes mellitus with hyperglycemia, 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, hypertension, hepatic steatosis, history of pulmonary embolism non compliant with Eliquis who originally presented to the hospital on 11/2/2017 due to chest pain  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  Patient lives in the [place] and usually follows physicians at [facility] and in [facility] in [address]  She has prior history of coronary artery disease requiring 3 stents in 2011  The day prior to admission the patient was having chest pain localized at the upper midsternal area  Pain yesterday was a dull ache and often would radiate to both of her arms and she felt cold and Clammy  She took her nitroglycerin and was able to sleep but the chest pain persisted again in the morning  Prior to discharge patient's chest pain resolved  She was seen in consultation by Cardiology  Troponins were negative and there were no new EKG changes  Patient got up to use the bathroom and felt lightheaded  This improved with IV fluid hydration  Patient admitted to diarrhea for 3 days prior to admission the likely cause of patient's hypovolemia and dehydration  Prior to discharge patient's symptoms improved markedly  Patient was scheduled for outpatient stress test which is scheduled for Monday  Patient expressed understanding and agrees with plan for discharge home with close outpatient follow up  Condition at Discharge: stable     Discharge Day Visit / Exam:     * Please refer to separate progress note for these details *    Discharge instructions/Information to patient and family:   See after visit summary for information provided to patient and family  Provisions for Follow-Up Care:  See after visit summary for information related to follow-up care and any pertinent home health orders  Disposition:     Home    For Discharges to [facility] SNF:   · Not Applicable to this Patient - Not Applicable to this Patient    Planned Readmission: none    Discharge Statement:  I spent 35 minutes discharging the patient   This time was spent on the day of discharge  I had direct contact with the patient on the day of discharge  Greater than 50% of the total time was spent examining patient, answering all patient questions, arranging and discussing plan of care with patient as well as directly providing post-discharge instructions  Additional time then spent on discharge activities  Discharge Medications:  See after visit summary for reconciled discharge medications provided to patient and family  ** Please Note: This note has been constructed using a voice recognition system   **

## 2017-11-03 NOTE — CONSULTS
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      Plan:    She had an episode of lightheadedness in the bathroom with relative hypotension  We will give her IV fluids today  Cardiomyopathy: Restart Medical therapy once she is more hydrated and BP improves  She does not have a diuretic requirement  Chest Pain: Atypical symptoms  No EKG changes and negative troponin  I think outpatient stress testing is reasonable after she is feeling better  History of Present Illness   Physician Requesting Consult: Shay Whipple MD  Reason for Consult / Principal Problem: chest pain  HPI: Angie Aj is a 38y o  year old female who presents with two days of intermittent chest discomfort  She states it has been in her upper right chest and across over to her left shoulder  It has been waxing and waning  No clear exertional chest discomfort  Overall she has not been feeling well and has had intermittent dizziness  She does have history of pulmonary embolism in 2016 and history of Post Partum Cardiomyopathy with EF15% and ICD implant  She does not have a diuretic requirement  She is nonadherent with her medical therapy  She does have dyspnea with moderate exertion, no orthopnea, no PND and no edema  Inpatient consult to Cardiology  Consult performed by: Louis Crump ordered by: Kenisha Stratton          Review of Systems    Historical Information   Past Medical History:   Diagnosis Date    Cardiac disease     Diabetes mellitus ([facility] [address] )     Hypertension     Pulmonary embolism ([facility] [address] )      History reviewed  No pertinent surgical history    History   Alcohol Use    Yes     Comment: rarely     History   Drug Use No     History   Smoking Status    Never Smoker   Smokeless Tobacco    Never Used     Family History: History reviewed  No pertinent family history  Meds/Allergies   current meds:   Current Facility-Administered Medications   Medication Dose Route Frequency    acetaminophen (TYLENOL) tablet 650 mg  650 mg Oral Q6H PRN    apixaban (ELIQUIS) tablet 5 mg  5 mg Oral BID    aspirin (ECOTRIN LOW STRENGTH) EC tablet 162 mg  162 mg Oral Daily    calcium carbonate (TUMS) chewable tablet 1,000 mg  1,000 mg Oral Daily PRN    carvedilol (COREG) tablet 12 5 mg  12 5 mg Oral BID With Meals    insulin glargine (LANTUS) subcutaneous injection 10 Units  10 Units Subcutaneous HS    insulin lispro (HumaLOG) 100 units/mL subcutaneous injection 1-5 Units  1-5 Units Subcutaneous HS    insulin lispro (HumaLOG) 100 units/mL subcutaneous injection 1-6 Units  1-6 Units Subcutaneous TID AC    lisinopril (ZESTRIL) tablet 10 mg  10 mg Oral Daily    morphine injection 2 mg  2 mg Intravenous Q4H PRN    nitroglycerin (NITROSTAT) SL tablet 0 4 mg  0 4 mg Sublingual Q5 Min PRN    ondansetron (ZOFRAN) injection 4 mg  4 mg Intravenous Q6H PRN    traMADol (ULTRAM) tablet 50 mg  50 mg Oral Q6H PRN     Allergies   Allergen Reactions    Ciprofloxacin Anaphylaxis       Objective   Vitals: Blood pressure 111/69, pulse 99, temperature 98 5 °F (36 9 °C), temperature source Oral, resp  rate 18, height 5' 6" (1 676 m), weight 106 kg (233 lb 0 4 oz), last menstrual period 11/02/2017, SpO2 98 %, not currently breastfeeding    Orthostatic Blood Pressures    Flowsheet Row Most Recent Value   Blood Pressure  111/69 filed at 11/03/2017 0715   Patient Position - Orthostatic VS  Lying filed at 11/03/2017 0715            Intake/Output Summary (Last 24 hours) at 11/03/17 0918  Last data filed at 11/03/17 0749   Gross per 24 hour   Intake              240 ml   Output                0 ml   Net              240 ml       Invasive Devices     Peripheral Intravenous Line            Peripheral IV 11/02/17 Left Forearm less than 1 day    Peripheral IV 11/02/17 Right Antecubital less than 1 day                Physical Exam    Lab Results:   I have personally reviewed pertinent lab results  CBC with diff:   Results from last 7 days  Lab Units 11/03/17  0444   WBC Thousand/uL 5 07   RBC Million/uL 4 81   HEMOGLOBIN g/dL 11 7   HEMATOCRIT % 33 9*   MCV fL 71*   MCH pg 24 3*   MCHC g/dL 34 5   RDW % 13 7   MPV fL 9 8   PLATELETS Thousands/uL 229     CMP:   Results from last 7 days  Lab Units 11/03/17  0444 11/02/17  1216   SODIUM mmol/L 133* 136   POTASSIUM mmol/L 3 7 4 1   CHLORIDE mmol/L 99* 103   CO2 mmol/L 26 25   ANION GAP mmol/L 8 8   BUN mg/dL 13 15   CREATININE mg/dL 0 92 1 00   GLUCOSE RANDOM mg/dL 184* 219*   CALCIUM mg/dL 9 1 9 6   AST U/L  --  14   ALT U/L  --  27   ALK PHOS U/L  --  69   TOTAL PROTEIN g/dL  --  7 8   ALBUMIN g/dL  --  3 4*   BILIRUBIN TOTAL mg/dL  --  1 34*   EGFR ml/min/1 73sq m 88 79     Troponin:   0  Lab Value Date/Time   TROPONINI <0 02 11/02/2017 2255   TROPONINI <0 02 11/02/2017 2043     BNP:   Results from last 7 days  Lab Units 11/03/17  0444   SODIUM mmol/L 133*   POTASSIUM mmol/L 3 7   CHLORIDE mmol/L 99*   CO2 mmol/L 26   ANION GAP mmol/L 8   BUN mg/dL 13   CREATININE mg/dL 0 92   GLUCOSE RANDOM mg/dL 184*   CALCIUM mg/dL 9 1   EGFR ml/min/1 73sq m 88     Coags:   Results from last 7 days  Lab Units 11/02/17  1216   PTT seconds 26   INR  0 99     TSH:   Results from last 7 days  Lab Units 11/03/17  0444   TSH 3RD GENERATON uIU/mL 1 010     Magnesium:   Results from last 7 days  Lab Units 11/03/17  0444   MAGNESIUM mg/dL 1 5*     Lipid Profile:   Results from last 7 days  Lab Units 11/03/17  0444   HDL mg/dL 51   CHOLESTEROL mg/dL 229*   LDL CALC mg/dL 102*   TRIGLYCERIDES mg/dL 380*     Imaging: I have personally reviewed pertinent films in PACS  EKG: NSR  LVH with repolarization abnormality         Code Status: Level 1 - Full Code  Advance Directive and Living Will:      Power of :    POLST:      Counseling / Coordination of Care  Total floor / unit time spent today 45 minutes  Greater than 50% of total time was spent with the patient and / or family counseling and / or coordination of care  A description of the counseling / coordination of care

## 2017-11-03 NOTE — PLAN OF CARE
Problem: DISCHARGE PLANNING - CARE MANAGEMENT  Goal: Discharge to post-acute care or home with appropriate resources  INTERVENTIONS:  - Conduct assessment to determine patient/family and health care team treatment goals, and need for post-acute services based on payer coverage, community resources, and patient preferences, and barriers to discharge  - Address psychosocial, clinical, and financial barriers to discharge as identified in assessment in conjunction with the patient/family and health care team  - Arrange appropriate level of post-acute services according to patient's   needs and preference and payer coverage in collaboration with the physician and health care team  - Communicate with and update the patient/family, physician, and health care team regarding progress on the discharge plan  - Arrange appropriate transportation to post-acute venues  - Discharge home when medically cleared    Outcome: Progressing

## 2017-11-06 ENCOUNTER — HOSPITAL ENCOUNTER (OUTPATIENT)
Dept: NON INVASIVE DIAGNOSTICS | Facility: HOSPITAL | Age: 42
Discharge: HOME | End: 2017-11-06

## 2017-12-12 ENCOUNTER — HOSPITAL ENCOUNTER (EMERGENCY)
Facility: HOSPITAL | Age: 42
Discharge: HOME | End: 2017-12-12
Attending: EMERGENCY MEDICINE | Admitting: EMERGENCY MEDICINE
Payer: COMMERCIAL

## 2017-12-12 ENCOUNTER — APPOINTMENT (EMERGENCY)
Dept: RADIOLOGY | Facility: HOSPITAL | Age: 42
End: 2017-12-12
Payer: COMMERCIAL

## 2017-12-12 VITALS
HEIGHT: 66 IN | RESPIRATION RATE: 16 BRPM | WEIGHT: 235 LBS | OXYGEN SATURATION: 99 % | BODY MASS INDEX: 37.77 KG/M2 | HEART RATE: 101 BPM | DIASTOLIC BLOOD PRESSURE: 99 MMHG | TEMPERATURE: 97.4 F | SYSTOLIC BLOOD PRESSURE: 159 MMHG

## 2017-12-12 DIAGNOSIS — R19.7 DIARRHEA: Primary | ICD-10-CM

## 2017-12-12 DIAGNOSIS — R05.9 COUGH: ICD-10-CM

## 2017-12-12 LAB
ALBUMIN SERPL BCP-MCNC: 3.2 G/DL (ref 3.5–5)
ALP SERPL-CCNC: 63 U/L (ref 46–116)
ALT SERPL W P-5'-P-CCNC: 24 U/L (ref 12–78)
ANION GAP SERPL CALCULATED.3IONS-SCNC: 7 MMOL/L (ref 4–13)
AST SERPL W P-5'-P-CCNC: 18 U/L (ref 5–45)
ATRIAL RATE: 97 BPM
BACTERIA UR QL AUTO: ABNORMAL /HPF
BASOPHILS # BLD AUTO: 0.01 THOUSANDS/ΜL (ref 0–0.1)
BASOPHILS NFR BLD AUTO: 0 % (ref 0–1)
BILIRUB SERPL-MCNC: 0.85 MG/DL (ref 0.2–1)
BILIRUB UR QL STRIP: NEGATIVE
BUN SERPL-MCNC: 13 MG/DL (ref 5–25)
CALCIUM SERPL-MCNC: 9.1 MG/DL (ref 8.3–10.1)
CHLORIDE SERPL-SCNC: 102 MMOL/L (ref 100–108)
CLARITY UR: CLEAR
CO2 SERPL-SCNC: 26 MMOL/L (ref 21–32)
COLOR UR: YELLOW
CREAT SERPL-MCNC: 0.94 MG/DL (ref 0.6–1.3)
EOSINOPHIL # BLD AUTO: 0.08 THOUSAND/ΜL (ref 0–0.61)
EOSINOPHIL NFR BLD AUTO: 1 % (ref 0–6)
ERYTHROCYTE [DISTWIDTH] IN BLOOD BY AUTOMATED COUNT: 13.9 % (ref 11.6–15.1)
EXT PREG TEST URINE: NEGATIVE
GFR SERPL CREATININE-BSD FRML MDRD: 85 ML/MIN/1.73SQ M
GLUCOSE SERPL-MCNC: 242 MG/DL (ref 65–140)
GLUCOSE UR STRIP-MCNC: ABNORMAL MG/DL
HCT VFR BLD AUTO: 35 % (ref 34.8–46.1)
HGB BLD-MCNC: 12.2 G/DL (ref 11.5–15.4)
HGB UR QL STRIP.AUTO: ABNORMAL
HYALINE CASTS #/AREA URNS LPF: ABNORMAL /LPF
KETONES UR STRIP-MCNC: NEGATIVE MG/DL
L PNEUMO1 AG UR QL IA.RAPID: NEGATIVE
LEUKOCYTE ESTERASE UR QL STRIP: NEGATIVE
LYMPHOCYTES # BLD AUTO: 1.7 THOUSANDS/ΜL (ref 0.6–4.47)
LYMPHOCYTES NFR BLD AUTO: 28 % (ref 14–44)
MCH RBC QN AUTO: 24.4 PG (ref 26.8–34.3)
MCHC RBC AUTO-ENTMCNC: 34.9 G/DL (ref 31.4–37.4)
MCV RBC AUTO: 70 FL (ref 82–98)
MONOCYTES # BLD AUTO: 0.27 THOUSAND/ΜL (ref 0.17–1.22)
MONOCYTES NFR BLD AUTO: 5 % (ref 4–12)
NEUTROPHILS # BLD AUTO: 3.95 THOUSANDS/ΜL (ref 1.85–7.62)
NEUTS SEG NFR BLD AUTO: 66 % (ref 43–75)
NITRITE UR QL STRIP: NEGATIVE
NON-SQ EPI CELLS URNS QL MICRO: ABNORMAL /HPF
NRBC BLD AUTO-RTO: 0 /100 WBCS
P AXIS: 59 DEGREES
PH UR STRIP.AUTO: 5.5 [PH] (ref 4.5–8)
PLATELET # BLD AUTO: 270 THOUSANDS/UL (ref 149–390)
PMV BLD AUTO: 9.8 FL (ref 8.9–12.7)
POTASSIUM SERPL-SCNC: 4 MMOL/L (ref 3.5–5.3)
PR INTERVAL: 146 MS
PROT SERPL-MCNC: 7.7 G/DL (ref 6.4–8.2)
PROT UR STRIP-MCNC: >=300 MG/DL
QRS AXIS: 34 DEGREES
QRSD INTERVAL: 90 MS
QT INTERVAL: 382 MS
QTC INTERVAL: 485 MS
RBC # BLD AUTO: 5.01 MILLION/UL (ref 3.81–5.12)
RBC #/AREA URNS AUTO: ABNORMAL /HPF
SODIUM SERPL-SCNC: 135 MMOL/L (ref 136–145)
SP GR UR STRIP.AUTO: >=1.03 (ref 1–1.03)
SPECIMEN SOURCE: NORMAL
T WAVE AXIS: 37 DEGREES
TROPONIN I BLD-MCNC: 0 NG/ML (ref 0–0.08)
UROBILINOGEN UR QL STRIP.AUTO: 0.2 E.U./DL
VENTRICULAR RATE: 97 BPM
WBC # BLD AUTO: 6.02 THOUSAND/UL (ref 4.31–10.16)
WBC #/AREA URNS AUTO: ABNORMAL /HPF

## 2017-12-12 PROCEDURE — 87186 SC STD MICRODIL/AGAR DIL: CPT

## 2017-12-12 PROCEDURE — 93005 ELECTROCARDIOGRAM TRACING: CPT

## 2017-12-12 PROCEDURE — 81025 URINE PREGNANCY TEST: CPT | Performed by: EMERGENCY MEDICINE

## 2017-12-12 PROCEDURE — 99285 EMERGENCY DEPT VISIT HI MDM: CPT

## 2017-12-12 PROCEDURE — 93005 ELECTROCARDIOGRAM TRACING: CPT | Performed by: EMERGENCY MEDICINE

## 2017-12-12 PROCEDURE — 71020 HB CHEST X-RAY 2VW FRONTAL&LATL: CPT

## 2017-12-12 PROCEDURE — 80053 COMPREHEN METABOLIC PANEL: CPT | Performed by: EMERGENCY MEDICINE

## 2017-12-12 PROCEDURE — 81001 URINALYSIS AUTO W/SCOPE: CPT

## 2017-12-12 PROCEDURE — 81002 URINALYSIS NONAUTO W/O SCOPE: CPT | Performed by: EMERGENCY MEDICINE

## 2017-12-12 PROCEDURE — 74177 CT ABD & PELVIS W/CONTRAST: CPT

## 2017-12-12 PROCEDURE — 87077 CULTURE AEROBIC IDENTIFY: CPT

## 2017-12-12 PROCEDURE — 87449 NOS EACH ORGANISM AG IA: CPT | Performed by: EMERGENCY MEDICINE

## 2017-12-12 PROCEDURE — 36415 COLL VENOUS BLD VENIPUNCTURE: CPT | Performed by: EMERGENCY MEDICINE

## 2017-12-12 PROCEDURE — 87086 URINE CULTURE/COLONY COUNT: CPT

## 2017-12-12 PROCEDURE — 85025 COMPLETE CBC W/AUTO DIFF WBC: CPT | Performed by: EMERGENCY MEDICINE

## 2017-12-12 PROCEDURE — 84484 ASSAY OF TROPONIN QUANT: CPT

## 2017-12-12 RX ORDER — ATORVASTATIN CALCIUM 10 MG/1
40 TABLET, FILM COATED ORAL DAILY
COMMUNITY

## 2017-12-12 RX ORDER — ACETAMINOPHEN 325 MG/1
650 TABLET ORAL ONCE
Status: COMPLETED | OUTPATIENT
Start: 2017-12-12 | End: 2017-12-12

## 2017-12-12 RX ORDER — IBUPROFEN 400 MG/1
400 TABLET ORAL ONCE
Status: COMPLETED | OUTPATIENT
Start: 2017-12-12 | End: 2017-12-12

## 2017-12-12 RX ORDER — OMEPRAZOLE 40 MG/1
40 CAPSULE, DELAYED RELEASE ORAL DAILY
COMMUNITY

## 2017-12-12 RX ADMIN — IOHEXOL 100 ML: 350 INJECTION, SOLUTION INTRAVENOUS at 14:38

## 2017-12-12 RX ADMIN — ACETAMINOPHEN 650 MG: 325 TABLET, FILM COATED ORAL at 14:51

## 2017-12-12 RX ADMIN — IBUPROFEN 400 MG: 400 TABLET, FILM COATED ORAL at 14:51

## 2017-12-12 NOTE — DISCHARGE INSTRUCTIONS
The CT abdomen pelvis that we did did not show any evidence of inflammation or bowel obstruction  Did show likely cysts in the kidneys  This does require a follow-up the for ultrasound  The ultrasound also showed a right ovary cyst which also requires ultrasound as an outpatient for follow-up  [facility] emergency department if you have any changes symptoms, worsening symptoms, any other concerns  Please return if he have any symptoms of chest pain worsening shortness of breath or feel like passing out  Acute Cough   WHAT YOU NEED TO KNOW:   An acute cough can last up to 3 weeks  Common causes of an acute cough include a cold, allergies, or a lung infection  DISCHARGE INSTRUCTIONS:   Return to the emergency department if:   · You have trouble breathing or feel short of breath  · You cough up blood, or you see blood in your mucus  · You faint or feel weak or dizzy  · You have chest pain when you cough or take a deep breath  · You have new wheezing  Contact your healthcare provider if:   · You have a fever  · Your cough lasts longer than 4 weeks  · Your symptoms do not improve with treatment  · You have questions or concerns about your condition or care  Medicines:   · Medicines  may be needed to stop the cough, decrease swelling in your airways, or help open your airways  Medicine may also be given to help you cough up mucus  Ask your healthcare provider what over-the-counter medicines you can take  If you have an infection caused by bacteria, you may need antibiotics  · Take your medicine as directed  Contact your healthcare provider if you think your medicine is not helping or if you have side effects  Tell him or her if you are allergic to any medicine  Keep a list of the medicines, vitamins, and herbs you take  Include the amounts, and when and why you take them  Bring the list or the pill bottles to follow-up visits   Carry your medicine list with you in case of an emergency  Manage your symptoms:   · Do not smoke and stay away from others who smoke  Nicotine and other chemicals in cigarettes and cigars can cause lung damage and make your cough worse  Ask your healthcare provider for information if you currently smoke and need help to quit  E-cigarettes or smokeless tobacco still contain nicotine  Talk to your healthcare provider before you use these products  · Drink extra liquids as directed  Liquids will help thin and loosen mucus so you can cough it up  Liquids will also help prevent dehydration  Examples of good liquids to drink include water, fruit juice, and broth  Do not drink liquids that contain caffeine  Caffeine can increase your risk for dehydration  Ask your healthcare provider how much liquid to drink each day  · Rest as directed  Do not do activities that make your cough worse, such as exercise  · Use a humidifier or vaporizer  Use a cool mist humidifier or a vaporizer to increase air moisture in your home  This may make it easier for you to breathe and help decrease your cough  · Eat 2 to 5 mL of honey 2 times each day  Honey can help thin mucus and decrease your cough  · Use cough drops or lozenges  These can help decrease throat irritation and your cough  Follow up with your healthcare provider as directed:  Write down your questions so you remember to ask them during your visits  © 2017 [facility] Information is for End User's use only and may not be sold, redistributed or otherwise used for commercial purposes  All illustrations and images included in CareNotes® are the copyrighted property of [employer]  or [employer]  The above information is an  only  It is not intended as medical advice for individual conditions or treatments  Talk to your doctor, nurse or pharmacist before following any medical regimen to see if it is safe and effective for you      Acute Diarrhea   WHAT YOU NEED TO KNOW:   Acute diarrhea starts quickly and lasts a short time, usually 1 to 3 days  It can last up to 2 weeks  You may not be able to control your diarrhea  Acute diarrhea usually stops on its own  DISCHARGE INSTRUCTIONS:   Return to the emergency department if:   · You feel confused  · Your heartbeat is faster than normal      · Your eyes look deeply sunken, or you have no tears when you cry  · You urinate less than usual, or your urine is dark yellow  · You have blood or mucus in your stools  · You have severe abdominal pain  · You are unable to drink any liquids  Contact your healthcare provider if:   · Your symptoms do not get better with treatment  · You have a fever higher than 101 3°F (38 5°C)  · You have trouble eating and drinking because you are vomiting  · You are thirsty or have a dry mouth  · Your diarrhea does not get better in 7 days  · You have questions or concerns about your condition or care  Follow up with your healthcare provider as directed:  Write down your questions so you remember to ask them during your visits  Medicines:  · Diarrhea medicine  is an over-the-counter medicine that helps slow or stop your diarrhea  If you take other medicines, talk to your healthcare provider before you take diarrhea medicine  · Antibiotics  may be given to help treat an infection caused by bacteria  · Antiparasitics  may be given to treat an infection caused by parasites  · Take your medicine as directed  Contact your healthcare provider if you think your medicine is not helping or if you have side effects  Tell him of her if you are allergic to any medicine  Keep a list of the medicines, vitamins, and herbs you take  Include the amounts, and when and why you take them  Bring the list or the pill bottles to follow-up visits  Carry your medicine list with you in case of an emergency  Self-care:   · Drink liquids as directed    Liquids will help prevent dehydration caused by diarrhea  Ask your healthcare provider how much liquid to drink each day and which liquids are best for you  You may need to drink an oral rehydration solution (ORS)  An ORS has the right amounts of water, salts, and sugar you need to replace body fluids  You can buy an ORS at most grocery stores and pharmacies  · Eat foods that are easy to digest   Examples include rice, lentils, cereal, bananas, potatoes, and bread  It also includes some fruits (bananas, melon), well-cooked vegetables, and lean meats  Avoid foods high in fiber, fat, and sugar  Also avoid caffeine, alcohol, dairy, and red meat until your diarrhea is gone  Prevent acute diarrhea:   · Wash your hands often  Use soap and water  Wash your hands before you eat or prepare food  Also wash your hands after you use the bathroom  Use an alcohol-based hand gel when soap and water are not available  · Keep bathroom surfaces clean  This helps prevent the spread of germs that cause acute diarrhea  · Wash fruits and vegetables well before you eat them  This can help remove germs that cause diarrhea  If possible, remove the skin from fruits and vegetables, or cook them well before you eat them  · Cook meat as directed  ¨ Cook ground meat  to 160°F      ¨ Cook ground poultry, whole poultry, or cuts of poultry  to at least 165°F  Remove the meat from heat  Let it stand for 3 minutes before you eat it  ¨ Cook whole cuts of meat other than poultry  to at least 145°F  Remove the meat from heat  Let it stand for 3 minutes before you eat it  · Wash dishes that have touched raw meat with hot water and soap  This includes cutting boards, utensils, dishes, and serving containers  · Place raw or cooked meat in the refrigerator as soon as possible  Bacteria can grow in meat that is left at room temperature too long  · Do not eat raw or undercooked oysters, clams, or mussels    These foods may be contaminated and cause infection  · Drink filtered or treated water only when you travel  Do not put ice in your drinks  Drink bottled water whenever possible  © 2017 [address] [employer] Information is for End User's use only and may not be sold, redistributed or otherwise used for commercial purposes  All illustrations and images included in CareNotes® are the copyrighted property of A D [employer] M [employer]  or [address]  The above information is an  only  It is not intended as medical advice for individual conditions or treatments  Talk to your doctor, nurse or pharmacist before following any medical regimen to see if it is safe and effective for you

## 2017-12-12 NOTE — ED ATTENDING ATTESTATION
Carlos Elena MD, saw and evaluated the patient  I have discussed the patient with the resident/non-physician practitioner and agree with the resident's/non-physician practitioner's findings, Plan of Care, and MDM as documented in the resident's/non-physician practitioner's note, except where noted  All available labs and Radiology studies were reviewed  At this point I agree with the current assessment done in the Emergency Department  I have conducted an independent evaluation of this patient a history and physical is as follows:      Critical Care Time  CritCare Time    39 yo female c/o uri symptoms and diarrhea and feeling weak   pmh carcinoid sydrome, cardiomyopathy, chf, ef 15%, pe, dm  Pt states two weeks ago started with uri symptoms, cough, pain with cough  Pt with diarrhea started around the same time, no blood in stool, no n/v  No leg swelling or weight gain  Vss, tachy, afebrile, lungs cta, rrr, abdomen soft epigastric tender, no pedal edema  Cardiac workup, bnp, cxr, ekg, trop, ct a/p

## 2017-12-13 ENCOUNTER — HOSPITAL ENCOUNTER (EMERGENCY)
Facility: HOSPITAL | Age: 42
Discharge: HOME | End: 2017-12-13
Attending: EMERGENCY MEDICINE | Admitting: EMERGENCY MEDICINE
Payer: COMMERCIAL

## 2017-12-13 VITALS
BODY MASS INDEX: 37.93 KG/M2 | RESPIRATION RATE: 20 BRPM | SYSTOLIC BLOOD PRESSURE: 150 MMHG | WEIGHT: 235 LBS | DIASTOLIC BLOOD PRESSURE: 79 MMHG | HEART RATE: 99 BPM | TEMPERATURE: 98.2 F | OXYGEN SATURATION: 99 %

## 2017-12-13 DIAGNOSIS — N30.90 CYSTITIS: Primary | ICD-10-CM

## 2017-12-13 LAB
BACTERIA UR QL AUTO: ABNORMAL /HPF
BILIRUB UR QL STRIP: NEGATIVE
CLARITY UR: ABNORMAL
CLARITY, POC: NORMAL
COLOR UR: YELLOW
COLOR, POC: YELLOW
GLUCOSE UR STRIP-MCNC: NEGATIVE MG/DL
HGB UR QL STRIP.AUTO: ABNORMAL
HYALINE CASTS #/AREA URNS LPF: ABNORMAL /LPF
KETONES UR STRIP-MCNC: NEGATIVE MG/DL
LEUKOCYTE ESTERASE UR QL STRIP: ABNORMAL
NITRITE UR QL STRIP: NEGATIVE
NON-SQ EPI CELLS URNS QL MICRO: ABNORMAL /HPF
PH UR STRIP.AUTO: 6 [PH] (ref 4.5–8)
PROT UR STRIP-MCNC: ABNORMAL MG/DL
RBC #/AREA URNS AUTO: ABNORMAL /HPF
SP GR UR STRIP.AUTO: 1.02 (ref 1–1.03)
UROBILINOGEN UR QL STRIP.AUTO: 0.2 E.U./DL
WBC #/AREA URNS AUTO: ABNORMAL /HPF

## 2017-12-13 PROCEDURE — 87086 URINE CULTURE/COLONY COUNT: CPT

## 2017-12-13 PROCEDURE — 99283 EMERGENCY DEPT VISIT LOW MDM: CPT

## 2017-12-13 PROCEDURE — 81002 URINALYSIS NONAUTO W/O SCOPE: CPT | Performed by: EMERGENCY MEDICINE

## 2017-12-13 PROCEDURE — 81001 URINALYSIS AUTO W/SCOPE: CPT

## 2017-12-13 RX ORDER — CEPHALEXIN 500 MG/1
500 CAPSULE ORAL EVERY 12 HOURS SCHEDULED
Qty: 14 CAPSULE | Refills: 0 | Status: SHIPPED | OUTPATIENT
Start: 2017-12-13 | End: 2018-02-11 | Stop reason: ALTCHOICE

## 2017-12-13 NOTE — ED RE-EVALUATION NOTE
Urine macro shows large leukocyte esterase, some blood  Given pts symptoms and results from yesterday, pt has >=50% chance of UTI, will rx keflex while awaiting further results

## 2017-12-13 NOTE — PROGRESS NOTES
Spoke to patient, informed of GNR in urine, but no clear UTI based on symptoms and macro/micro analysis  Pt instructed to RTED for repeat urinalysis  States she will come in today 12/13/17 for repeat

## 2017-12-13 NOTE — ED PROVIDER NOTES
History  Chief Complaint   Patient presents with    Possible UTI     Pt coming in per Dr Daniel Gonzalez for urine sample  Pt states shes having lower abdominal cramping      37 yo female presents for evaluation of possible UTI  Was here 1-2 days ago for symptoms of URI and while here, had urine testing which showed 10-20 WBCs/hpf, nitrite negative and contaminated with epithelial cells  Her culture today was + for 100k CFU GNRs  I called pt today to follow up, she had denied dysuria and increased urinary frequency, abd pain, flank pain over the phone although she did endorse increased urinary urgency  Denies f/c/n/v  No other c/o at this time  Pt was instructed to RTED to obtain a repeat urine sample  Imp: possible UTI  Plan: repeat UA  Cannot display prior to admission medications because the patient has not been admitted in this contact  Past Medical History:   Diagnosis Date    Cardiac disease     Diabetes mellitus [facility] [address] )     Hypertension     Pulmonary embolism ([facility] [address] [facility]     Stomach ulcer        History reviewed  No pertinent surgical history  History reviewed  No pertinent family history  I have reviewed and agree with the history as documented  Social History   Substance Use Topics    Smoking status: Never Smoker    Smokeless tobacco: Never Used    Alcohol use No      Comment: rarely        Review of Systems   Constitutional: Negative for chills and fever  Gastrointestinal: Negative for abdominal pain, nausea and vomiting  Genitourinary: Positive for urgency  Negative for difficulty urinating, dysuria, flank pain, frequency, hematuria and pelvic pain  All other systems reviewed and are negative        Physical Exam  ED Triage Vitals   Temperature Pulse Respirations Blood Pressure SpO2   12/13/17 1442 12/13/17 1442 12/13/17 1442 12/13/17 1443 12/13/17 1442   98 2 °F (36 8 °C) 99 20 150/79 99 %      Temp Source Heart Rate Source Patient Position - Orthostatic VS BP Location FiO2 (%)   12/13/17 1442 12/13/17 1442 -- -- --   Tympanic Monitor         Pain Score       12/13/17 1442       7           Orthostatic Vital Signs  Vitals:    12/13/17 1442 12/13/17 1443   BP:  150/79   Pulse: 99        Physical Exam   Constitutional: She is oriented to person, place, and time  She appears well-developed and well-nourished  HENT:   Head: Normocephalic and atraumatic  Mouth/Throat: Oropharynx is clear and moist    Eyes: EOM are normal  Pupils are equal, round, and reactive to light  Neck: Normal range of motion  Neck supple  Cardiovascular: Normal rate and regular rhythm  Pulmonary/Chest: Effort normal and breath sounds normal    Abdominal: Soft  Bowel sounds are normal  There is no tenderness  Musculoskeletal: Normal range of motion  Neurological: She is alert and oriented to person, place, and time  Skin: Skin is warm and dry  Capillary refill takes less than 2 seconds  No rash noted  Psychiatric: She has a normal mood and affect  Nursing note and vitals reviewed  ED Medications  Medications - No data to display    Diagnostic Studies  Results Reviewed     Procedure Component Value Units Date/Time    POCT urinalysis dipstick [[id number]]     Lab Status:  No result Specimen:  Urine                  No orders to display              Procedures  Procedures       Phone Contacts  ED Phone Contact    ED Course  ED Course                                [facility]  CritCare Time    Disposition  Final diagnoses:   None     ED Disposition     None      Follow-up Information    None       Patient's Medications   Discharge Prescriptions    No medications on file     No discharge procedures on file      ED Provider  Electronically Signed by           Livia Schafer DO  12/13/17 5757

## 2017-12-13 NOTE — DISCHARGE INSTRUCTIONS
Urinary Traction Infection in Adults   WHAT YOU NEED TO KNOW:   A urinary tract infection (UTI) is caused by bacteria that get inside your urinary tract  Your urinary tract includes your kidneys, ureters, bladder, and urethra  Urine is made in your kidneys, and it flows from the ureters to the bladder  Urine leaves the bladder through the urethra  A UTI is more common in your lower urinary tract, which includes your bladder and urethra  DISCHARGE INSTRUCTIONS:   Return to the emergency department if:   · You are urinating very little or not at all  · You are vomiting  · You have a high fever with shaking chills  · You have side or back pain that gets worse  Contact your healthcare provider if:   · You have a fever  · You are a woman and you have increased white or yellow discharge from your vagina  · You do not feel better after 2 days of taking antibiotics  · You have questions or concerns about your condition or care  Medicines:   · Medicines  help treat the bacterial infection or decrease pain and burning when you urinate  You may also need medicines to decrease the urge to urinate often  Your healthcare provider may recommend cranberry juice or cranberry supplements to help decrease your symptoms  · Take your medicine as directed  Contact your healthcare provider if you think your medicine is not helping or if you have side effects  Tell him or her if you are allergic to any medicine  Keep a list of the medicines, vitamins, and herbs you take  Include the amounts, and when and why you take them  Bring the list or the pill bottles to follow-up visits  Carry your medicine list with you in case of an emergency  Self-care:   · Urinate when you feel the urge  Do not hold your urine because bacteria can grow in the bladder if urine stays in the bladder too long  It may be helpful to urinate at least every 3 to 4 hours  · Drink liquids as directed    Liquids can help flush bacteria from your urinary tract  Ask how much liquid to drink each day and which liquids are best for you  You may need to drink more liquids than usual to help flush out the bacteria  Do not drink alcohol, caffeine, and citrus juices  These can irritate your bladder and increase your symptoms  · Apply heat  on your abdomen for 20 to 30 minutes every 2 hours for as many days as directed  Heat helps decrease discomfort and pressure in your bladder  Prevent a UTI:   · Women should wipe front to back  after urinating or having a bowel movement  This may prevent germs from getting into the urinary tract  · Urinate after you have sex  to flush away bacteria that can enter your urinary tract during sex  · Wear cotton underwear and clothes that fit loose  Tight pants and nylon underwear can trap moisture and cause bacteria to grow  Follow up with your healthcare provider as directed:  Write down your questions so you remember to ask them during your visits  © 2017 [address] [employer] Information is for End User's use only and may not be sold, redistributed or otherwise used for commercial purposes  All illustrations and images included in CareNotes® are the copyrighted property of [employer] M [employer]  or [employer]  The above information is an  only  It is not intended as medical advice for individual conditions or treatments  Talk to your doctor, nurse or pharmacist before following any medical regimen to see if it is safe and effective for you

## 2017-12-14 LAB — BACTERIA UR CULT: ABNORMAL

## 2017-12-15 LAB — BACTERIA UR CULT: NORMAL

## 2018-01-08 ENCOUNTER — HOSPITAL ENCOUNTER (EMERGENCY)
Facility: HOSPITAL | Age: 43
Discharge: HOME | End: 2018-01-09
Attending: EMERGENCY MEDICINE | Admitting: EMERGENCY MEDICINE
Payer: COMMERCIAL

## 2018-01-08 DIAGNOSIS — M25.552 LEFT HIP PAIN: Primary | ICD-10-CM

## 2018-01-08 DIAGNOSIS — R10.32 LEFT GROIN PAIN: ICD-10-CM

## 2018-01-08 DIAGNOSIS — N39.0 UTI (URINARY TRACT INFECTION): ICD-10-CM

## 2018-01-09 ENCOUNTER — APPOINTMENT (EMERGENCY)
Dept: RADIOLOGY | Facility: HOSPITAL | Age: 43
End: 2018-01-09
Payer: COMMERCIAL

## 2018-01-09 VITALS
TEMPERATURE: 97.8 F | RESPIRATION RATE: 18 BRPM | WEIGHT: 235 LBS | OXYGEN SATURATION: 97 % | DIASTOLIC BLOOD PRESSURE: 70 MMHG | BODY MASS INDEX: 37.77 KG/M2 | HEIGHT: 66 IN | SYSTOLIC BLOOD PRESSURE: 136 MMHG | HEART RATE: 102 BPM

## 2018-01-09 LAB
ALBUMIN SERPL BCP-MCNC: 3.4 G/DL (ref 3.5–5)
ALP SERPL-CCNC: 74 U/L (ref 46–116)
ALT SERPL W P-5'-P-CCNC: 23 U/L (ref 12–78)
ANION GAP SERPL CALCULATED.3IONS-SCNC: 10 MMOL/L (ref 4–13)
AST SERPL W P-5'-P-CCNC: 18 U/L (ref 5–45)
ATRIAL RATE: 100 BPM
BACTERIA UR QL AUTO: ABNORMAL /HPF
BASOPHILS # BLD AUTO: 0.02 THOUSANDS/ΜL (ref 0–0.1)
BASOPHILS NFR BLD AUTO: 0 % (ref 0–1)
BILIRUB SERPL-MCNC: 0.62 MG/DL (ref 0.2–1)
BILIRUB UR QL STRIP: NEGATIVE
BUN SERPL-MCNC: 16 MG/DL (ref 5–25)
CALCIUM SERPL-MCNC: 9.1 MG/DL (ref 8.3–10.1)
CHLORIDE SERPL-SCNC: 99 MMOL/L (ref 100–108)
CLARITY UR: CLEAR
CO2 SERPL-SCNC: 25 MMOL/L (ref 21–32)
COLOR UR: YELLOW
COLOR, POC: NORMAL
CREAT SERPL-MCNC: 1.07 MG/DL (ref 0.6–1.3)
DEPRECATED D DIMER PPP: 345 NG/ML (FEU) (ref 0–424)
EOSINOPHIL # BLD AUTO: 0.21 THOUSAND/ΜL (ref 0–0.61)
EOSINOPHIL NFR BLD AUTO: 3 % (ref 0–6)
ERYTHROCYTE [DISTWIDTH] IN BLOOD BY AUTOMATED COUNT: 13.9 % (ref 11.6–15.1)
EXT PREG TEST URINE: NEGATIVE
GFR SERPL CREATININE-BSD FRML MDRD: 73 ML/MIN/1.73SQ M
GLUCOSE SERPL-MCNC: 339 MG/DL (ref 65–140)
GLUCOSE UR STRIP-MCNC: ABNORMAL MG/DL
HCT VFR BLD AUTO: 33.2 % (ref 34.8–46.1)
HGB BLD-MCNC: 11.5 G/DL (ref 11.5–15.4)
HGB UR QL STRIP.AUTO: ABNORMAL
HYALINE CASTS #/AREA URNS LPF: ABNORMAL /LPF
KETONES UR STRIP-MCNC: NEGATIVE MG/DL
LEUKOCYTE ESTERASE UR QL STRIP: ABNORMAL
LYMPHOCYTES # BLD AUTO: 2.55 THOUSANDS/ΜL (ref 0.6–4.47)
LYMPHOCYTES NFR BLD AUTO: 42 % (ref 14–44)
MCH RBC QN AUTO: 24.5 PG (ref 26.8–34.3)
MCHC RBC AUTO-ENTMCNC: 34.6 G/DL (ref 31.4–37.4)
MCV RBC AUTO: 71 FL (ref 82–98)
MONOCYTES # BLD AUTO: 0.29 THOUSAND/ΜL (ref 0.17–1.22)
MONOCYTES NFR BLD AUTO: 5 % (ref 4–12)
NEUTROPHILS # BLD AUTO: 3.01 THOUSANDS/ΜL (ref 1.85–7.62)
NEUTS SEG NFR BLD AUTO: 50 % (ref 43–75)
NITRITE UR QL STRIP: NEGATIVE
NON-SQ EPI CELLS URNS QL MICRO: ABNORMAL /HPF
NRBC BLD AUTO-RTO: 0 /100 WBCS
P AXIS: 55 DEGREES
PH UR STRIP.AUTO: 6 [PH] (ref 4.5–8)
PLATELET # BLD AUTO: 226 THOUSANDS/UL (ref 149–390)
PMV BLD AUTO: 9.8 FL (ref 8.9–12.7)
POTASSIUM SERPL-SCNC: 4.1 MMOL/L (ref 3.5–5.3)
PR INTERVAL: 154 MS
PROT SERPL-MCNC: 7.4 G/DL (ref 6.4–8.2)
PROT UR STRIP-MCNC: ABNORMAL MG/DL
QRS AXIS: 69 DEGREES
QRSD INTERVAL: 88 MS
QT INTERVAL: 312 MS
QTC INTERVAL: 402 MS
RBC # BLD AUTO: 4.69 MILLION/UL (ref 3.81–5.12)
RBC #/AREA URNS AUTO: ABNORMAL /HPF
SODIUM SERPL-SCNC: 134 MMOL/L (ref 136–145)
SP GR UR STRIP.AUTO: 1.02 (ref 1–1.03)
T WAVE AXIS: 12 DEGREES
UROBILINOGEN UR QL STRIP.AUTO: 0.2 E.U./DL
VENTRICULAR RATE: 100 BPM
WBC # BLD AUTO: 6.09 THOUSAND/UL (ref 4.31–10.16)
WBC #/AREA URNS AUTO: ABNORMAL /HPF

## 2018-01-09 PROCEDURE — 96374 THER/PROPH/DIAG INJ IV PUSH: CPT

## 2018-01-09 PROCEDURE — 81001 URINALYSIS AUTO W/SCOPE: CPT

## 2018-01-09 PROCEDURE — 81002 URINALYSIS NONAUTO W/O SCOPE: CPT | Performed by: EMERGENCY MEDICINE

## 2018-01-09 PROCEDURE — 87077 CULTURE AEROBIC IDENTIFY: CPT

## 2018-01-09 PROCEDURE — 81025 URINE PREGNANCY TEST: CPT | Performed by: EMERGENCY MEDICINE

## 2018-01-09 PROCEDURE — 87086 URINE CULTURE/COLONY COUNT: CPT

## 2018-01-09 PROCEDURE — 73502 X-RAY EXAM HIP UNI 2-3 VIEWS: CPT

## 2018-01-09 PROCEDURE — 85025 COMPLETE CBC W/AUTO DIFF WBC: CPT | Performed by: EMERGENCY MEDICINE

## 2018-01-09 PROCEDURE — 87186 SC STD MICRODIL/AGAR DIL: CPT

## 2018-01-09 PROCEDURE — 99284 EMERGENCY DEPT VISIT MOD MDM: CPT

## 2018-01-09 PROCEDURE — 93005 ELECTROCARDIOGRAM TRACING: CPT

## 2018-01-09 PROCEDURE — 85379 FIBRIN DEGRADATION QUANT: CPT | Performed by: EMERGENCY MEDICINE

## 2018-01-09 PROCEDURE — 80053 COMPREHEN METABOLIC PANEL: CPT | Performed by: EMERGENCY MEDICINE

## 2018-01-09 PROCEDURE — 36415 COLL VENOUS BLD VENIPUNCTURE: CPT | Performed by: EMERGENCY MEDICINE

## 2018-01-09 PROCEDURE — 93005 ELECTROCARDIOGRAM TRACING: CPT | Performed by: EMERGENCY MEDICINE

## 2018-01-09 RX ORDER — TRAMADOL HYDROCHLORIDE 50 MG/1
50 TABLET ORAL EVERY 6 HOURS PRN
Qty: 5 TABLET | Refills: 0 | Status: SHIPPED | OUTPATIENT
Start: 2018-01-09 | End: 2018-01-14

## 2018-01-09 RX ORDER — KETOROLAC TROMETHAMINE 30 MG/ML
15 INJECTION, SOLUTION INTRAMUSCULAR; INTRAVENOUS ONCE
Status: COMPLETED | OUTPATIENT
Start: 2018-01-09 | End: 2018-01-09

## 2018-01-09 RX ORDER — NITROFURANTOIN MACROCRYSTALS 100 MG/1
100 CAPSULE ORAL 2 TIMES DAILY
Qty: 14 CAPSULE | Refills: 0 | Status: SHIPPED | OUTPATIENT
Start: 2018-01-09 | End: 2018-01-16

## 2018-01-09 RX ADMIN — KETOROLAC TROMETHAMINE 15 MG: 30 INJECTION, SOLUTION INTRAMUSCULAR at 01:22

## 2018-01-09 NOTE — ED ATTENDING ATTESTATION
Antonella Farfan MD, saw and evaluated the patient  I have discussed the patient with the resident/non-physician practitioner and agree with the resident's/non-physician practitioner's findings, Plan of Care, and MDM as documented in the resident's/non-physician practitioner's note, except where noted  All available labs and Radiology studies were reviewed  At this point I agree with the current assessment done in the Emergency Department  I have conducted an independent evaluation of this patient a history and physical is as follows:  Pt is on eloquis for PE but is not taking eloquis Presents with groin pain that that radiates around back of leg to back of knee  Pt denies cp or sob/ Pain is worse with movement   Pt also co foul smelling urine and suprapubic pain like uti in past PE: alert tender L groin pain with movement of hip good nv sensation wnl abd soft mild suprapubic tenderness no cva or flank tenderness MDM: will do dimer xray hip check urine    Critical Care Time  CritCare Time    Procedures

## 2018-01-09 NOTE — ED PROVIDER NOTES
History  Chief Complaint   Patient presents with    Leg Pain     Patient states left leg pain x 2 weeks especially when she wakes up  Patient also would like to be checked for a "recurrent UTI "     35yo female pmhx cardiomyopathy, HTN, DM, CAD, PE (noncompliant with eliquis) presents for evaluation of left groin pain  Patient says pain began two weeks ago and has gradually worsened  Pain is constant, 6 out of 10, exacerbated by movement or bearing weight on left leg  Occasional radiation to behind left knee  Patient also complains of possibly having a recurrent UTI, urine continues to have a strong odor and mild suprapubic discomfort  She denies numbness, tingling, weakness, or swelling  Denies fever, chills, cough, chest pain, SOB, diarrhea, or dysuria  Notes a car ride of 4hrs last week  Denies recent travel or surgery  Prior to Admission Medications   Prescriptions Last Dose Informant Patient Reported? Taking? apixaban (ELIQUIS) 5 mg   Yes No   Sig: Take 5 mg by mouth 2 (two) times a day   aspirin (ECOTRIN LOW STRENGTH) 81 mg EC tablet   Yes No   Sig: Take 81 mg by mouth daily   atorvastatin (LIPITOR) 10 mg tablet   Yes No   Sig: Take 40 mg by mouth daily     carvedilol (COREG) 12 5 mg tablet   Yes No   Sig: Take 12 5 mg by mouth 2 (two) times a day with meals   lisinopril (ZESTRIL) 10 mg tablet   Yes No   Sig: Take 10 mg by mouth daily   metFORMIN (GLUCOPHAGE-XR) 500 mg 24 hr tablet   Yes No   Sig: Take by mouth 2 (two) times a day with meals   nitroglycerin (NITROSTAT) 0 4 mg SL tablet   Yes No   Sig: Place 0 4 mg under the tongue every 5 (five) minutes as needed for chest pain   omeprazole (PriLOSEC) 40 MG capsule   Yes No   Sig: Take 40 mg by mouth daily      Facility-Administered Medications: None       Past Medical History:   Diagnosis Date    Cardiac disease     [facility] mellitus ([facility] [address] )     Hypertension     Pulmonary embolism ([facility] [address] [facility]     Stomach ulcer        History reviewed   No pertinent surgical history  History reviewed  No pertinent family history  I have reviewed and agree with the history as documented  Social History   Substance Use Topics    Smoking status: Never Smoker    Smokeless tobacco: Never Used    Alcohol use No      Comment: rarely        Review of Systems   Constitutional: Negative for chills, diaphoresis, fatigue and fever  HENT: Negative for congestion, rhinorrhea and sore throat  Eyes: Negative for photophobia and visual disturbance  Respiratory: Negative for cough, chest tightness and shortness of breath  Cardiovascular: Negative for chest pain and palpitations  Gastrointestinal: Negative for abdominal pain, blood in stool, constipation, diarrhea, nausea and vomiting  Genitourinary: Positive for pelvic pain  Negative for decreased urine volume, dysuria, frequency and hematuria  Musculoskeletal: Positive for gait problem  Negative for back pain, myalgias, neck pain and neck stiffness  Skin: Negative for pallor and rash  Neurological: Negative for weakness, light-headedness, numbness and headaches  Hematological: Negative for adenopathy  Does not bruise/bleed easily  All other systems reviewed and are negative  Physical Exam  ED Triage Vitals [01/08/18 2244]   Temperature Pulse Respirations Blood Pressure SpO2   97 8 °F (36 6 °C) (!) 111 18 140/76 100 %      Temp Source Heart Rate Source Patient Position - Orthostatic VS BP Location FiO2 (%)   Oral Monitor Sitting Right arm --      Pain Score       6           Orthostatic Vital Signs  Vitals:    01/08/18 2244 01/09/18 0043 01/09/18 0138   BP: 140/76 136/70 136/70   Pulse: (!) 111 100 102   Patient Position - Orthostatic VS: Sitting Lying Lying       Physical Exam   Constitutional: She is oriented to person, place, and time  She appears well-developed and well-nourished  No distress     Patient is alert and oriented, appears to be in pain with movement but appears well and nontoxic, in no acute distress   HENT:   Head: Normocephalic and atraumatic  Mouth/Throat: Oropharynx is clear and moist  No oropharyngeal exudate  Eyes: Conjunctivae and EOM are normal  Pupils are equal, round, and reactive to light  Neck: Normal range of motion  Neck supple  Cardiovascular: Regular rhythm, normal heart sounds and intact distal pulses  Tachy at 112   Pulmonary/Chest: Effort normal and breath sounds normal  No respiratory distress  She has no wheezes  She has no rales  Abdominal: Soft  Bowel sounds are normal  She exhibits no distension  There is no tenderness  Musculoskeletal: Normal range of motion  She exhibits tenderness  She exhibits no edema or deformity  Left inguinal pain on palpation  Full ROM but has pain   Sensation and pulses intact    Lymphadenopathy:     She has no cervical adenopathy  Neurological: She is alert and oriented to person, place, and time  No facial asymmetry noted, CN 2-12 intact, full ROM of upper and lower extremities, muscle strength 5/5 throughout, DTRs normal, sensation intact throughout, negative finger to nose/Nelly, negative Romberg's, negative Babinski, no gait disturbance noted   Skin: Skin is warm and dry  Capillary refill takes less than 2 seconds  No rash noted  She is not diaphoretic  No erythema  No pallor  Psychiatric: She has a normal mood and affect  Her behavior is normal  Judgment and thought content normal    Nursing note and vitals reviewed        ED Medications  Medications   ketorolac (TORADOL) injection 15 mg (15 mg Intravenous Given 1/9/18 0122)       Diagnostic Studies  Results Reviewed     Procedure Component Value Units Date/Time    Urine Microscopic [[id number]]  (Abnormal) Collected:  01/09/18 0123    Lab Status:  Final result Specimen:  Urine from Urine, Clean Catch Updated:  01/09/18 0131     RBC, UA None Seen /hpf      WBC, UA Innumerable (A) /hpf      Epithelial Cells None Seen /hpf      Bacteria, UA Moderate (A) /hpf      Hyaline Casts, UA None Seen /lpf     Urine culture [[id number]] Collected:  01/09/18 0123    Lab Status: In process Specimen:  Urine from Urine, Clean Catch Updated:  01/09/18 0130    ED Urine Macroscopic [[id number]]  (Abnormal) Collected:  01/09/18 0123    Lab Status:  Final result Specimen:  Urine Updated:  01/09/18 0121     Color, UA Yellow     Clarity, UA Clear     pH, UA 6 0     Leukocytes, UA Small (A)     Nitrite, UA Negative     Protein, UA 30 (1+) (A) mg/dl      Glucose, UA >=1000 (1%) (A) mg/dl      Ketones, UA Negative mg/dl      Urobilinogen, UA 0 2 E U /dl      Bilirubin, UA Negative     Blood, UA Trace (A)     Specific Mathews, UA 1 020    Narrative:       CLINITEK RESULT    POCT urinalysis dipstick [[id number]]  (Normal) Resulted:  01/09/18 0121    Lab Status:  Final result Specimen:  Urine Updated:  01/09/18 0121     Color, UA complete    POCT pregnancy, urine [[id number]]  (Normal) Resulted:  01/09/18 0121    Lab Status:  Final result Updated:  01/09/18 0121     EXT PREG TEST UR (Ref: Negative) negative    Comprehensive metabolic panel [[id number]]  (Abnormal) Collected:  01/09/18 0049    Lab Status:  Final result Specimen:  Blood from Arm, Right Updated:  01/09/18 0116     Sodium 134 (L) mmol/L      Potassium 4 1 mmol/L      Chloride 99 (L) mmol/L      CO2 25 mmol/L      Anion Gap 10 mmol/L      BUN 16 mg/dL      Creatinine 1 07 mg/dL      Glucose 339 (H) mg/dL      Calcium 9 1 mg/dL      AST 18 U/L      ALT 23 U/L      Alkaline Phosphatase 74 U/L      Total Protein 7 4 g/dL      Albumin 3 4 (L) g/dL      Total Bilirubin 0 62 mg/dL      eGFR 73 ml/min/1 73sq m     Narrative:         National Kidney Disease Education Program recommendations are as follows:  GFR calculation is accurate only with a steady state creatinine  Chronic Kidney disease less than 60 ml/min/1 73 sq  meters  Kidney failure less than 15 ml/min/1 73 sq  meters      D-Dimer [[id number]]  (Normal) Collected:  01/09/18 0049    Lab Status:  Final result Specimen:  Blood from Arm, Right Updated:  01/09/18 0111     D-Dimer, Quant 345 ng/ml (FEU)     CBC and differential [[id number]]  (Abnormal) Collected:  01/09/18 0049    Lab Status:  Final result Specimen:  Blood from Arm, Right Updated:  01/09/18 0057     WBC 6 09 Thousand/uL      RBC 4 69 Million/uL      Hemoglobin 11 5 g/dL      Hematocrit 33 2 (L) %      MCV 71 (L) fL      MCH 24 5 (L) pg      MCHC 34 6 g/dL      RDW 13 9 %      MPV 9 8 fL      Platelets 000 Thousands/uL      nRBC 0 /100 WBCs      Neutrophils Relative 50 %      Lymphocytes Relative 42 %      Monocytes Relative 5 %      Eosinophils Relative 3 %      Basophils Relative 0 %      Neutrophils Absolute 3 01 Thousands/µL      Lymphocytes Absolute 2 55 Thousands/µL      Monocytes Absolute 0 29 Thousand/µL      Eosinophils Absolute 0 21 Thousand/µL      Basophils Absolute 0 02 Thousands/µL                  XR hip/pelv 2-3 vws left    (Results Pending)         Procedures  Procedures      Phone Consults  ED Phone Contact    ED Course  ED Course                                MDM  Number of Diagnoses or Management Options  Left groin pain:   Left hip pain:   UTI (urinary tract infection):   Diagnosis management comments: Impression:  26-year-old female presents for evaluation of left groin pain  Ddx:  DVT, musculoskeletal  Plan: cbc, cmp, d dimer, ekg, toradol, IVFs, ua    Wells criteria for DVT: 1-2, will d-dimer    I discussed lab and imaging results with patient  I told her left hip and groin pain are most likely the due to arthritis  D-dimer was within normal range so the chance of a DVT is very low  Discharge calmer with PCP and orthopedic follow-up  Antibiotic written for recurrent UTI  Return precautions discussed        CritCare Time    Disposition  Final diagnoses:   Left hip pain   Left groin pain   UTI (urinary tract infection)     Time reflects when diagnosis was documented in both MDM as applicable and the Disposition within this note Time User Action Codes Description Comment    1/9/2018  1:32 AM Louis Cunningham Add [M25 552] Left hip pain     1/9/2018  1:32 AM Louis Cunningham Add [R10 32] Left groin pain     1/9/2018  1:32 AM Louis Cunningham Add [N39 0] UTI (urinary tract infection)       ED Disposition     ED Disposition Condition Comment    Discharge  Azam Keen discharge to home/self care      Condition at discharge: Good        Follow-up Information     Follow up With Specialties Details Why [address]  Call in 3 days As needed, If symptoms worsen [address] [facility] Orthopedic Surgery Schedule an appointment as soon as possible for a visit If symptoms worsen, As needed [address] [phone]  [phone]        Discharge Medication List as of 1/9/2018  1:38 AM      START taking these medications    Details   nitrofurantoin (MACRODANTIN) 100 mg capsule Take 1 capsule by mouth 2 (two) times a day for 7 days, Starting Tue 1/9/2018, Until Tue 1/16/2018, Print      traMADol (ULTRAM) 50 mg tablet Take 1 tablet by mouth every 6 (six) hours as needed for moderate pain for up to 5 days, Starting Tue 1/9/2018, Until Sun 1/14/2018, Print         CONTINUE these medications which have NOT CHANGED    Details   apixaban (ELIQUIS) 5 mg Take 5 mg by mouth 2 (two) times a day, Historical Med      aspirin (ECOTRIN LOW STRENGTH) 81 mg EC tablet Take 81 mg by mouth daily, Historical Med      atorvastatin (LIPITOR) 10 mg tablet Take 40 mg by mouth daily  , Historical Med      carvedilol (COREG) 12 5 mg tablet Take 12 5 mg by mouth 2 (two) times a day with meals, Historical Med      lisinopril (ZESTRIL) 10 mg tablet Take 10 mg by mouth daily, Historical Med      metFORMIN (GLUCOPHAGE-XR) 500 mg 24 hr tablet Take by mouth 2 (two) times a day with meals, Historical Med      nitroglycerin (NITROSTAT) 0 4 mg SL tablet Place 0 4 mg under the tongue every 5 (five) minutes as needed for chest pain, Historical Med      omeprazole (PriLOSEC) 40 MG capsule Take 40 mg by mouth daily, Historical Med           No discharge procedures on file  ED Provider  Attending physically available and evaluated Shane Miguelitolou  SILVANO managed the patient along with the ED Attending      Electronically Signed by         Leora Hewitt MD  Resident  01/09/18 2635

## 2018-01-09 NOTE — ED PROCEDURE NOTE
Procedure  ECG 12 Lead Documentation  Date/Time: 1/9/2018 1:00 AM  Performed by: Christine Crane  Authorized by: Jeane HANNON     Indications / Diagnosis:  Tachycardic   ECG reviewed by me, the ED Provider: yes    Patient location:  ED and bedside  Previous ECG:     Previous ECG:  Compared to current    Similarity:  No change  Interpretation:     Interpretation: non-specific    Rate:     ECG rate:  100    ECG rate assessment: tachycardic    Rhythm:     Rhythm: sinus rhythm    Ectopy:     Ectopy: none    QRS:     QRS axis:  Normal    QRS intervals:  Normal  Conduction:     Conduction: normal    ST segments:     ST segments:  Normal  T waves:     T waves: non-specific and inverted      Inverted:  I, II and V6

## 2018-01-09 NOTE — DISCHARGE INSTRUCTIONS
Hip Pain   WHAT YOU NEED TO KNOW:   Hip pain can be caused by a number of conditions, such as bursitis, arthritis, or muscle or tendon strain  X-rays do not show broken bones  You may have swelling in the fluid-filled sacs that protect your muscles and tendons  Hip pain can also be caused by a lower back problem  Hip pain may be caused by trauma, playing sports, or running  Your pain may start in your hip and go to your thigh, buttock, or groin  DISCHARGE INSTRUCTIONS:   Medicines:   · NSAIDs , such as ibuprofen, help decrease swelling, pain, and fever  This medicine is available with or without a doctor's order  NSAIDs can cause stomach bleeding or kidney problems in certain people  If you take blood thinner medicine, always ask your healthcare provider if NSAIDs are safe for you  Always read the medicine label and follow directions  · Take your medicine as directed  Contact your healthcare provider if you think your medicine is not helping or if you have side effects  Tell him of her if you are allergic to any medicine  Keep a list of the medicines, vitamins, and herbs you take  Include the amounts, and when and why you take them  Bring the list or the pill bottles to follow-up visits  Carry your medicine list with you in case of an emergency  Return to the emergency department if:   · Your pain gets worse  · You have numbness in your leg or toes  · You cannot put any weight on or move your hip  Contact your healthcare provider if:   · You have a fever  · Your pain does not decrease, even after treatment  · You have questions or concerns about your condition or care  Follow up with your healthcare provider as directed: You may need physical therapy, an injection, or more testing  You may need to see an orthopedic specialist  Write down your questions so you remember to ask them during your visits    Manage your hip pain:   · Rest  your injured hip so that it can heal  You may need to avoid putting any weight on your hip for at least 48 hours  Return to normal activities as directed  · Ice  the injury for 20 minutes every 4 hours, or as directed  Use an ice pack, or put crushed ice in a plastic bag  Cover it with a towel to protect your skin  Ice helps prevent tissue damage and decreases swelling and pain  · Elevate  your injured hip above the level of your heart as often as you can  This will help decrease swelling and pain  If possible, prop your hip and leg on pillows or blankets to keep the area elevated comfortably  · Maintain a healthy weight  Extra body weight can cause pressure and pain in your hip, knee, and ankle joints  Ask your healthcare provider how much you should weigh  Ask him to help you create a weight loss plan if you are overweight  · Use assistive devices as directed  You may need to use a cane or crutches  Assistive devices help decrease pain and pressure on your hip when you walk  Ask your healthcare provider for more information about assistive devices and how to use them correctly  © 2017 [facility] Information is for End User's use only and may not be sold, redistributed or otherwise used for commercial purposes  All illustrations and images included in CareNotes® are the copyrighted property of A D A M , [employer]  or [employer]  The above information is an  only  It is not intended as medical advice for individual conditions or treatments  Talk to your doctor, nurse or pharmacist before following any medical regimen to see if it is safe and effective for you  Urinary Tract Infection in Women   WHAT YOU NEED TO KNOW:   A urinary tract infection (UTI) is caused by bacteria that get inside your urinary tract  Most bacteria that enter your urinary tract come out when you urinate  If the bacteria stay in your urinary tract, you may get an infection  Your urinary tract includes your kidneys, ureters, bladder, and urethra  Urine is made in your kidneys, and it flows from the ureters to the bladder  Urine leaves the bladder through the urethra  A UTI is more common in your lower urinary tract, which includes your bladder and urethra  DISCHARGE INSTRUCTIONS:   Seek care immediately if:   · You are urinating very little or not at all  · You have a high fever with shaking chills  · You have side or back pain that gets worse  Contact your healthcare provider if:   · You have a fever  · You do not feel better after 2 days of taking antibiotics  · You are vomiting  · You have questions or concerns about your condition or care  Medicines:   · Antibiotics  help fight a bacterial infection  · Medicines  may be given to decrease pain and burning when you urinate  They will also help decrease the feeling that you need to urinate often  These medicines will make your urine orange or red  · Take your medicine as directed  Contact your healthcare provider if you think your medicine is not helping or if you have side effects  Tell him or her if you are allergic to any medicine  Keep a list of the medicines, vitamins, and herbs you take  Include the amounts, and when and why you take them  Bring the list or the pill bottles to follow-up visits  Carry your medicine list with you in case of an emergency  Follow up with your healthcare provider as directed:  Write down your questions so you remember to ask them during your visits  Prevent another UTI:   · Empty your bladder often  Urinate and empty your bladder as soon as you feel the need  Do not hold your urine for long periods of time  · Wipe from front to back after you urinate or have a bowel movement  This will help prevent germs from getting into your urinary tract through your urethra  · Drink liquids as directed  Ask how much liquid to drink each day and which liquids are best for you   You may need to drink more liquids than usual to help flush out the bacteria  Do not drink alcohol, caffeine, or citrus juices  These can irritate your bladder and increase your symptoms  Your healthcare provider may recommend cranberry juice to help prevent a UTI  · Urinate after you have sex  This can help flush out bacteria passed during sex  · Do not douche or use feminine deodorants  These can change the chemical balance in your vagina  · Change sanitary pads or tampons often  This will help prevent germs from getting into your urinary tract  · Do pelvic muscle exercises often  Pelvic muscle exercises may help you start and stop urinating  Strong pelvic muscles may help you empty your bladder easier  Squeeze these muscles tightly for 5 seconds like you are trying to hold back urine  Then relax for 5 seconds  Gradually work up to squeezing for 10 seconds  Do 3 sets of 15 repetitions a day, or as directed  © 2017 [facility] [facility] Information is for End User's use only and may not be sold, redistributed or otherwise used for commercial purposes  All illustrations and images included in CareNotes® are the copyrighted property of [employer]  or [employer]  The above information is an  only  It is not intended as medical advice for individual conditions or treatments  Talk to your doctor, nurse or pharmacist before following any medical regimen to see if it is safe and effective for you

## 2018-01-12 LAB
BACTERIA UR CULT: ABNORMAL
BACTERIA UR CULT: ABNORMAL

## 2018-01-19 ENCOUNTER — ALLSCRIPTS OFFICE VISIT (OUTPATIENT)
Dept: OTHER | Facility: OTHER | Age: 43
End: 2018-01-19

## 2018-01-20 NOTE — PROGRESS NOTES
Assessment   1  Greater trochanteric bursitis, left (726 5) (B27 29)    Plan   Greater trochanteric bursitis, left    · Meloxicam 15 MG Oral Tablet; TAKE 1 TABLET DAILY AS NEEDED   · Joint Bursa Aspiration &/or Injection Major - POC; Status:Complete;   Done: [id number]   · Follow-up PRN Evaluation and Treatment  Follow-up  Status: Complete  Done:    [id number]   · Apply an ice pack 4 times a day for 20 minutes the first 2 days ; Status:Complete;   Done:    [id number]   · Stretches for IT band every 1-2 hours, holding the stretch for 30 seconds at minimum at    the point of pain  Please refer to handout given as to the specific stretches ;    Status:Complete;   Done: [id number]   · What is a corticosteroid?; Status:Complete;   Done: [id number]    Discussion/Summary      20-year-old female with left trochanteric bursitis  She was counseled on stretching exercises and given a cortisone injection  Please refer to the procedure note  She will follow up with me as needed  documentation was recorded using voice recognition software  Chief Complaint   Left hip pain      History of Present Illness   HPI: 20-year-old female with pain in her left hip  This has been going on now for a few weeks  It has been achy  She went to urgent care one point because it was so bad  She started limping  It is much worse at night  She has pain that goes all the way down into the knee now  Tramadol give her a little bit of relief  She takes some ibuprofen as well  She has had no therapy or injections for it  There was no specific injury that seemed to trigger this  She Is a  and is working out implementing [employer] with [facility]  patient's medical intake form was reviewed      Review of Systems        Constitutional: No fever, no chills, feels well, no tiredness, no recent weight gain or loss  Eyes: No complaints of eyesight problems, no red eyes  ENT: no loss of hearing, no nosebleeds, no sore throat  Cardiovascular: No complaints of chest pain, no palpitations, no leg claudication or lower extremity edema  Respiratory: no compliants of shortness of breath, no wheezing, no cough  Gastrointestinal: no complaints of abdominal pain, no constipation, no nausea or diarrhea, no vomiting, no bloody stools  Genitourinary: no complaints of dysuria, no incontinence  Musculoskeletal: as noted in HPI  Integumentary: no complaints of skin rash or lesion, no itching or dry skin, no skin wounds  Neurological: no complaints of headache, no confusion, no numbness or tingling, no dizziness  Endocrine: No complaints of muscle weakness, no feelings of weakness, no frequent urination, no excessive thirst       Psychiatric: No suicidal thoughts, no anxiety, no feelings of depression  ROS reviewed  Active Problems   1  Diabetes mellitus (250 00) (E11 9)   2  Hypertension (401 9) (I10)    Past Medical History      The active problems and past medical history were reviewed and updated today  Surgical History      The surgical history was reviewed and updated today  Family History   Mother    · Family history of hypertension (V17 49) (Z82 49)     The family history was reviewed and updated today  Social History    · Never a smoker  The social history was reviewed and updated today  Current Meds    1  Atorvastatin Calcium TABS; TAKE 1 TABLET DAILY; Therapy: (Recorded:19Jan2018) to Recorded   2  Carvedilol 12 5 MG Oral Tablet; TAKE 1 TABLET TWICE DAILY; Therapy: (Recorded:19Jan2018) to Recorded   3  Eliquis 5 MG Oral Tablet; Take 1 tablet daily; Therapy: (Recorded:19Jan2018) to Recorded   4  Lisinopril 10 MG Oral Tablet; Take 1 tablet twice daily; Therapy: (Recorded:19Jan2018) to Recorded   5  MetFORMIN HCl - 500 MG Oral Tablet; Take 1 tablet twice daily; Therapy: (Recorded:19Jan2018) to Recorded     The medication list was reviewed and updated today  Allergies   1  Cipro TABS    Vitals   Signs   Heart Rate: 517  Systolic: 366  Diastolic: 81  Height: 5 ft 6 in  Weight: 235 lb   BMI Calculated: 37 93  BSA Calculated: 2 14    Physical Exam         Right Hip:      Inspection and Palpation: Normal       ROM: Normal       Strength: Normal       Stability: Normal     Left Hip: Appearance: Normal  Tenderness: greater trochanter and bursa  ROM: Full  Internal rotation: which was painless  External rotation: which was painless  Motor: Normal  Special Tests: negative impingement test       Constitutional - General appearance: Normal       Musculoskeletal - Gait and station: Abnormal  Gait evaluation demonstrated limping on the right  -- Muscle strength/tone: Normal -- Lower extremity compartments: Normal       Cardiovascular - Pulses: Normal       Skin - Skin and subcutaneous tissue: Normal       Neurologic - Sensation: Normal -- Lower extremity peripheral neuro exam: Normal       Psychiatric - Orientation to person, place, and time: Normal -- Mood and affect: Normal       Results/Data   I personally reviewed the films/images/results in the office today  My interpretation follows  X-ray Review x-rays show some mild osteoarthritis of the left hip  Procedure   A lefttrochanteric bursa injection was performed:Treatment options were discussed with the patient, and the patient wished to proceed with a trochanteric bursa injection  Risk and benefits were discussed which included but were not limited to infection, incomplete resolution of symptoms, and bleeding  Verbal consent was obtained  After a Betadine prep, 9 cc of lidocaine and 6 mg of betamethasone were injected into the troch bursa from laterally over the most tender area with a 22-guage needle  No local anesthesia was used  A Band-Aid was placed  Patient tolerated the procedure well  They will followup as directed        Signatures    Electronically signed by : Keaton Garcia MD; Jan 19 2018 12:56PM EST (Author)

## 2018-01-22 ENCOUNTER — HOSPITAL ENCOUNTER (EMERGENCY)
Facility: HOSPITAL | Age: 43
Discharge: HOME | End: 2018-01-23
Attending: EMERGENCY MEDICINE | Admitting: EMERGENCY MEDICINE
Payer: COMMERCIAL

## 2018-01-22 DIAGNOSIS — N12 PYELONEPHRITIS: Primary | ICD-10-CM

## 2018-01-22 LAB
BACTERIA UR QL AUTO: ABNORMAL /HPF
BASOPHILS # BLD AUTO: 0.02 THOUSANDS/ΜL (ref 0–0.1)
BASOPHILS NFR BLD AUTO: 0 % (ref 0–1)
BILIRUB UR QL STRIP: NEGATIVE
CLARITY UR: CLEAR
COLOR UR: YELLOW
EOSINOPHIL # BLD AUTO: 0.15 THOUSAND/ΜL (ref 0–0.61)
EOSINOPHIL NFR BLD AUTO: 2 % (ref 0–6)
ERYTHROCYTE [DISTWIDTH] IN BLOOD BY AUTOMATED COUNT: 14 % (ref 11.6–15.1)
EXT PREG TEST URINE: NEGATIVE
GLUCOSE UR STRIP-MCNC: NEGATIVE MG/DL
HCT VFR BLD AUTO: 34.5 % (ref 34.8–46.1)
HGB BLD-MCNC: 12 G/DL (ref 11.5–15.4)
HGB UR QL STRIP.AUTO: ABNORMAL
HYALINE CASTS #/AREA URNS LPF: ABNORMAL /LPF
KETONES UR STRIP-MCNC: NEGATIVE MG/DL
LEUKOCYTE ESTERASE UR QL STRIP: ABNORMAL
LYMPHOCYTES # BLD AUTO: 2.91 THOUSANDS/ΜL (ref 0.6–4.47)
LYMPHOCYTES NFR BLD AUTO: 35 % (ref 14–44)
MCH RBC QN AUTO: 24.3 PG (ref 26.8–34.3)
MCHC RBC AUTO-ENTMCNC: 34.8 G/DL (ref 31.4–37.4)
MCV RBC AUTO: 70 FL (ref 82–98)
MONOCYTES # BLD AUTO: 0.54 THOUSAND/ΜL (ref 0.17–1.22)
MONOCYTES NFR BLD AUTO: 6 % (ref 4–12)
NEUTROPHILS # BLD AUTO: 4.77 THOUSANDS/ΜL (ref 1.85–7.62)
NEUTS SEG NFR BLD AUTO: 57 % (ref 43–75)
NITRITE UR QL STRIP: NEGATIVE
NON-SQ EPI CELLS URNS QL MICRO: ABNORMAL /HPF
NRBC BLD AUTO-RTO: 0 /100 WBCS
PH UR STRIP.AUTO: 6 [PH] (ref 4.5–8)
PLATELET # BLD AUTO: 240 THOUSANDS/UL (ref 149–390)
PMV BLD AUTO: 9.6 FL (ref 8.9–12.7)
PROT UR STRIP-MCNC: ABNORMAL MG/DL
RBC # BLD AUTO: 4.93 MILLION/UL (ref 3.81–5.12)
RBC #/AREA URNS AUTO: ABNORMAL /HPF
SP GR UR STRIP.AUTO: 1.02 (ref 1–1.03)
UROBILINOGEN UR QL STRIP.AUTO: 0.2 E.U./DL
WBC # BLD AUTO: 8.43 THOUSAND/UL (ref 4.31–10.16)
WBC #/AREA URNS AUTO: ABNORMAL /HPF

## 2018-01-22 PROCEDURE — 81001 URINALYSIS AUTO W/SCOPE: CPT

## 2018-01-22 PROCEDURE — 80053 COMPREHEN METABOLIC PANEL: CPT | Performed by: EMERGENCY MEDICINE

## 2018-01-22 PROCEDURE — 81025 URINE PREGNANCY TEST: CPT | Performed by: EMERGENCY MEDICINE

## 2018-01-22 PROCEDURE — 36415 COLL VENOUS BLD VENIPUNCTURE: CPT | Performed by: EMERGENCY MEDICINE

## 2018-01-22 PROCEDURE — 85025 COMPLETE CBC W/AUTO DIFF WBC: CPT | Performed by: EMERGENCY MEDICINE

## 2018-01-23 ENCOUNTER — APPOINTMENT (EMERGENCY)
Dept: RADIOLOGY | Facility: HOSPITAL | Age: 43
End: 2018-01-23
Payer: COMMERCIAL

## 2018-01-23 VITALS
HEART RATE: 102 BPM | SYSTOLIC BLOOD PRESSURE: 125 MMHG | BODY MASS INDEX: 37.77 KG/M2 | WEIGHT: 235 LBS | DIASTOLIC BLOOD PRESSURE: 81 MMHG | HEIGHT: 66 IN

## 2018-01-23 VITALS
SYSTOLIC BLOOD PRESSURE: 126 MMHG | DIASTOLIC BLOOD PRESSURE: 71 MMHG | BODY MASS INDEX: 37.93 KG/M2 | RESPIRATION RATE: 16 BRPM | OXYGEN SATURATION: 98 % | HEART RATE: 90 BPM | WEIGHT: 235 LBS | TEMPERATURE: 98 F

## 2018-01-23 LAB
ALBUMIN SERPL BCP-MCNC: 3.4 G/DL (ref 3.5–5)
ALP SERPL-CCNC: 64 U/L (ref 46–116)
ALT SERPL W P-5'-P-CCNC: 21 U/L (ref 12–78)
ANION GAP SERPL CALCULATED.3IONS-SCNC: 8 MMOL/L (ref 4–13)
AST SERPL W P-5'-P-CCNC: 20 U/L (ref 5–45)
BILIRUB SERPL-MCNC: 0.66 MG/DL (ref 0.2–1)
BUN SERPL-MCNC: 19 MG/DL (ref 5–25)
CALCIUM SERPL-MCNC: 9.3 MG/DL (ref 8.3–10.1)
CHLORIDE SERPL-SCNC: 103 MMOL/L (ref 100–108)
CO2 SERPL-SCNC: 24 MMOL/L (ref 21–32)
CREAT SERPL-MCNC: 0.93 MG/DL (ref 0.6–1.3)
GFR SERPL CREATININE-BSD FRML MDRD: 86 ML/MIN/1.73SQ M
GLUCOSE SERPL-MCNC: 191 MG/DL (ref 65–140)
POTASSIUM SERPL-SCNC: 4.6 MMOL/L (ref 3.5–5.3)
PROT SERPL-MCNC: 7.7 G/DL (ref 6.4–8.2)
SODIUM SERPL-SCNC: 135 MMOL/L (ref 136–145)

## 2018-01-23 PROCEDURE — 74176 CT ABD & PELVIS W/O CONTRAST: CPT

## 2018-01-23 PROCEDURE — 99284 EMERGENCY DEPT VISIT MOD MDM: CPT

## 2018-01-23 PROCEDURE — 96372 THER/PROPH/DIAG INJ SC/IM: CPT

## 2018-01-23 RX ORDER — CEPHALEXIN 250 MG/1
500 CAPSULE ORAL ONCE
Status: COMPLETED | OUTPATIENT
Start: 2018-01-23 | End: 2018-01-23

## 2018-01-23 RX ORDER — CEPHALEXIN 250 MG/1
500 CAPSULE ORAL EVERY 6 HOURS SCHEDULED
Qty: 112 CAPSULE | Refills: 0 | Status: SHIPPED | OUTPATIENT
Start: 2018-01-23 | End: 2018-02-11 | Stop reason: ALTCHOICE

## 2018-01-23 RX ADMIN — HYDROMORPHONE HYDROCHLORIDE 1 MG: 1 INJECTION, SOLUTION INTRAMUSCULAR; INTRAVENOUS; SUBCUTANEOUS at 00:42

## 2018-01-23 RX ADMIN — CEPHALEXIN 500 MG: 250 CAPSULE ORAL at 02:02

## 2018-01-23 NOTE — DISCHARGE INSTRUCTIONS
Kidney Infection   WHAT YOU NEED TO KNOW:   A kidney infection, or pyelonephritis, is a bacterial infection  The infection usually starts in your bladder or urethra and moves into your kidney  One or both kidneys may be infected  DISCHARGE INSTRUCTIONS:   Return to the emergency department if:   · You have a fever and chills  · You cannot stop vomiting  · You have severe pain in your abdomen, lower back, or sides  Contact your healthcare provider if:   · You continue to have a fever after you take antibiotics for 3 days  · You have pain when you urinate, even after treatment  · Your signs and symptoms return  · You have questions or concerns about your condition or care  Medicines: You may  need any of the following:  · Antibiotics  treat your bacterial infection  · Acetaminophen  decreases pain and fever  It is available without a doctor's order  Ask how much to take and how often to take it  Follow directions  Read the labels of all other medicines you are using to see if they also contain acetaminophen, or ask your doctor or pharmacist  Acetaminophen can cause liver damage if not taken correctly  Do not use more than 4 grams (4,000 milligrams) total of acetaminophen in one day  · NSAIDs , such as ibuprofen, help decrease swelling, pain, and fever  This medicine is available with or without a doctor's order  NSAIDs can cause stomach bleeding or kidney problems in certain people  If you take blood thinner medicine, always ask if NSAIDs are safe for you  Always read the medicine label and follow directions  Do not give these medicines to children under 10months of age without direction from your child's healthcare provider  · Prescription pain medicine  may be given  Ask how to take this medicine safely  · Take your medicine as directed  Contact your healthcare provider if you think your medicine is not helping or if you have side effects   Tell him of her if you are allergic to any medicine  Keep a list of the medicines, vitamins, and herbs you take  Include the amounts, and when and why you take them  Bring the list or the pill bottles to follow-up visits  Carry your medicine list with you in case of an emergency  Drink liquids as directed: You may need to drink extra liquids to help flush your kidneys and urinary system  Water is the best liquid to drink  Ask your healthcare provider how much liquid to drink each day and which liquids are best for you  Urinate as soon as you feel the urge: This will help flush bacteria from your urinary system  Do not wait or hold your urine for too long  Clean your genital area every day with soap and water:  Wipe from front to back after you urinate or have a bowel movement  Wear cotton underwear  Fabrics such as nylon and polyester can stay damp  This can increase your risk for infection  Urinate within 15 minutes after you have sex  Follow up with your healthcare provider as directed:  Write down your questions so you remember to ask them during your visits  © 2017 [facility] Information is for End User's use only and may not be sold, redistributed or otherwise used for commercial purposes  All illustrations and images included in CareNotes® are the copyrighted property of A D A M , Inc  or Stephon Prince  The above information is an  only  It is not intended as medical advice for individual conditions or treatments  Talk to your doctor, nurse or pharmacist before following any medical regimen to see if it is safe and effective for you

## 2018-01-23 NOTE — ED ATTENDING ATTESTATION
Genie Arizmendi DO, saw and evaluated the patient  I have discussed the patient with the resident/non-physician practitioner and agree with the resident's/non-physician practitioner's findings, Plan of Care, and MDM as documented in the resident's/non-physician practitioner's note, except where noted  All available labs and Radiology studies were reviewed  At this point I agree with the current assessment done in the Emergency Department  I have conducted an independent evaluation of this patient a history and physical is as follows:    [de-identified] for old female complaining of lower abdominal cramping right-sided flank pain and dysuria x2 weeks  Patient had been treated previously for a urinary tract infection with similar symptoms with nitrofurantoin  She denies any vaginal bleeding cramping spotting or discharge  Denies any blood in her urine  She states he has had frequently had urinary infections sided progress upon 1st the past but has had no previous urological evaluation  Labs and urinalysis reviewed, concern for possible kidney stone of the patient is on her menses which could account for the blood in the urine, CT reviewed with no evidence for kidney stone or pyelonephritis  Given bacteria, white cells in the urine and symptomatology, treat with antibiotics, Urology follow-up, return if condition worsens        Critical Care Time  CritCare Time    Procedures

## 2018-01-23 NOTE — ED PROVIDER NOTES
History  Chief Complaint   Patient presents with    Possible UTI     pt has been treated for UTI's and tonight states that the pain is getting worse  pt states that she ahs had kidney infections before and she feels like the UTI is turining into a kidney infection      64-year-old female presenting to the ER today with a chief complaint of lower abdominal cramping, right-sided flank pain and dysuria for 2 weeks  Patient was recently treated for a UTI with nitrofurantoin but symptoms have nonetheless persisted  When symptoms persisted patient came to the ER today for further evaluation and treatment  Denies fevers, chills  Denies any vaginal bleeding or discharge  Denies any blood in her urine  Frequently has urinary tract infections that have progressed to pyelonephritis in the past         History provided by:  Patient   used: No    Flank Pain   Pain location:  L flank  Pain quality: aching    Pain radiates to:  Does not radiate  Pain severity:  Mild  Onset quality:  Gradual  Timing:  Constant  Progression:  Worsening  Chronicity:  New  Relieved by:  Nothing  Worsened by:  Nothing  Ineffective treatments:  None tried      Prior to Admission Medications   Prescriptions Last Dose Informant Patient Reported? Taking?    apixaban (ELIQUIS) 5 mg   Yes Yes   Sig: Take 5 mg by mouth 2 (two) times a day   aspirin (ECOTRIN LOW STRENGTH) 81 mg EC tablet   Yes Yes   Sig: Take 81 mg by mouth daily   atorvastatin (LIPITOR) 10 mg tablet   Yes Yes   Sig: Take 40 mg by mouth daily     carvedilol (COREG) 12 5 mg tablet   Yes Yes   Sig: Take 12 5 mg by mouth 2 (two) times a day with meals   lisinopril (ZESTRIL) 10 mg tablet   Yes Yes   Sig: Take 10 mg by mouth daily   metFORMIN (GLUCOPHAGE-XR) 500 mg 24 hr tablet   Yes Yes   Sig: Take by mouth 2 (two) times a day with meals   nitroglycerin (NITROSTAT) 0 4 mg SL tablet   Yes Yes   Sig: Place 0 4 mg under the tongue every 5 (five) minutes as needed for chest pain   omeprazole (PriLOSEC) 40 MG capsule   Yes Yes   Sig: Take 40 mg by mouth daily      Facility-Administered Medications: None       Past Medical History:   Diagnosis Date    Cardiac disease     Diabetes mellitus ([facility] )     Hypertension     Pulmonary embolism ([facility] )     Stomach ulcer        History reviewed  No pertinent surgical history  History reviewed  No pertinent family history  I have reviewed and agree with the history as documented  Social History   Substance Use Topics    Smoking status: Never Smoker    Smokeless tobacco: Never Used    Alcohol use No      Comment: rarely        Review of Systems   Genitourinary: Positive for flank pain  Physical Exam  ED Triage Vitals   Temperature Pulse Respirations Blood Pressure SpO2   01/22/18 1911 01/22/18 1911 01/22/18 1911 01/22/18 1911 01/22/18 1911   98 °F (36 7 °C) 88 18 133/83 98 %      Temp Source Heart Rate Source Patient Position - Orthostatic VS BP Location FiO2 (%)   01/22/18 1911 01/22/18 1911 01/22/18 1911 01/22/18 1911 --   Oral Monitor Sitting Left arm       Pain Score       01/23/18 0115       1           Orthostatic Vital Signs  Vitals:    01/22/18 1911 01/22/18 2250 01/23/18 0045 01/23/18 0115   BP: 133/83 146/75 126/81 124/73   Pulse: 88 90 92 94   Patient Position - Orthostatic VS: Sitting Lying         Physical Exam   Constitutional: She is oriented to person, place, and time  She appears well-developed and well-nourished  HENT:   Head: Normocephalic and atraumatic  Right Ear: External ear normal    Left Ear: External ear normal    Nose: Nose normal    Mouth/Throat: Oropharynx is clear and moist    Eyes: Conjunctivae and EOM are normal  Pupils are equal, round, and reactive to light  Neck: Normal range of motion  Neck supple  No JVD present  No tracheal deviation present  No thyromegaly present  Cardiovascular: Normal rate, regular rhythm, normal heart sounds and intact distal pulses    Exam reveals no gallop and no friction rub  No murmur heard  Pulmonary/Chest: Effort normal and breath sounds normal  No stridor  No respiratory distress  She has no wheezes  She has no rales  She exhibits no tenderness  Abdominal: Soft  Bowel sounds are normal  She exhibits no distension and no mass  There is tenderness  There is no rebound and no guarding  No hernia  Musculoskeletal: Normal range of motion  She exhibits no edema, tenderness or deformity  Lymphadenopathy:     She has no cervical adenopathy  Neurological: She is alert and oriented to person, place, and time  She has normal reflexes  She displays normal reflexes  No cranial nerve deficit  She exhibits normal muscle tone  Coordination normal    Skin: Skin is warm  No rash noted  No erythema  No pallor  Psychiatric: She has a normal mood and affect  Her behavior is normal  Judgment and thought content normal    Nursing note and vitals reviewed  ED Medications  Medications   cephalexin (KEFLEX) capsule 500 mg (not administered)   HYDROmorphone (DILAUDID) injection 1 mg (1 mg Intramuscular Given 1/23/18 0042)       Diagnostic Studies  Results Reviewed     Procedure Component Value Units Date/Time    Comprehensive metabolic panel [[id number]]  (Abnormal) Collected:  01/22/18 2349    Lab Status:  Final result Specimen:  Blood from Arm, Right Updated:  01/23/18 0019     Sodium 135 (L) mmol/L      Potassium 4 6 mmol/L      Chloride 103 mmol/L      CO2 24 mmol/L      Anion Gap 8 mmol/L      BUN 19 mg/dL      Creatinine 0 93 mg/dL      Glucose 191 (H) mg/dL      Calcium 9 3 mg/dL      AST 20 U/L      ALT 21 U/L      Alkaline Phosphatase 64 U/L      Total Protein 7 7 g/dL      Albumin 3 4 (L) g/dL      Total Bilirubin 0 66 mg/dL      eGFR 86 ml/min/1 73sq m     Narrative:         National Kidney Disease Education Program recommendations are as follows:  GFR calculation is accurate only with a steady state creatinine  Chronic Kidney disease less than 60 ml/min/1 73 sq  meters  Kidney failure less than 15 ml/min/1 73 sq  meters      CBC and differential [[id number]]  (Abnormal) Collected:  01/22/18 2349    Lab Status:  Final result Specimen:  Blood from Arm, Right Updated:  01/22/18 2358     WBC 8 43 Thousand/uL      RBC 4 93 Million/uL      Hemoglobin 12 0 g/dL      Hematocrit 34 5 (L) %      MCV 70 (L) fL      MCH 24 3 (L) pg      MCHC 34 8 g/dL      RDW 14 0 %      MPV 9 6 fL      Platelets 124 Thousands/uL      nRBC 0 /100 WBCs      Neutrophils Relative 57 %      Lymphocytes Relative 35 %      Monocytes Relative 6 %      Eosinophils Relative 2 %      Basophils Relative 0 %      Neutrophils Absolute 4 77 Thousands/µL      Lymphocytes Absolute 2 91 Thousands/µL      Monocytes Absolute 0 54 Thousand/µL      Eosinophils Absolute 0 15 Thousand/µL      Basophils Absolute 0 02 Thousands/µL     Urine Microscopic [[id number]]  (Abnormal) Collected:  01/22/18 2249    Lab Status:  Final result Specimen:  Urine from Urine, Clean Catch Updated:  01/22/18 2301     RBC, UA Innumerable (A) /hpf      WBC, UA 4-10 (A) /hpf      Epithelial Cells Occasional /hpf      Bacteria, UA Occasional /hpf      Hyaline Casts, UA None Seen /lpf     POCT pregnancy, urine [[id number]]  (Normal) Resulted:  01/22/18 2248    Lab Status:  Final result Updated:  01/22/18 2248     EXT PREG TEST UR (Ref: Negative) negative    ED Urine Macroscopic [[id number]]  (Abnormal) Collected:  01/22/18 2249    Lab Status:  Final result Specimen:  Urine Updated:  01/22/18 2246     Color, UA Yellow     Clarity, UA Clear     pH, UA 6 0     Leukocytes, UA Trace (A)     Nitrite, UA Negative     Protein,  (2+) (A) mg/dl      Glucose, UA Negative mg/dl      Ketones, UA Negative mg/dl      Urobilinogen, UA 0 2 E U /dl      Bilirubin, UA Negative     Blood, UA Large (A)     Specific [place], UA 1 025    Narrative:       CLINITEK RESULT    POCT urinalysis dipstick [[id number]]     Lab Status:  No result Specimen:  Urine                  CT renal stone study abdomen pelvis without contrast    (Results Pending)         Procedures  Procedures      Phone Consults  ED Phone Contact    ED Course  ED Course as of Jan 23 0150   Mon Jan 22, 2018   2247 Leukocytes, UA: (!) Trace   2247 Blood, UA: (!) Large   2302 Bacteria, UA: Occasional                               MDM  Number of Diagnoses or Management Options  Pyelonephritis: new and requires workup     Amount and/or Complexity of Data Reviewed  Clinical lab tests: ordered and reviewed  Tests in the radiology section of CPT®: ordered and reviewed  Tests in the medicine section of CPT®: reviewed and ordered  Decide to obtain previous medical records or to obtain history from someone other than the patient: yes  Independent visualization of images, tracings, or specimens: yes    Patient Progress  Patient progress: stable    CritCare Time    Disposition  Final diagnoses:   Pyelonephritis     Time reflects when diagnosis was documented in both MDM as applicable and the Disposition within this note     Time User Action Codes Description Comment    1/23/2018  1:43 AM Gaston Casiano Add [N12] Pyelonephritis       ED Disposition     ED Disposition Condition Comment    Discharge  Everrett Fair discharge to home/self care  Condition at discharge: Good        Follow-up Information     Follow up With Specialties Details Why Contact Max Burns  Schedule an appointment as soon as possible for a visit  Bran Bearden  [phone]          Patient's Medications   Discharge Prescriptions    CEPHALEXIN (KEFLEX) 250 MG CAPSULE    Take 2 capsules by mouth every 6 (six) hours for 14 days       Start Date: 1/23/2018 End Date: 2/6/2018       Order Dose: 500 mg       Quantity: 112 capsule    Refills: 0     No discharge procedures on file  ED Provider  Attending physically available and evaluated Everrett Fair  I managed the patient along with the ED Attending      Electronically Signed by         Sangeeta Morris,   01/23/18 0150

## 2018-02-11 ENCOUNTER — HOSPITAL ENCOUNTER (EMERGENCY)
Facility: HOSPITAL | Age: 43
Discharge: HOME | End: 2018-02-12
Attending: EMERGENCY MEDICINE | Admitting: EMERGENCY MEDICINE
Payer: COMMERCIAL

## 2018-02-11 DIAGNOSIS — N39.0 URINARY TRACT INFECTION: ICD-10-CM

## 2018-02-11 DIAGNOSIS — L29.9 PRURITUS: ICD-10-CM

## 2018-02-11 DIAGNOSIS — B37.3 VULVOVAGINAL CANDIDIASIS: ICD-10-CM

## 2018-02-11 DIAGNOSIS — N89.8 VAGINAL DISCHARGE: Primary | ICD-10-CM

## 2018-02-11 LAB
ALBUMIN SERPL BCP-MCNC: 3.1 G/DL (ref 3.5–5)
ALP SERPL-CCNC: 78 U/L (ref 46–116)
ALT SERPL W P-5'-P-CCNC: 23 U/L (ref 12–78)
ANION GAP SERPL CALCULATED.3IONS-SCNC: 8 MMOL/L (ref 4–13)
AST SERPL W P-5'-P-CCNC: 13 U/L (ref 5–45)
BACTERIA UR QL AUTO: ABNORMAL /HPF
BASOPHILS # BLD AUTO: 0.02 THOUSANDS/ΜL (ref 0–0.1)
BASOPHILS NFR BLD AUTO: 0 % (ref 0–1)
BILIRUB SERPL-MCNC: 0.47 MG/DL (ref 0.2–1)
BILIRUB UR QL STRIP: NEGATIVE
BUN SERPL-MCNC: 16 MG/DL (ref 5–25)
CALCIUM SERPL-MCNC: 8.8 MG/DL (ref 8.3–10.1)
CHLORIDE SERPL-SCNC: 100 MMOL/L (ref 100–108)
CLARITY UR: ABNORMAL
CO2 SERPL-SCNC: 26 MMOL/L (ref 21–32)
COLOR UR: YELLOW
CREAT SERPL-MCNC: 1.01 MG/DL (ref 0.6–1.3)
EOSINOPHIL # BLD AUTO: 0.43 THOUSAND/ΜL (ref 0–0.61)
EOSINOPHIL NFR BLD AUTO: 8 % (ref 0–6)
ERYTHROCYTE [DISTWIDTH] IN BLOOD BY AUTOMATED COUNT: 13.8 % (ref 11.6–15.1)
EXT PREG TEST URINE: NEGATIVE
GFR SERPL CREATININE-BSD FRML MDRD: 78 ML/MIN/1.73SQ M
GLUCOSE SERPL-MCNC: 351 MG/DL (ref 65–140)
GLUCOSE UR STRIP-MCNC: ABNORMAL MG/DL
HCT VFR BLD AUTO: 31.9 % (ref 34.8–46.1)
HGB BLD-MCNC: 11.2 G/DL (ref 11.5–15.4)
HGB UR QL STRIP.AUTO: ABNORMAL
HYALINE CASTS #/AREA URNS LPF: ABNORMAL /LPF
KETONES UR STRIP-MCNC: NEGATIVE MG/DL
LEUKOCYTE ESTERASE UR QL STRIP: ABNORMAL
LYMPHOCYTES # BLD AUTO: 2.15 THOUSANDS/ΜL (ref 0.6–4.47)
LYMPHOCYTES NFR BLD AUTO: 42 % (ref 14–44)
MCH RBC QN AUTO: 24.4 PG (ref 26.8–34.3)
MCHC RBC AUTO-ENTMCNC: 35.1 G/DL (ref 31.4–37.4)
MCV RBC AUTO: 70 FL (ref 82–98)
MONOCYTES # BLD AUTO: 0.25 THOUSAND/ΜL (ref 0.17–1.22)
MONOCYTES NFR BLD AUTO: 5 % (ref 4–12)
NEUTROPHILS # BLD AUTO: 2.27 THOUSANDS/ΜL (ref 1.85–7.62)
NEUTS SEG NFR BLD AUTO: 45 % (ref 43–75)
NITRITE UR QL STRIP: NEGATIVE
NON-SQ EPI CELLS URNS QL MICRO: ABNORMAL /HPF
NRBC BLD AUTO-RTO: 0 /100 WBCS
PH UR STRIP.AUTO: 5.5 [PH] (ref 4.5–8)
PLATELET # BLD AUTO: 225 THOUSANDS/UL (ref 149–390)
PMV BLD AUTO: 9.7 FL (ref 8.9–12.7)
POTASSIUM SERPL-SCNC: 4.1 MMOL/L (ref 3.5–5.3)
PROT SERPL-MCNC: 7.1 G/DL (ref 6.4–8.2)
PROT UR STRIP-MCNC: >=300 MG/DL
RBC # BLD AUTO: 4.59 MILLION/UL (ref 3.81–5.12)
RBC #/AREA URNS AUTO: ABNORMAL /HPF
SODIUM SERPL-SCNC: 134 MMOL/L (ref 136–145)
SP GR UR STRIP.AUTO: 1.02 (ref 1–1.03)
UROBILINOGEN UR QL STRIP.AUTO: 0.2 E.U./DL
WBC # BLD AUTO: 5.13 THOUSAND/UL (ref 4.31–10.16)
WBC #/AREA URNS AUTO: ABNORMAL /HPF

## 2018-02-11 PROCEDURE — 81025 URINE PREGNANCY TEST: CPT | Performed by: EMERGENCY MEDICINE

## 2018-02-11 PROCEDURE — 87077 CULTURE AEROBIC IDENTIFY: CPT

## 2018-02-11 PROCEDURE — 36415 COLL VENOUS BLD VENIPUNCTURE: CPT | Performed by: EMERGENCY MEDICINE

## 2018-02-11 PROCEDURE — 85025 COMPLETE CBC W/AUTO DIFF WBC: CPT | Performed by: EMERGENCY MEDICINE

## 2018-02-11 PROCEDURE — 87186 SC STD MICRODIL/AGAR DIL: CPT

## 2018-02-11 PROCEDURE — 80053 COMPREHEN METABOLIC PANEL: CPT | Performed by: EMERGENCY MEDICINE

## 2018-02-11 PROCEDURE — 87086 URINE CULTURE/COLONY COUNT: CPT

## 2018-02-11 PROCEDURE — 81001 URINALYSIS AUTO W/SCOPE: CPT

## 2018-02-11 RX ORDER — DIPHENHYDRAMINE HCL 25 MG
25 TABLET ORAL ONCE
Status: COMPLETED | OUTPATIENT
Start: 2018-02-11 | End: 2018-02-11

## 2018-02-11 RX ORDER — FLUCONAZOLE 150 MG/1
150 TABLET ORAL ONCE
Status: COMPLETED | OUTPATIENT
Start: 2018-02-12 | End: 2018-02-12

## 2018-02-11 RX ADMIN — DIPHENHYDRAMINE HCL 25 MG: 25 TABLET ORAL at 23:13

## 2018-02-12 VITALS
DIASTOLIC BLOOD PRESSURE: 82 MMHG | TEMPERATURE: 97.9 F | BODY MASS INDEX: 37.93 KG/M2 | OXYGEN SATURATION: 95 % | WEIGHT: 235 LBS | SYSTOLIC BLOOD PRESSURE: 136 MMHG | RESPIRATION RATE: 24 BRPM | HEART RATE: 104 BPM

## 2018-02-12 LAB
ATRIAL RATE: 107 BPM
DEPRECATED D DIMER PPP: 345 NG/ML (FEU) (ref 0–424)
P AXIS: 61 DEGREES
PR INTERVAL: 150 MS
QRS AXIS: 68 DEGREES
QRSD INTERVAL: 94 MS
QT INTERVAL: 304 MS
QTC INTERVAL: 405 MS
T WAVE AXIS: 46 DEGREES
VENTRICULAR RATE: 107 BPM

## 2018-02-12 PROCEDURE — 36415 COLL VENOUS BLD VENIPUNCTURE: CPT | Performed by: EMERGENCY MEDICINE

## 2018-02-12 PROCEDURE — 93005 ELECTROCARDIOGRAM TRACING: CPT

## 2018-02-12 PROCEDURE — 96361 HYDRATE IV INFUSION ADD-ON: CPT

## 2018-02-12 PROCEDURE — 85379 FIBRIN DEGRADATION QUANT: CPT | Performed by: EMERGENCY MEDICINE

## 2018-02-12 PROCEDURE — 96374 THER/PROPH/DIAG INJ IV PUSH: CPT

## 2018-02-12 PROCEDURE — 99284 EMERGENCY DEPT VISIT MOD MDM: CPT

## 2018-02-12 PROCEDURE — 93010 ELECTROCARDIOGRAM REPORT: CPT | Performed by: INTERNAL MEDICINE

## 2018-02-12 RX ORDER — NITROFURANTOIN 25; 75 MG/1; MG/1
100 CAPSULE ORAL 2 TIMES DAILY
Qty: 10 CAPSULE | Refills: 0 | Status: SHIPPED | OUTPATIENT
Start: 2018-02-12

## 2018-02-12 RX ADMIN — HYDROMORPHONE HYDROCHLORIDE 0.5 MG: 1 INJECTION, SOLUTION INTRAMUSCULAR; INTRAVENOUS; SUBCUTANEOUS at 00:43

## 2018-02-12 RX ADMIN — SODIUM CHLORIDE 1000 ML: 0.9 INJECTION, SOLUTION INTRAVENOUS at 00:44

## 2018-02-12 RX ADMIN — FLUCONAZOLE 150 MG: 150 TABLET ORAL at 00:43

## 2018-02-12 NOTE — ED PROVIDER NOTES
History  Chief Complaint   Patient presents with    Abdominal Pain     left flank pain recently diagnosed with kidney infection, now reports possible yeast infection and total body itching     Patient is a 38year-old female with a history of DM, obesity, cardiomyopathy, and multiple recent visits for UTI who presents with vaginal discharge and total-body itching  Patient was seen on 1/22 for UTI symptoms  CT was negative for signs of kidney stone or pyelonephritis; she was discharged with Keflex  She completed the Keflex course about 3 or 4 days ago  Her UTI symptoms are improving but not completely resolved  She denies current frequency or dysuria, but still has some mild left flank pain that is improved from in the recent past   She now complains of 1 week of white, thick, odorless vaginal discharge and itch  It is similar to past yeast infections  The patient denies a history of SCD and is in a monogamous sexual relationship with her   She is not worried about STDs  She also complains of 5 days total body itch without rash  She denies similar symptoms in the past   She is not currently taking any new medications  She denies immunocompromise or recent steroid use  Prior to Admission Medications   Prescriptions Last Dose Informant Patient Reported? Taking?    apixaban (ELIQUIS) 5 mg 2/11/2018 at Unknown time  Yes Yes   Sig: Take 5 mg by mouth 2 (two) times a day   aspirin (ECOTRIN LOW STRENGTH) 81 mg EC tablet 2/11/2018 at Unknown time  Yes Yes   Sig: Take 81 mg by mouth daily   atorvastatin (LIPITOR) 10 mg tablet 2/11/2018 at Unknown time  Yes Yes   Sig: Take 40 mg by mouth daily     carvedilol (COREG) 12 5 mg tablet 2/11/2018 at Unknown time  Yes Yes   Sig: Take 12 5 mg by mouth 2 (two) times a day with meals   lisinopril (ZESTRIL) 10 mg tablet 2/11/2018 at Unknown time  Yes Yes   Sig: Take 10 mg by mouth daily   metFORMIN (GLUCOPHAGE-XR) 500 mg 24 hr tablet 2/11/2018 at Unknown time Yes Yes   Sig: Take by mouth 2 (two) times a day with meals   nitroglycerin (NITROSTAT) 0 4 mg SL tablet More than a month at Unknown time  Yes No   Sig: Place 0 4 mg under the tongue every 5 (five) minutes as needed for chest pain   omeprazole (PriLOSEC) 40 MG capsule 2/11/2018 at Unknown time  Yes Yes   Sig: Take 40 mg by mouth daily      Facility-Administered Medications: None       Past Medical History:   Diagnosis Date    Cardiac disease     Diabetes mellitus ([place] [address] )     Hypertension     Pulmonary embolism ([place] [address] )     Stomach ulcer        History reviewed  No pertinent surgical history  History reviewed  No pertinent family history  I have reviewed and agree with the history as documented  Social History   Substance Use Topics    Smoking status: Never Smoker    Smokeless tobacco: Never Used    Alcohol use No      Comment: rarely        Review of Systems   Constitutional: Negative for chills, fatigue and fever  HENT: Negative for congestion and sore throat  Eyes: Negative for visual disturbance  Respiratory: Negative for cough and shortness of breath  Cardiovascular: Negative for chest pain  Gastrointestinal: Negative for abdominal pain, diarrhea, nausea and vomiting  Endocrine: Negative for polyuria  Genitourinary: Positive for flank pain and vaginal discharge  Negative for difficulty urinating, dysuria, frequency, hematuria and urgency  Musculoskeletal: Negative for arthralgias  Skin: Negative for rash  Allergic/Immunologic: Negative for immunocompromised state  Neurological: Negative for dizziness, light-headedness and headaches  All other systems reviewed and are negative        Physical Exam  ED Triage Vitals [02/11/18 2048]   Temperature Pulse Respirations Blood Pressure SpO2   97 9 °F (36 6 °C) (!) 113 18 155/96 97 %      Temp Source Heart Rate Source Patient Position - Orthostatic VS BP Location FiO2 (%)   Oral Monitor Sitting Left arm --      Pain Score       4 Orthostatic Vital Signs  Vitals:    02/11/18 2315 02/11/18 2340 02/12/18 0141 02/12/18 0215   BP: 145/88 150/98 136/82    Pulse: (!) 108 (!) 106 (!) 109 104   Patient Position - Orthostatic VS: Lying  Lying        Physical Exam   Constitutional: She is oriented to person, place, and time  She appears well-developed  No distress  Obese   HENT:   Head: Normocephalic and atraumatic  Eyes: Conjunctivae are normal  Pupils are equal, round, and reactive to light  Cardiovascular: Normal rate, regular rhythm and normal heart sounds  Pulmonary/Chest: Effort normal and breath sounds normal  No respiratory distress  Abdominal: Soft  Bowel sounds are normal  There is tenderness  There is CVA tenderness  Mild CVA tenderness to percussion   Musculoskeletal: Normal range of motion  Neurological: She is alert and oriented to person, place, and time  Skin: Skin is warm and dry  No rash noted  Psychiatric: She has a normal mood and affect  Nursing note and vitals reviewed        ED Medications  Medications   diphenhydrAMINE (BENADRYL) tablet 25 mg (25 mg Oral Given 2/11/18 2313)   fluconazole (DIFLUCAN) tablet 150 mg (150 mg Oral Given 2/12/18 0043)   sodium chloride 0 9 % bolus 1,000 mL (0 mL Intravenous Stopped 2/12/18 0215)   HYDROmorphone (DILAUDID) injection 0 5 mg (0 5 mg Intravenous Given 2/12/18 0043)       Diagnostic Studies  Results Reviewed     Procedure Component Value Units Date/Time    D-dimer, quantitative [[id number]]  (Normal) Collected:  02/12/18 0141    Lab Status:  Final result Specimen:  Blood from Arm, Right Updated:  02/12/18 0205     D-Dimer, Quant 345 ng/ml (FEU)     Comprehensive metabolic panel [[id number]]  (Abnormal) Collected:  02/11/18 2308    Lab Status:  Final result Specimen:  Blood from Arm, Right Updated:  02/11/18 2340     Sodium 134 (L) mmol/L      Potassium 4 1 mmol/L      Chloride 100 mmol/L      CO2 26 mmol/L      Anion Gap 8 mmol/L      BUN 16 mg/dL      Creatinine 1 01 mg/dL      Glucose 351 (H) mg/dL      Calcium 8 8 mg/dL      AST 13 U/L      ALT 23 U/L      Alkaline Phosphatase 78 U/L      Total Protein 7 1 g/dL      Albumin 3 1 (L) g/dL      Total Bilirubin 0 47 mg/dL      eGFR 78 ml/min/1 73sq m     Narrative:         National Kidney Disease Education Program recommendations are as follows:  GFR calculation is accurate only with a steady state creatinine  Chronic Kidney disease less than 60 ml/min/1 73 sq  meters  Kidney failure less than 15 ml/min/1 73 sq  meters  CBC and differential [[id number]]  (Abnormal) Collected:  02/11/18 2308    Lab Status:  Final result Specimen:  Blood from Arm, Right Updated:  02/11/18 2322     WBC 5 13 Thousand/uL      RBC 4 59 Million/uL      Hemoglobin 11 2 (L) g/dL      Hematocrit 31 9 (L) %      MCV 70 (L) fL      MCH 24 4 (L) pg      MCHC 35 1 g/dL      RDW 13 8 %      MPV 9 7 fL      Platelets 675 Thousands/uL      nRBC 0 /100 WBCs      Neutrophils Relative 45 %      Lymphocytes Relative 42 %      Monocytes Relative 5 %      Eosinophils Relative 8 (H) %      Basophils Relative 0 %      Neutrophils Absolute 2 27 Thousands/µL      Lymphocytes Absolute 2 15 Thousands/µL      Monocytes Absolute 0 25 Thousand/µL      Eosinophils Absolute 0 43 Thousand/µL      Basophils Absolute 0 02 Thousands/µL     POCT pregnancy, urine [[id number]]  (Normal) Resulted:  02/11/18 2202    Lab Status:  Final result Updated:  02/11/18 2202     EXT PREG TEST UR (Ref: Negative) negative    Urine Microscopic [[id number]]  (Abnormal) Collected:  02/11/18 2132    Lab Status:  Final result Specimen:  Urine from Urine, Clean Catch Updated:  02/11/18 2153     RBC, UA None Seen /hpf      WBC, UA 20-30 (A) /hpf      Epithelial Cells Occasional /hpf      Bacteria, UA Occasional /hpf      Hyaline Casts, UA 3-5 (A) /lpf     Urine culture [[id number]] Collected:  02/11/18 2132    Lab Status:   In process Specimen:  Urine from Urine, Clean Catch Updated:  02/11/18 2153    ED Urine Macroscopic [[id number]]  (Abnormal) Collected:  02/11/18 2132    Lab Status:  Final result Specimen:  Urine Updated:  02/11/18 2127     Color, UA Yellow     Clarity, UA Slightly Cloudy     pH, UA 5 5     Leukocytes, UA Small (A)     Nitrite, UA Negative     Protein, UA >=300 (A) mg/dl      Glucose,  (1/4%) (A) mg/dl      Ketones, UA Negative mg/dl      Urobilinogen, UA 0 2 E U /dl      Bilirubin, UA Negative     Blood, UA Trace (A)     Specific [place], UA 1 025    Narrative:       CLINITEK RESULT                 No orders to display         Procedures  ECG 12 Lead Documentation  Date/Time: 2/12/2018 3:25 AM  Performed by: Ermias Quezada  Authorized by: Polo SAAB     Indications / Diagnosis:  Tachycardia  ECG reviewed by me, the ED Provider: yes    Patient location:  ED  Previous ECG:     Previous ECG:  Compared to current    Comparison ECG info:  1/9/18    Similarity:  No change    Comparison to cardiac monitor: No    Interpretation:     Interpretation: abnormal    Rate:     ECG rate:  107    ECG rate assessment: tachycardic    Rhythm:     Rhythm: sinus rhythm    Ectopy:     Ectopy: none    QRS:     QRS axis:  Normal  Conduction:     Conduction: normal    ST segments:     ST segments:  Normal  T waves:     T waves: non-specific            Phone Consults  ED Phone Contact    ED Course  ED Course                                MDM  Number of Diagnoses or Management Options  Pruritus:   Urinary tract infection:   Vaginal discharge:   Vulvovaginal candidiasis:   Diagnosis management comments: Patient is a 38year-old female with a history of DM, obesity, cardiomyopathy, and multiple recent visits for UTI who presents with vaginal discharge and total-body itching  Discharge consistent with vulvovaginal candidiasis  Will treat with diflucan PO x1  Patient began complaining of increasing left flank pain during her ED stay, so was treated with dilaudid x1 with improvement   Basic labs obtained to assess kidney function, check WBC count, and check liver function  Labs are unremarkable  Patient treated symptomatically for itching with benadryl and discharged with Urology f/u, return precautions  CritCare Time    Disposition  Final diagnoses:   Vaginal discharge   Pruritus   Vulvovaginal candidiasis   Urinary tract infection     Time reflects when diagnosis was documented in both MDM as applicable and the Disposition within this note     Time User Action Codes Description Comment    2/11/2018 10:30 PM Micah Majano Add [N89 8] Vaginal discharge     2/11/2018 10:30 PM Obinna Blackmon Add [L29 9] Pruritus     2/12/2018 12:04 AM Obinna Blackmon Add [B37 3] Vulvovaginal candidiasis     2/12/2018  2:13 AM Obinna Blackmon [N39 0] Urinary tract infection       ED Disposition     ED Disposition Condition Comment    Discharge  Abel Arredondo discharge to home/self care      Condition at discharge: Good        Follow-up Information     Follow up With Specialties Details Why [address]  Call in 1 day  [address] [facility],[address] [facility] Call in 1 day  [address] [facility] [address] [place] [id number]  [phone]        Discharge Medication List as of 2/12/2018  2:13 AM      START taking these medications    Details   nitrofurantoin (MACROBID) 100 mg capsule Take 1 capsule (100 mg total) by mouth 2 (two) times a day, Starting Mon 2/12/2018, Print         CONTINUE these medications which have NOT CHANGED    Details   apixaban (ELIQUIS) 5 mg Take 5 mg by mouth 2 (two) times a day, Historical Med      aspirin (ECOTRIN LOW STRENGTH) 81 mg EC tablet Take 81 mg by mouth daily, Historical Med      atorvastatin (LIPITOR) 10 mg tablet Take 40 mg by mouth daily  , Historical Med      carvedilol (COREG) 12 5 mg tablet Take 12 5 mg by mouth 2 (two) times a day with meals, Historical Med      lisinopril (ZESTRIL) 10 mg tablet Take 10 mg by mouth daily, Historical Med      metFORMIN (GLUCOPHAGE-XR) 500 mg 24 hr tablet Take by mouth 2 (two) times a day with meals, Historical Med      omeprazole (PriLOSEC) 40 MG capsule Take 40 mg by mouth daily, Historical Med      nitroglycerin (NITROSTAT) 0 4 mg SL tablet Place 0 4 mg under the tongue every 5 (five) minutes as needed for chest pain, Historical Med           No discharge procedures on file  ED Provider  Attending physically available and evaluated Jennifer Chaudhary I managed the patient along with the ED Attending      Electronically Signed by         Lisa Glover MD  02/12/18 6087

## 2018-02-12 NOTE — DISCHARGE INSTRUCTIONS
Vulvovaginal Candidiasis   WHAT YOU NEED TO KNOW:   Vulvovaginal candidiasis, or yeast infection, is a common vaginal infection  Vulvovaginal candidiasis is caused by a fungus, or yeast-like germ  Fungi are normally found in your vagina  When there are too many fungi, it can cause an infection  DISCHARGE INSTRUCTIONS:   Return to the emergency department if:   · You have fever and chills  · You are bleeding from your vagina and it is not your monthly period  · You develop abdominal or pelvic pain  Contact your healthcare provider if:   · Your signs and symptoms get worse, even after treatment  · You have questions or concerns about your condition or care  Medicines:   · Medicines  help treat the fungal infection and decrease inflammation  The medicine may be a pill, topical cream, or vaginal suppository  · Take your medicine as directed  Contact your healthcare provider if you think your medicine is not helping or if you have side effects  Tell him of her if you are allergic to any medicine  Keep a list of the medicines, vitamins, and herbs you take  Include the amounts, and when and why you take them  Bring the list or the pill bottles to follow-up visits  Carry your medicine list with you in case of an emergency  Manage your symptoms:   · Wear cotton underwear  · Keep the vaginal area clean and dry  · Do not have sex until your symptoms are gone  · Do not douche  · Do not use feminine hygiene sprays, powders, or bubble bath  Prevent another infection:   · Take showers instead of baths  · Eat yogurt  · Limit the amount of alcohol you drink'    · Control your blood sugar if you are diabetic  · Limit your time in hot tubs  Follow up with your healthcare provider as directed:  Write down your questions so you remember to ask them during your visits     © 2017 [employer] Information is for End User's use only and may not be sold, redistributed or otherwise used for commercial purposes  All illustrations and images included in CareNotes® are the copyrighted property of [employer]  or [address]  The above information is an  only  It is not intended as medical advice for individual conditions or treatments  Talk to your doctor, nurse or pharmacist before following any medical regimen to see if it is safe and effective for you

## 2018-02-12 NOTE — ED ATTENDING ATTESTATION
Janeth Chong MD, saw and evaluated the patient  I have discussed the patient with the resident/non-physician practitioner and agree with the resident's/non-physician practitioner's findings, Plan of Care, and MDM as documented in the resident's/non-physician practitioner's note, except where noted  All available labs and Radiology studies were reviewed  At this point I agree with the current assessment done in the Emergency Department  I have conducted an independent evaluation of this patient a history and physical is as follows:      Critical Care Time  CritCare Time  OA: 39 y/o f with h/o diabetes, CAD, PE who presents with   Pt recently treated for UTI with keflex however has also developed thick white vaginal dc and pruritis since using the abx  She also c/o persistent L flank pain that is unchanged x months  No fevers/chills  No n/v  No change in stools  Tolerating PO without difficulty  No CP/SOB  Of note, the patient has also had diffuse body pruritis x 1 week wtihout rash  No respiratory sxms  PE, well developed f in NAD, VSS, Nc/AT, MMM, clear sclera/conjunctiva, neck supple/FROM, RR, lungs CTAB, abd soft, NT/ND, +Bs, white vaginal dc adherant to wall, no CMT, no ttp,  -r/g, - palpable mass, - appreciable CVA ttp, - LE edema/calf ttp, + 2 distal pulses and capillary refill < 2 sec, AAOx3  A/p yeast infection with concern for UTI   IVF hydration, basic labs, u/a, re-eval     Fluids, re-eval, dispo pending at end of shift  Procedures

## 2018-02-14 LAB — BACTERIA UR CULT: ABNORMAL

## 2018-02-18 ENCOUNTER — EMERGENCY (EMERGENCY)
Facility: HOSPITAL | Age: 43
LOS: 1 days | Discharge: HOME | End: 2018-02-18
Attending: EMERGENCY MEDICINE
Payer: COMMERCIAL

## 2018-02-18 VITALS
RESPIRATION RATE: 16 BRPM | WEIGHT: 235.01 LBS | OXYGEN SATURATION: 98 % | TEMPERATURE: 98 F | HEIGHT: 66 IN | DIASTOLIC BLOOD PRESSURE: 95 MMHG | HEART RATE: 100 BPM | SYSTOLIC BLOOD PRESSURE: 135 MMHG

## 2018-02-18 VITALS
SYSTOLIC BLOOD PRESSURE: 128 MMHG | RESPIRATION RATE: 17 BRPM | TEMPERATURE: 98 F | OXYGEN SATURATION: 99 % | HEART RATE: 91 BPM | DIASTOLIC BLOOD PRESSURE: 88 MMHG

## 2018-02-18 DIAGNOSIS — Z90.49 ACQUIRED ABSENCE OF OTHER SPECIFIED PARTS OF DIGESTIVE TRACT: Chronic | ICD-10-CM

## 2018-02-18 LAB
ALBUMIN SERPL ELPH-MCNC: 3.3 G/DL — LOW (ref 3.5–5)
ALP SERPL-CCNC: 72 U/L — SIGNIFICANT CHANGE UP (ref 40–120)
ALT FLD-CCNC: 23 U/L DA — SIGNIFICANT CHANGE UP (ref 10–60)
ANION GAP SERPL CALC-SCNC: 9 MMOL/L — SIGNIFICANT CHANGE UP (ref 5–17)
APPEARANCE UR: CLEAR — SIGNIFICANT CHANGE UP
AST SERPL-CCNC: 44 U/L — HIGH (ref 10–40)
BACTERIA # UR AUTO: ABNORMAL /HPF
BILIRUB SERPL-MCNC: 0.9 MG/DL — SIGNIFICANT CHANGE UP (ref 0.2–1.2)
BILIRUB UR-MCNC: NEGATIVE — SIGNIFICANT CHANGE UP
BUN SERPL-MCNC: 22 MG/DL — HIGH (ref 7–18)
CALCIUM SERPL-MCNC: 8.9 MG/DL — SIGNIFICANT CHANGE UP (ref 8.4–10.5)
CHLORIDE SERPL-SCNC: 99 MMOL/L — SIGNIFICANT CHANGE UP (ref 96–108)
CO2 SERPL-SCNC: 21 MMOL/L — LOW (ref 22–31)
COLOR SPEC: YELLOW — SIGNIFICANT CHANGE UP
COMMENT - URINE: SIGNIFICANT CHANGE UP
CREAT SERPL-MCNC: 1.1 MG/DL — SIGNIFICANT CHANGE UP (ref 0.5–1.3)
DIFF PNL FLD: ABNORMAL
EPI CELLS # UR: SIGNIFICANT CHANGE UP /HPF
GLUCOSE SERPL-MCNC: 386 MG/DL — HIGH (ref 70–99)
GLUCOSE UR QL: 1000 MG/DL
HCT VFR BLD CALC: 41.8 % — SIGNIFICANT CHANGE UP (ref 34.5–45)
HGB BLD-MCNC: 13.2 G/DL — SIGNIFICANT CHANGE UP (ref 11.5–15.5)
KETONES UR-MCNC: NEGATIVE — SIGNIFICANT CHANGE UP
LEUKOCYTE ESTERASE UR-ACNC: ABNORMAL
MCHC RBC-ENTMCNC: 23.8 PG — LOW (ref 27–34)
MCHC RBC-ENTMCNC: 31.7 GM/DL — LOW (ref 32–36)
MCV RBC AUTO: 75.3 FL — LOW (ref 80–100)
NITRITE UR-MCNC: NEGATIVE — SIGNIFICANT CHANGE UP
PH UR: 6.5 — SIGNIFICANT CHANGE UP (ref 5–8)
PLATELET # BLD AUTO: 265 K/UL — SIGNIFICANT CHANGE UP (ref 150–400)
POTASSIUM SERPL-MCNC: 4.9 MMOL/L — SIGNIFICANT CHANGE UP (ref 3.5–5.3)
POTASSIUM SERPL-SCNC: 4.9 MMOL/L — SIGNIFICANT CHANGE UP (ref 3.5–5.3)
PROT SERPL-MCNC: 7.9 G/DL — SIGNIFICANT CHANGE UP (ref 6–8.3)
PROT UR-MCNC: 30 MG/DL
RBC # BLD: 5.56 M/UL — HIGH (ref 3.8–5.2)
RBC # FLD: 13.1 % — SIGNIFICANT CHANGE UP (ref 10.3–14.5)
RBC CASTS # UR COMP ASSIST: ABNORMAL /HPF (ref 0–2)
SODIUM SERPL-SCNC: 129 MMOL/L — LOW (ref 135–145)
SP GR SPEC: 1.01 — SIGNIFICANT CHANGE UP (ref 1.01–1.02)
UROBILINOGEN FLD QL: NEGATIVE — SIGNIFICANT CHANGE UP
WBC # BLD: 6.9 K/UL — SIGNIFICANT CHANGE UP (ref 3.8–10.5)
WBC # FLD AUTO: 6.9 K/UL — SIGNIFICANT CHANGE UP (ref 3.8–10.5)
WBC UR QL: ABNORMAL /HPF (ref 0–5)

## 2018-02-18 PROCEDURE — 81001 URINALYSIS AUTO W/SCOPE: CPT

## 2018-02-18 PROCEDURE — 96374 THER/PROPH/DIAG INJ IV PUSH: CPT

## 2018-02-18 PROCEDURE — 80053 COMPREHEN METABOLIC PANEL: CPT

## 2018-02-18 PROCEDURE — 76775 US EXAM ABDO BACK WALL LIM: CPT

## 2018-02-18 PROCEDURE — 99284 EMERGENCY DEPT VISIT MOD MDM: CPT

## 2018-02-18 PROCEDURE — 99284 EMERGENCY DEPT VISIT MOD MDM: CPT | Mod: 25

## 2018-02-18 PROCEDURE — 76775 US EXAM ABDO BACK WALL LIM: CPT | Mod: 26

## 2018-02-18 PROCEDURE — 96375 TX/PRO/DX INJ NEW DRUG ADDON: CPT

## 2018-02-18 PROCEDURE — 87086 URINE CULTURE/COLONY COUNT: CPT

## 2018-02-18 PROCEDURE — 85027 COMPLETE CBC AUTOMATED: CPT

## 2018-02-18 PROCEDURE — 87186 SC STD MICRODIL/AGAR DIL: CPT

## 2018-02-18 PROCEDURE — 82962 GLUCOSE BLOOD TEST: CPT

## 2018-02-18 RX ORDER — IBUPROFEN 200 MG
1 TABLET ORAL
Qty: 28 | Refills: 0 | OUTPATIENT
Start: 2018-02-18 | End: 2018-02-24

## 2018-02-18 RX ORDER — SODIUM CHLORIDE 9 MG/ML
1000 INJECTION INTRAMUSCULAR; INTRAVENOUS; SUBCUTANEOUS ONCE
Qty: 0 | Refills: 0 | Status: COMPLETED | OUTPATIENT
Start: 2018-02-18 | End: 2018-02-18

## 2018-02-18 RX ORDER — DIPHENHYDRAMINE HCL 50 MG
25 CAPSULE ORAL ONCE
Qty: 0 | Refills: 0 | Status: COMPLETED | OUTPATIENT
Start: 2018-02-18 | End: 2018-02-18

## 2018-02-18 RX ORDER — IBUPROFEN 200 MG
800 TABLET ORAL ONCE
Qty: 0 | Refills: 0 | Status: COMPLETED | OUTPATIENT
Start: 2018-02-18 | End: 2018-02-18

## 2018-02-18 RX ORDER — CEFTRIAXONE 500 MG/1
1 INJECTION, POWDER, FOR SOLUTION INTRAMUSCULAR; INTRAVENOUS ONCE
Qty: 0 | Refills: 0 | Status: COMPLETED | OUTPATIENT
Start: 2018-02-18 | End: 2018-02-18

## 2018-02-18 RX ADMIN — CEFTRIAXONE 100 GRAM(S): 500 INJECTION, POWDER, FOR SOLUTION INTRAMUSCULAR; INTRAVENOUS at 18:03

## 2018-02-18 RX ADMIN — SODIUM CHLORIDE 1000 MILLILITER(S): 9 INJECTION INTRAMUSCULAR; INTRAVENOUS; SUBCUTANEOUS at 17:39

## 2018-02-18 RX ADMIN — Medication 25 MILLIGRAM(S): at 17:36

## 2018-02-18 RX ADMIN — Medication 800 MILLIGRAM(S): at 17:36

## 2018-02-18 NOTE — ED PROVIDER NOTE - OBJECTIVE STATEMENT
41 y/o F pt w/ PMHx of AICD, CAD, DM, and cardiomyopathy presents to the ED c/o  urinary symptoms. Pt reports history of this problem in the past; symptoms have been consistent since December. Pt reports flank pain, frequency, dysuria, intermittent nausea, vaginal itching. Pt is sexually active. Denies fever, chills or any other complaints. Allergies: Cipro

## 2018-02-18 NOTE — ED PROVIDER NOTE - CARE PLAN
Principal Discharge DX:	Uncontrolled diabetes mellitus  Secondary Diagnosis:	UTI (urinary tract infection)

## 2018-02-18 NOTE — ED PROVIDER NOTE - MEDICAL DECISION MAKING DETAILS
Will obtain urine, urine culture, Clamydia and reassess. Will obtain urine, urine culture, Clamydia, ultrasound and reassess. Will obtain urine, urine culture, Clamydia, ultrasound and reassess.pt to call for reports of urine culture.must follow up pmd/urology/

## 2018-02-18 NOTE — ED ADULT TRIAGE NOTE - CHIEF COMPLAINT QUOTE
C/o I have been dealing with a UTI for over 2 months on/off its now coming back and I also had diarrhea since y/d and itching on/off to my body

## 2018-02-19 LAB
C TRACH RRNA SPEC QL NAA+PROBE: SIGNIFICANT CHANGE UP
N GONORRHOEA RRNA SPEC QL NAA+PROBE: SIGNIFICANT CHANGE UP
SPECIMEN SOURCE: SIGNIFICANT CHANGE UP

## 2018-02-27 ENCOUNTER — APPOINTMENT (OUTPATIENT)
Dept: UROGYNECOLOGY | Facility: CLINIC | Age: 43
End: 2018-02-27

## 2018-02-27 ENCOUNTER — EMERGENCY (EMERGENCY)
Facility: HOSPITAL | Age: 43
LOS: 1 days | Discharge: HOME | End: 2018-02-27
Attending: EMERGENCY MEDICINE | Admitting: EMERGENCY MEDICINE
Payer: COMMERCIAL

## 2018-02-27 VITALS
RESPIRATION RATE: 18 BRPM | SYSTOLIC BLOOD PRESSURE: 136 MMHG | OXYGEN SATURATION: 99 % | DIASTOLIC BLOOD PRESSURE: 94 MMHG | HEART RATE: 102 BPM | TEMPERATURE: 98 F

## 2018-02-27 DIAGNOSIS — Z90.49 ACQUIRED ABSENCE OF OTHER SPECIFIED PARTS OF DIGESTIVE TRACT: Chronic | ICD-10-CM

## 2018-02-27 LAB
ALBUMIN SERPL ELPH-MCNC: 4.1 G/DL — SIGNIFICANT CHANGE UP (ref 3.3–5)
ALP SERPL-CCNC: 62 U/L — SIGNIFICANT CHANGE UP (ref 40–120)
ALT FLD-CCNC: 14 U/L — SIGNIFICANT CHANGE UP (ref 4–33)
APPEARANCE UR: CLEAR — SIGNIFICANT CHANGE UP
AST SERPL-CCNC: 18 U/L — SIGNIFICANT CHANGE UP (ref 4–32)
BACTERIA # UR AUTO: SIGNIFICANT CHANGE UP
BASOPHILS # BLD AUTO: 0.03 K/UL — SIGNIFICANT CHANGE UP (ref 0–0.2)
BASOPHILS NFR BLD AUTO: 0.5 % — SIGNIFICANT CHANGE UP (ref 0–2)
BILIRUB SERPL-MCNC: 0.6 MG/DL — SIGNIFICANT CHANGE UP (ref 0.2–1.2)
BILIRUB UR-MCNC: NEGATIVE — SIGNIFICANT CHANGE UP
BLOOD UR QL VISUAL: NEGATIVE — SIGNIFICANT CHANGE UP
BUN SERPL-MCNC: 23 MG/DL — SIGNIFICANT CHANGE UP (ref 7–23)
CALCIUM SERPL-MCNC: 9.3 MG/DL — SIGNIFICANT CHANGE UP (ref 8.4–10.5)
CHLORIDE SERPL-SCNC: 101 MMOL/L — SIGNIFICANT CHANGE UP (ref 98–107)
CO2 SERPL-SCNC: 18 MMOL/L — LOW (ref 22–31)
COLOR SPEC: SIGNIFICANT CHANGE UP
CREAT SERPL-MCNC: 1.41 MG/DL — HIGH (ref 0.5–1.3)
EOSINOPHIL # BLD AUTO: 0.22 K/UL — SIGNIFICANT CHANGE UP (ref 0–0.5)
EOSINOPHIL NFR BLD AUTO: 3.6 % — SIGNIFICANT CHANGE UP (ref 0–6)
GLUCOSE SERPL-MCNC: 188 MG/DL — HIGH (ref 70–99)
GLUCOSE UR-MCNC: NEGATIVE — SIGNIFICANT CHANGE UP
HCT VFR BLD CALC: 35.6 % — SIGNIFICANT CHANGE UP (ref 34.5–45)
HGB BLD-MCNC: 12.5 G/DL — SIGNIFICANT CHANGE UP (ref 11.5–15.5)
HYALINE CASTS # UR AUTO: SIGNIFICANT CHANGE UP (ref 0–?)
IMM GRANULOCYTES # BLD AUTO: 0.01 # — SIGNIFICANT CHANGE UP
IMM GRANULOCYTES NFR BLD AUTO: 0.2 % — SIGNIFICANT CHANGE UP (ref 0–1.5)
KETONES UR-MCNC: NEGATIVE — SIGNIFICANT CHANGE UP
LEUKOCYTE ESTERASE UR-ACNC: NEGATIVE — SIGNIFICANT CHANGE UP
LIDOCAIN IGE QN: 41.3 U/L — SIGNIFICANT CHANGE UP (ref 7–60)
LYMPHOCYTES # BLD AUTO: 2.14 K/UL — SIGNIFICANT CHANGE UP (ref 1–3.3)
LYMPHOCYTES # BLD AUTO: 34.8 % — SIGNIFICANT CHANGE UP (ref 13–44)
MCHC RBC-ENTMCNC: 24.5 PG — LOW (ref 27–34)
MCHC RBC-ENTMCNC: 35.1 % — SIGNIFICANT CHANGE UP (ref 32–36)
MCV RBC AUTO: 69.8 FL — LOW (ref 80–100)
MONOCYTES # BLD AUTO: 0.32 K/UL — SIGNIFICANT CHANGE UP (ref 0–0.9)
MONOCYTES NFR BLD AUTO: 5.2 % — SIGNIFICANT CHANGE UP (ref 2–14)
MUCOUS THREADS # UR AUTO: SIGNIFICANT CHANGE UP
NEUTROPHILS # BLD AUTO: 3.43 K/UL — SIGNIFICANT CHANGE UP (ref 1.8–7.4)
NEUTROPHILS NFR BLD AUTO: 55.7 % — SIGNIFICANT CHANGE UP (ref 43–77)
NITRITE UR-MCNC: NEGATIVE — SIGNIFICANT CHANGE UP
NON-SQ EPI CELLS # UR AUTO: <1 — SIGNIFICANT CHANGE UP
NRBC # FLD: 0 — SIGNIFICANT CHANGE UP
PH UR: 6 — SIGNIFICANT CHANGE UP (ref 4.6–8)
PLATELET # BLD AUTO: 269 K/UL — SIGNIFICANT CHANGE UP (ref 150–400)
PMV BLD: 10 FL — SIGNIFICANT CHANGE UP (ref 7–13)
POTASSIUM SERPL-MCNC: 4.2 MMOL/L — SIGNIFICANT CHANGE UP (ref 3.5–5.3)
POTASSIUM SERPL-SCNC: 4.2 MMOL/L — SIGNIFICANT CHANGE UP (ref 3.5–5.3)
PROT SERPL-MCNC: 7.4 G/DL — SIGNIFICANT CHANGE UP (ref 6–8.3)
PROT UR-MCNC: 30 MG/DL — HIGH
RBC # BLD: 5.1 M/UL — SIGNIFICANT CHANGE UP (ref 3.8–5.2)
RBC # FLD: 13.2 % — SIGNIFICANT CHANGE UP (ref 10.3–14.5)
RBC CASTS # UR COMP ASSIST: SIGNIFICANT CHANGE UP (ref 0–?)
SODIUM SERPL-SCNC: 137 MMOL/L — SIGNIFICANT CHANGE UP (ref 135–145)
SP GR SPEC: 1.02 — SIGNIFICANT CHANGE UP (ref 1–1.04)
SQUAMOUS # UR AUTO: SIGNIFICANT CHANGE UP
UROBILINOGEN FLD QL: NORMAL MG/DL — SIGNIFICANT CHANGE UP
WBC # BLD: 6.15 K/UL — SIGNIFICANT CHANGE UP (ref 3.8–10.5)
WBC # FLD AUTO: 6.15 K/UL — SIGNIFICANT CHANGE UP (ref 3.8–10.5)
WBC UR QL: SIGNIFICANT CHANGE UP (ref 0–?)

## 2018-02-27 PROCEDURE — 99284 EMERGENCY DEPT VISIT MOD MDM: CPT

## 2018-02-27 RX ORDER — ACETAMINOPHEN 500 MG
1000 TABLET ORAL ONCE
Refills: 0 | Status: COMPLETED | OUTPATIENT
Start: 2018-02-27 | End: 2018-02-27

## 2018-02-27 RX ORDER — FAMOTIDINE 10 MG/ML
20 INJECTION INTRAVENOUS ONCE
Refills: 0 | Status: COMPLETED | OUTPATIENT
Start: 2018-02-27 | End: 2018-02-27

## 2018-02-27 RX ADMIN — FAMOTIDINE 20 MILLIGRAM(S): 10 INJECTION INTRAVENOUS at 23:00

## 2018-02-27 RX ADMIN — Medication 30 MILLILITER(S): at 23:00

## 2018-02-27 RX ADMIN — Medication 400 MILLIGRAM(S): at 23:00

## 2018-02-27 NOTE — ED PROVIDER NOTE - PROGRESS NOTE DETAILS
Discussed patients with Dr. Melchor (PMD) who states that patient has never displayed seeking behavior to him, he saw her in the office a few days ago and started her on bactrim.  If admission is warranted admit to Dr. Varela.  [facility] Cell [phone] Patient signed out to VIDAL Healy, pending pain control. VIDAL Healy: Pts pain is improved after bentyl, will d/c home with PMD follow up, dicyclomine 20mg three times a day for 7 days as needed for abdominal pain and to continue taking her antibiotic

## 2018-02-27 NOTE — ED ADULT NURSE NOTE - OBJECTIVE STATEMENT
pt states she has been treated for a UTI for a long time. pt states she was told recently her urine culture was still positive for bacteria. pt states she still has flank pain and now has abd pain mostly in upper rt quadrant with associated diarrhea yellowish brown in color. abd soft, tender on palpation in rt upper quadrant. pt states she has already had her gallbladder removed. denies fevers and dysuria. sl placed labs sent. pt c.o. pain and states tylenol is not going to work. agrees to try tylenol. medicated as ordered.

## 2018-02-27 NOTE — ED PROVIDER NOTE - CARE PLAN
Principal Discharge DX:	Abdominal pain  Assessment and plan of treatment:	Follow up with your primary care provider within 48 hours  Continue to take your antibiotics as prescribed to you  Take dicyclomine 20mg (2 10mg tablets) up to 3 times a day for 7 days as needed for abdominal pain  Rest and drink fluids  Return to the emergency department with any worsening symptoms, increased pain, nausea/vomiting, inability to eat or drink, fever or any other concerns.

## 2018-02-27 NOTE — ED PROVIDER NOTE - PMH
Diabetes    Hypertension    ICD (implantable cardioverter-defibrillator) in place    Intestinal cancer    Pulmonary embolism

## 2018-02-27 NOTE — ED ADULT TRIAGE NOTE - CHIEF COMPLAINT QUOTE
p/t c/o of lower back and lower abd pain for 3 days with nausea and vomiting p/t currently being treated for uti nad noted

## 2018-02-27 NOTE — ED PROVIDER NOTE - OBJECTIVE STATEMENT
Patient is a 42 year old female, PMH gastric ulcer, ICF, HF, DM2, HTN, PE in 2016 (on eliquis), "cancer" of appendix and "lower intestines" in 2014, who presents c/o 1 month of dysuria and flank pain. States she was on keflex for 2 weeks, 1 week of macrobid, and 5 days of bactrim, and yesterday she started to feel increased abdominal pain, and today she had two episodes of vomiting. Admits she was hospitalized 1 year ago for a complicated kidney infection. She denies fever, headache, chest pain, SOB, palpitations, diarrhea, vaginal bleeding/discharge.

## 2018-02-27 NOTE — ED PROVIDER NOTE - PLAN OF CARE
Follow up with your primary care provider within 48 hours  Continue to take your antibiotics as prescribed to you  Take dicyclomine 20mg (2 10mg tablets) up to 3 times a day for 7 days as needed for abdominal pain  Rest and drink fluids  Return to the emergency department with any worsening symptoms, increased pain, nausea/vomiting, inability to eat or drink, fever or any other concerns.

## 2018-02-27 NOTE — ED PROVIDER NOTE - ATTENDING CONTRIBUTION TO CARE
I was physically present for the E/M service provided. I agree with above history, physical, and plan which I have reviewed and edited where appropriate. I was physically present for the key portions of the service provided.    Attending Exam - Dr. Tamayo: GEN: well appearing, NAD  HEENT: +PERRL, EOMI  RESP: CTAB, no signs of respiratory distress CV: s1s2 RRR ABD: soft/non tender/non distended, no CVAT  MSK: no deformities / swelling, normal range of motion, spine grossly normal NEURO: alert, non focal exam SKIN: normal color / temperature / condition.    Attg MDM: abdomen non tender, no CVAT, has culture from [facility] showing sandhu sensitive klebsiella, unclear if this is UTI related, will check abdminal labs, treat for gastritis symptoms and reassess.  Also consider IBS, if labs normal consider bentyl.

## 2018-02-28 RX ADMIN — Medication 20 MILLIGRAM(S): at 01:18

## 2018-03-01 LAB
BACTERIA UR CULT: SIGNIFICANT CHANGE UP
SPECIMEN SOURCE: SIGNIFICANT CHANGE UP

## 2018-03-18 ENCOUNTER — HOSPITAL ENCOUNTER (EMERGENCY)
Facility: HOSPITAL | Age: 43
Discharge: HOME | End: 2018-03-18
Attending: EMERGENCY MEDICINE
Payer: COMMERCIAL

## 2018-03-18 VITALS
DIASTOLIC BLOOD PRESSURE: 90 MMHG | TEMPERATURE: 98.2 F | SYSTOLIC BLOOD PRESSURE: 175 MMHG | HEIGHT: 66 IN | RESPIRATION RATE: 18 BRPM | OXYGEN SATURATION: 98 % | BODY MASS INDEX: 38.57 KG/M2 | WEIGHT: 240 LBS | HEART RATE: 106 BPM

## 2018-03-18 DIAGNOSIS — R10.9 ABDOMINAL PAIN, UNSPECIFIED ABDOMINAL LOCATION: Primary | ICD-10-CM

## 2018-03-18 LAB
ALBUMIN SERPL-MCNC: 3.5 G/DL (ref 3.4–5)
ALP SERPL-CCNC: 49 IU/L (ref 35–126)
ALT SERPL-CCNC: 15 IU/L (ref 11–54)
ANION GAP SERPL CALC-SCNC: 11 MEQ/L (ref 3–15)
AST SERPL-CCNC: 27 IU/L (ref 15–41)
BACTERIA URNS QL MICRO: ABNORMAL /UL
BASOPHILS # BLD: 0.03 K/UL (ref 0.01–0.1)
BASOPHILS NFR BLD: 0.5 %
BILIRUB SERPL-MCNC: 1 MG/DL (ref 0.3–1.2)
BILIRUB UR QL STRIP.AUTO: NEGATIVE MG/DL
BUN SERPL-MCNC: 23 MG/DL (ref 8–20)
CALCIUM SERPL-MCNC: 9 MG/DL (ref 8.9–10.3)
CHLORIDE SERPL-SCNC: 101 MMOL/L (ref 98–109)
CLARITY UR REFRACT.AUTO: CLEAR
CO2 SERPL-SCNC: 20 MMOL/L (ref 22–32)
COLOR UR AUTO: YELLOW
CREAT SERPL-MCNC: 1 MG/DL (ref 0.6–1.1)
EOSINOPHIL # BLD: 0.17 K/UL (ref 0.04–0.36)
EOSINOPHIL NFR BLD: 2.8 %
ERYTHROCYTE [DISTWIDTH] IN BLOOD BY AUTOMATED COUNT: 13.9 % (ref 11.7–14.4)
GFR SERPL CREATININE-BSD FRML MDRD: >60 ML/MIN/1.73M*2
GLUCOSE BLD-MCNC: 271 MG/DL (ref 70–99)
GLUCOSE SERPL-MCNC: 282 MG/DL (ref 70–99)
GLUCOSE UR STRIP.AUTO-MCNC: ABNORMAL MG/DL
HCT VFR BLDCO AUTO: 34 % (ref 35–45)
HGB BLD-MCNC: 11.9 G/DL (ref 11.8–15.7)
HGB UR QL STRIP.AUTO: NEGATIVE
HYALINE CASTS #/AREA URNS LPF: ABNORMAL /LPF
IMM GRANULOCYTES # BLD AUTO: 0.01 K/UL (ref 0–0.08)
IMM GRANULOCYTES NFR BLD AUTO: 0.2 %
KETONES UR STRIP.AUTO-MCNC: NEGATIVE MG/DL
LEUKOCYTE ESTERASE UR QL STRIP.AUTO: NEGATIVE
LYMPHOCYTES # BLD: 2.24 K/UL (ref 1.2–3.5)
LYMPHOCYTES NFR BLD: 36.6 %
MCH RBC QN AUTO: 24.6 PG (ref 28–33.2)
MCHC RBC AUTO-ENTMCNC: 35 G/DL (ref 32.2–35.5)
MCV RBC AUTO: 70.4 FL (ref 83–98)
MONOCYTES # BLD: 0.37 K/UL (ref 0.28–0.8)
MONOCYTES NFR BLD: 6 %
NEUTROPHILS # BLD: 3.3 K/UL (ref 1.7–7)
NEUTS SEG NFR BLD: 53.9 %
NITRITE UR QL STRIP.AUTO: NEGATIVE
NRBC BLD-RTO: 0 %
PDW BLD AUTO: 10 FL (ref 9.4–12.3)
PH UR STRIP.AUTO: 6 [PH]
PLATELET # BLD AUTO: 230 K/UL (ref 150–369)
POTASSIUM SERPL-SCNC: 4.5 MMOL/L (ref 3.6–5.1)
PROT SERPL-MCNC: 6.3 G/DL (ref 6–8.2)
PROT UR QL STRIP.AUTO: 2
RBC # BLD AUTO: 4.83 M/UL (ref 3.93–5.22)
RBC #/AREA URNS HPF: ABNORMAL /HPF
SODIUM SERPL-SCNC: 132 MMOL/L (ref 136–144)
SP GR UR REFRACT.AUTO: 1.02
SQUAMOUS URNS QL MICRO: 2 /HPF
UROBILINOGEN UR STRIP-ACNC: 0.2 EU/DL
WBC # BLD AUTO: 6.12 K/UL (ref 3.8–10.5)
WBC #/AREA URNS HPF: ABNORMAL /HPF

## 2018-03-18 PROCEDURE — 99285 EMERGENCY DEPT VISIT HI MDM: CPT

## 2018-03-18 PROCEDURE — 81001 URINALYSIS AUTO W/SCOPE: CPT | Performed by: EMERGENCY MEDICINE

## 2018-03-18 PROCEDURE — 36415 COLL VENOUS BLD VENIPUNCTURE: CPT | Performed by: EMERGENCY MEDICINE

## 2018-03-18 PROCEDURE — 85025 COMPLETE CBC W/AUTO DIFF WBC: CPT | Performed by: EMERGENCY MEDICINE

## 2018-03-18 PROCEDURE — 82247 BILIRUBIN TOTAL: CPT | Performed by: EMERGENCY MEDICINE

## 2018-03-18 RX ORDER — NITROGLYCERIN 0.4 MG/1
0.4 TABLET SUBLINGUAL EVERY 5 MIN PRN
COMMUNITY

## 2018-03-18 RX ORDER — CARVEDILOL 12.5 MG/1
12.5 TABLET ORAL 2 TIMES DAILY WITH MEALS
COMMUNITY

## 2018-03-18 RX ORDER — MELOXICAM 15 MG/1
15 TABLET ORAL DAILY
Qty: 90 TABLET | Refills: 0 | Status: SHIPPED | OUTPATIENT
Start: 2018-03-18 | End: 2018-09-14

## 2018-03-18 RX ORDER — METFORMIN HYDROCHLORIDE 500 MG/1
1000 TABLET ORAL 2 TIMES DAILY WITH MEALS
COMMUNITY

## 2018-03-18 RX ORDER — MELOXICAM 7.5 MG/1
15 TABLET ORAL DAILY
Status: DISCONTINUED | OUTPATIENT
Start: 2018-03-18 | End: 2018-03-18 | Stop reason: HOSPADM

## 2018-03-18 RX ORDER — ATORVASTATIN CALCIUM 40 MG/1
40 TABLET, FILM COATED ORAL DAILY
COMMUNITY

## 2018-03-18 RX ORDER — LISINOPRIL 20 MG/1
20 TABLET ORAL DAILY
COMMUNITY

## 2018-03-18 RX ORDER — KETOROLAC TROMETHAMINE 30 MG/ML
30 INJECTION, SOLUTION INTRAMUSCULAR; INTRAVENOUS ONCE
Status: DISCONTINUED | OUTPATIENT
Start: 2018-03-18 | End: 2018-03-18 | Stop reason: HOSPADM

## 2018-03-18 ASSESSMENT — ENCOUNTER SYMPTOMS
SHORTNESS OF BREATH: 0
VOMITING: 0
NAUSEA: 0
FATIGUE: 0
SORE THROAT: 0
DIARRHEA: 1
DYSURIA: 0
ABDOMINAL PAIN: 1
PALPITATIONS: 0
HEADACHES: 0
BACK PAIN: 0
MYALGIAS: 0
COUGH: 0
CHILLS: 0
COLOR CHANGE: 0

## 2018-03-18 NOTE — ED PROVIDER NOTES
HPI     Chief Complaint   Patient presents with   • Diarrhea     Pt reports to the ED with c/o diarrhea onset 2 days ago. Pt notes she has had associated abd pain. Denies flank pain or blood stools. Pt states she had been on a course of abx 1 month ago for PNA.     History provided by:  Patient  Diarrhea   Diarrhea characteristics: Yellowish brown in color   Severity:  Moderate  Onset quality:  Sudden  Number of episodes:  Several   Duration:  3 days  Relieved by:  Nothing  Worsened by:  Nothing  Associated symptoms: abdominal pain    Associated symptoms: no chills, no headaches, no myalgias and no vomiting         Patient History     Past Medical History:   Diagnosis Date   • Cancer of appendix (CMS/HCC) (HCC)    • High cholesterol    • Hypertension    • Kidney infection    • Pulmonary embolism (CMS/HCC) (HCC)    • Type 2 diabetes mellitus (CMS/HCC) (HCC)        Past Surgical History:   Procedure Laterality Date   • CARDIAC DEFIBRILLATOR PLACEMENT     •  SECTION     • CHOLECYSTECTOMY     • HERNIA REPAIR         History reviewed. No pertinent family history.    Social History   Substance Use Topics   • Smoking status: Never Smoker   • Smokeless tobacco: Never Used   • Alcohol use No       Systems Reviewed from Nursing Triage:          Review of Systems     Review of Systems   Constitutional: Negative for chills and fatigue.   HENT: Negative for sore throat.    Respiratory: Negative for cough and shortness of breath.    Cardiovascular: Negative for chest pain and palpitations.   Gastrointestinal: Positive for abdominal pain and diarrhea. Negative for nausea and vomiting.   Genitourinary: Negative for dysuria and pelvic pain.   Musculoskeletal: Negative for back pain and myalgias.   Skin: Negative for color change and rash.   Neurological: Negative for syncope and headaches.   All other systems reviewed and are negative.       Physical Exam     ED Triage Vitals [18 1802]   Temp Heart Rate Resp BP SpO2  "  36.8 °C (98.2 °F) (!) 119 18 (!) 153/95 100 %      Temp Source Heart Rate Source Patient Position BP Location FiO2 (%) (Set)   Oral Monitor Sitting Right upper arm --                     Patient Vitals for the past 24 hrs:   BP Temp Temp src Pulse Resp SpO2 Height Weight   03/18/18 1802 (!) 153/95 36.8 °C (98.2 °F) Oral (!) 119 18 100 % 1.676 m (5' 6\") 109 kg (240 lb)           Physical Exam   Constitutional: She appears well-developed and well-nourished. No distress.   HENT:   Head: Normocephalic and atraumatic.   Eyes: Conjunctivae are normal. No scleral icterus.   Neck: Neck supple. No JVD present.   Cardiovascular: Normal rate and regular rhythm.    Pulmonary/Chest: Effort normal and breath sounds normal. No respiratory distress.   Abdominal: Soft. There is no tenderness. There is no CVA tenderness.   Musculoskeletal: She exhibits no edema.   Neurological: She is alert. She has normal strength.   Skin: Skin is warm and dry.   Psychiatric: She has a normal mood and affect. Her behavior is normal.   Nursing note and vitals reviewed.           Procedures    ED Course & MDM     Labs Reviewed   COMPREHENSIVE METABOLIC PANEL - Abnormal        Result Value    Sodium 132 (*)     CO2 20 (*)     BUN 23 (*)     Glucose 282 (*)     Potassium 4.5      Chloride 101      Creatinine 1.0      Calcium 9.0      AST (SGOT) 27      ALT (SGPT) 15      Alkaline Phosphatase 49      Total Protein 6.3      Albumin 3.5      Bilirubin, Total 1.0      eGFR >60.0      Anion Gap 11     UA REFLEX CULTURE (MACROSCOPIC) - Abnormal     Protein, Urine +2 (*)     Glucose, Urine 100 (SMALL) (*)     Color, Urine Yellow      Clarity, Urine Clear      Specific Gravity, Urine 1.020      pH, Urine 6.0      Leukocyte Esterase Negative      Nitrite, Urine Negative      Ketones, Urine Negative      Urobilinogen, Urine 0.2      Bilirubin, Urine Negative      Blood, Urine Negative     CBC - Abnormal     Hematocrit 34.0 (*)     MCV 70.4 (*)     MCH 24.6 (*)  "    WBC 6.12      RBC 4.83      Hemoglobin 11.9      MCHC 35.0      RDW 13.9      Platelets 230      MPV 10.0     POCT GLUCOSE (BEAKER) - Abnormal     POCT Bedside Glucose 271 (*)    CBC AND DIFFERENTIAL    Narrative:     The following orders were created for panel order CBC and differential.  Procedure                               Abnormality         Status                     ---------                               -----------         ------                     CBC[[id number]]                           Abnormal            Final result               Diff Count[[id number]]                                        Final result                 Please view results for these tests on the individual orders.   DIFF COUNT    nRBC 0.0      Immature Granulocytes 0.2      Neutrophils 53.9      Lymphocytes 36.6      Monocytes 6.0      Eosinophils 2.8      Basophils 0.5      Immature Granulocytes, Absolute 0.01      Neutrophils, Absolute 3.30      Lymphocytes, Absolute 2.24      Monocytes, Absolute 0.37      Eosinophils, Absolute 0.17      Basophils, Absolute 0.03     URINALYSIS REFLEX CULTURE    Narrative:     The following orders were created for panel order Urinalysis with Reflex Culture.  Procedure                               Abnormality         Status                     ---------                               -----------         ------                     UA Reflex to Culture (Mac...[[id number]]  Abnormal            Final result               UA Microscopic[[id number]]                                    In process                   Please view results for these tests on the individual orders.   UA MICROSCOPIC   POCT GLUCOSE       No orders to display           MDM  Number of Diagnoses or Management Options  Diagnosis management comments: This patient has no lab abnormalities, her abdomen is soft nontender no guarding no rebound no CVA tenderness in her urine is clear.  She does not have a surgical abdomen, and her vital signs are  normal.  I am kind of concerned because she has told me that nonsteroidals do not work for her pain and asked for some opiate-based medications.  I do not see a medical reason why she has the pain that she has right now.  I will send her home I told her I put her on more potent nonsteroidal and she can get outpatient management      Scribe Attestation  By signing my name below, I, Silvano Drew, attest that this documentation has been prepared under the direction and in the presence of Dr. Rodas.    3/18/2018 6:43 PM    I, Jesse Rodas, personally performed the services described in this documentation, as documented by the scribe in my presence, and it is both accurate and complete.  3/18/2018 7:31 PM           ED Course          Clinical Impressions as of Mar 18 1952   Abdominal pain, unspecified abdominal location     Disposition:       Silvano Drew  03/18/18 1844       Jesse Rodas DO  03/19/18 0123

## 2018-04-05 ENCOUNTER — OUTPATIENT (OUTPATIENT)
Dept: OUTPATIENT SERVICES | Facility: HOSPITAL | Age: 43
LOS: 1 days | End: 2018-04-05
Payer: COMMERCIAL

## 2018-04-05 ENCOUNTER — APPOINTMENT (OUTPATIENT)
Dept: CT IMAGING | Facility: CLINIC | Age: 43
End: 2018-04-05
Payer: COMMERCIAL

## 2018-04-05 ENCOUNTER — APPOINTMENT (OUTPATIENT)
Dept: UROGYNECOLOGY | Facility: CLINIC | Age: 43
End: 2018-04-05
Payer: COMMERCIAL

## 2018-04-05 ENCOUNTER — RESULT REVIEW (OUTPATIENT)
Age: 43
End: 2018-04-05

## 2018-04-05 VITALS — HEIGHT: 65 IN | WEIGHT: 240 LBS | BODY MASS INDEX: 39.99 KG/M2 | HEART RATE: 92 BPM

## 2018-04-05 DIAGNOSIS — Z83.3 FAMILY HISTORY OF DIABETES MELLITUS: ICD-10-CM

## 2018-04-05 DIAGNOSIS — R31.29 OTHER MICROSCOPIC HEMATURIA: ICD-10-CM

## 2018-04-05 DIAGNOSIS — Z82.49 FAMILY HISTORY OF ISCHEMIC HEART DISEASE AND OTHER DISEASES OF THE CIRCULATORY SYSTEM: ICD-10-CM

## 2018-04-05 DIAGNOSIS — Z83.49 FAMILY HISTORY OF OTHER ENDOCRINE, NUTRITIONAL AND METABOLIC DISEASES: ICD-10-CM

## 2018-04-05 DIAGNOSIS — B37.9 CANDIDIASIS, UNSPECIFIED: ICD-10-CM

## 2018-04-05 DIAGNOSIS — Z87.01 PERSONAL HISTORY OF PNEUMONIA (RECURRENT): ICD-10-CM

## 2018-04-05 DIAGNOSIS — E66.9 OBESITY, UNSPECIFIED: ICD-10-CM

## 2018-04-05 DIAGNOSIS — N39.46 MIXED INCONTINENCE: ICD-10-CM

## 2018-04-05 DIAGNOSIS — N30.90 CYSTITIS, UNSPECIFIED W/OUT HEMATURIA: ICD-10-CM

## 2018-04-05 DIAGNOSIS — Z90.49 ACQUIRED ABSENCE OF OTHER SPECIFIED PARTS OF DIGESTIVE TRACT: Chronic | ICD-10-CM

## 2018-04-05 PROBLEM — Z00.00 ENCOUNTER FOR PREVENTIVE HEALTH EXAMINATION: Noted: 2018-04-05

## 2018-04-05 LAB
BILIRUB UR QL STRIP: NORMAL
CLARITY UR: CLEAR
COLLECTION METHOD: NORMAL
GLUCOSE UR-MCNC: NORMAL
HCG UR QL: 0.2 EU/DL
HGB UR QL STRIP.AUTO: NORMAL
KETONES UR-MCNC: NORMAL
LEUKOCYTE ESTERASE UR QL STRIP: NORMAL
NITRITE UR QL STRIP: NORMAL
PH UR STRIP: 6.5
PROT UR STRIP-MCNC: 30
SP GR UR STRIP: 1.02

## 2018-04-05 PROCEDURE — 81003 URINALYSIS AUTO W/O SCOPE: CPT | Mod: 59,QW

## 2018-04-05 PROCEDURE — 74178 CT ABD&PLV WO CNTR FLWD CNTR: CPT | Mod: 26

## 2018-04-05 PROCEDURE — 51741 ELECTRO-UROFLOWMETRY FIRST: CPT

## 2018-04-05 PROCEDURE — 99204 OFFICE O/P NEW MOD 45 MIN: CPT | Mod: 25

## 2018-04-05 RX ORDER — OXYBUTYNIN CHLORIDE 5 MG/1
5 TABLET, EXTENDED RELEASE ORAL
Qty: 30 | Refills: 2 | Status: ACTIVE | COMMUNITY
Start: 2018-04-05 | End: 1900-01-01

## 2018-04-05 RX ORDER — ATORVASTATIN CALCIUM 80 MG/1
TABLET, FILM COATED ORAL
Refills: 0 | Status: ACTIVE | COMMUNITY

## 2018-04-05 RX ORDER — OMEPRAZOLE 40 MG/1
CAPSULE, DELAYED RELEASE ORAL
Refills: 0 | Status: ACTIVE | COMMUNITY

## 2018-04-05 RX ORDER — FLUCONAZOLE 150 MG/1
150 TABLET ORAL
Qty: 2 | Refills: 0 | Status: ACTIVE | COMMUNITY
Start: 2018-04-05 | End: 1900-01-01

## 2018-04-05 RX ORDER — SULFAMETHOXAZOLE AND TRIMETHOPRIM 800; 160 MG/1; MG/1
800-160 TABLET ORAL
Qty: 14 | Refills: 0 | Status: ACTIVE | COMMUNITY
Start: 2018-04-05 | End: 1900-01-01

## 2018-04-09 ENCOUNTER — RESULT REVIEW (OUTPATIENT)
Age: 43
End: 2018-04-09

## 2018-04-09 LAB — BACTERIA UR CULT: ABNORMAL

## 2018-04-11 ENCOUNTER — APPOINTMENT (OUTPATIENT)
Dept: CHRONIC DISEASE MANAGEMENT | Facility: CLINIC | Age: 43
End: 2018-04-11
Payer: COMMERCIAL

## 2018-04-11 VITALS — HEIGHT: 66 IN | BODY MASS INDEX: 38.57 KG/M2 | WEIGHT: 240 LBS

## 2018-04-11 DIAGNOSIS — E11.9 TYPE 2 DIABETES MELLITUS W/OUT COMPLICATIONS: ICD-10-CM

## 2018-04-11 PROCEDURE — 96150: CPT

## 2018-04-13 ENCOUNTER — APPOINTMENT (OUTPATIENT)
Dept: UROLOGY | Facility: CLINIC | Age: 43
End: 2018-04-13
Payer: COMMERCIAL

## 2018-04-13 DIAGNOSIS — Z87.19 PERSONAL HISTORY OF OTHER DISEASES OF THE DIGESTIVE SYSTEM: ICD-10-CM

## 2018-04-13 DIAGNOSIS — Z86.79 PERSONAL HISTORY OF OTHER DISEASES OF THE CIRCULATORY SYSTEM: ICD-10-CM

## 2018-04-13 DIAGNOSIS — N28.1 CYST OF KIDNEY, ACQUIRED: ICD-10-CM

## 2018-04-13 DIAGNOSIS — D3A.00 BENIGN CARCINOID TUMOR OF UNSPECIFIED SITE: ICD-10-CM

## 2018-04-13 PROCEDURE — 99204 OFFICE O/P NEW MOD 45 MIN: CPT

## 2018-04-13 RX ORDER — CARVEDILOL 3.12 MG/1
TABLET, FILM COATED ORAL
Refills: 0 | Status: ACTIVE | COMMUNITY

## 2018-04-13 RX ORDER — NITROFURANTOIN (MONOHYDRATE/MACROCRYSTALS) 25; 75 MG/1; MG/1
100 CAPSULE ORAL
Refills: 0 | Status: ACTIVE | COMMUNITY

## 2018-04-13 RX ORDER — METHENAMINE HIPPURATE 1 G/1
1 TABLET ORAL TWICE DAILY
Qty: 60 | Refills: 5 | Status: ACTIVE | COMMUNITY
Start: 2018-04-13 | End: 1900-01-01

## 2018-04-13 RX ORDER — ATORVASTATIN CALCIUM 40 MG/1
40 TABLET, FILM COATED ORAL
Refills: 0 | Status: ACTIVE | COMMUNITY

## 2018-04-19 ENCOUNTER — APPOINTMENT (OUTPATIENT)
Dept: UROGYNECOLOGY | Facility: CLINIC | Age: 43
End: 2018-04-19

## 2018-04-25 PROBLEM — N28.1 COMPLEX RENAL CYST: Status: ACTIVE | Noted: 2018-04-25

## 2018-05-05 ENCOUNTER — APPOINTMENT (OUTPATIENT)
Dept: CHRONIC DISEASE MANAGEMENT | Facility: CLINIC | Age: 43
End: 2018-05-05

## 2018-07-16 ENCOUNTER — APPOINTMENT (OUTPATIENT)
Dept: UROLOGY | Facility: CLINIC | Age: 43
End: 2018-07-16

## 2018-07-25 PROBLEM — E66.9 OBESITY, UNSPECIFIED OBESITY SEVERITY, UNSPECIFIED OBESITY TYPE: Status: ACTIVE | Noted: 2018-04-05

## 2018-08-08 ENCOUNTER — APPOINTMENT (OUTPATIENT)
Dept: UROLOGY | Facility: CLINIC | Age: 43
End: 2018-08-08
Payer: COMMERCIAL

## 2018-08-08 PROCEDURE — 99213 OFFICE O/P EST LOW 20 MIN: CPT

## 2018-08-08 RX ORDER — ONDANSETRON 4 MG/1
4 TABLET ORAL EVERY 6 HOURS
Qty: 15 | Refills: 0 | Status: ACTIVE | COMMUNITY
Start: 2018-08-08 | End: 1900-01-01

## 2018-08-23 ENCOUNTER — FORM ENCOUNTER (OUTPATIENT)
Age: 43
End: 2018-08-23

## 2018-08-24 ENCOUNTER — OUTPATIENT (OUTPATIENT)
Dept: OUTPATIENT SERVICES | Facility: HOSPITAL | Age: 43
LOS: 1 days | End: 2018-08-24
Payer: COMMERCIAL

## 2018-08-24 ENCOUNTER — APPOINTMENT (OUTPATIENT)
Dept: UROLOGY | Facility: CLINIC | Age: 43
End: 2018-08-24
Payer: COMMERCIAL

## 2018-08-24 ENCOUNTER — APPOINTMENT (OUTPATIENT)
Dept: ULTRASOUND IMAGING | Facility: IMAGING CENTER | Age: 43
End: 2018-08-24

## 2018-08-24 VITALS — HEART RATE: 106 BPM | SYSTOLIC BLOOD PRESSURE: 116 MMHG | RESPIRATION RATE: 16 BRPM | DIASTOLIC BLOOD PRESSURE: 83 MMHG

## 2018-08-24 DIAGNOSIS — Z00.8 ENCOUNTER FOR OTHER GENERAL EXAMINATION: ICD-10-CM

## 2018-08-24 DIAGNOSIS — Z90.49 ACQUIRED ABSENCE OF OTHER SPECIFIED PARTS OF DIGESTIVE TRACT: Chronic | ICD-10-CM

## 2018-08-24 DIAGNOSIS — R35.0 FREQUENCY OF MICTURITION: ICD-10-CM

## 2018-08-24 DIAGNOSIS — N39.0 URINARY TRACT INFECTION, SITE NOT SPECIFIED: ICD-10-CM

## 2018-08-24 PROCEDURE — 52000 CYSTOURETHROSCOPY: CPT

## 2018-08-24 PROCEDURE — 76770 US EXAM ABDO BACK WALL COMP: CPT | Mod: 26

## 2018-08-27 PROBLEM — N39.0 RECURRENT UTI: Status: ACTIVE | Noted: 2018-04-05

## 2018-08-27 LAB
APPEARANCE: ABNORMAL
BACTERIA UR CULT: NORMAL
BACTERIA: ABNORMAL
BILIRUBIN URINE: NEGATIVE
BLOOD URINE: ABNORMAL
COLOR: YELLOW
GLUCOSE QUALITATIVE U: NEGATIVE MG/DL
HYALINE CASTS: 7 /LPF
KETONES URINE: NEGATIVE
LEUKOCYTE ESTERASE URINE: NEGATIVE
MICROSCOPIC-UA: NORMAL
NITRITE URINE: NEGATIVE
PH URINE: 5.5
PROTEIN URINE: 100 MG/DL
RED BLOOD CELLS URINE: 3 /HPF
SPECIFIC GRAVITY URINE: 1.02
SQUAMOUS EPITHELIAL CELLS: 24 /HPF
UROBILINOGEN URINE: NEGATIVE MG/DL
WHITE BLOOD CELLS URINE: 10 /HPF

## 2018-08-28 DIAGNOSIS — N39.0 URINARY TRACT INFECTION, SITE NOT SPECIFIED: ICD-10-CM

## 2018-09-10 RX ORDER — MELOXICAM 15 MG/1
TABLET ORAL
Qty: 30 TABLET | Refills: 0 | OUTPATIENT
Start: 2018-09-10

## 2018-11-10 NOTE — PATIENT PROFILE ADULT. - NS PRO CONTRA FLU 1
Problem: Patient Care Overview  Goal: Plan of Care/Patient Progress Review  Discharge Planner OT   Patient plan for discharge: rehab   Current status: Pt mod A for rolling and dependently lifted to chair. Pt engaged in unsupported sitting with CGA from chair however fatigued quickly. Pt engaged in seated LE exercises for ~30 second bouts. Pt hypertensive with BPs 173/101, , and O2 sats >93%. RN aware of elevated BP.   Barriers to return to prior living situation: anxiety, medical status, deconditioning, sternal precautions   Recommendations for discharge: LTACH   Rationale for recommendations: to progress independence with ADLs and mobility.        Entered by: Clare Noble 11/10/2018 11:57 AM            out of season (available sept 1 thru apr 2 only)

## 2020-02-10 NOTE — ED ADULT NURSE NOTE - PAIN: BODY LOCATION
chest, midsternal/Right:/shoulder
45F presenting with tremors, nausea, vomiting and abdominal pain. was recently admitted for similar symptoms. likely alcohol withdrawal. ciwa 20. plan for labs, symptom control. will reassess.

## 2020-05-08 NOTE — PROGRESS NOTE ADULT - PROBLEM SELECTOR PROBLEM 4
Premature Labor    Premature labor (also called “ labor”) is when symptoms of labor occur before 37 weeks of pregnancy. (This is 3 weeks before your due date.) Premature labor can lead to premature delivery. This means giving birth to your baby early. Babies need at least 37 weeks of pregnancy for all the organs to develop normally. The earlier the delivery, the greater the risks to the baby.  In most cases, the cause of premature labor is unknown. But certain factors may make the problem more likely. These include:  · History of premature labor with other pregnancies  · Smoking  · Alcohol or substance abuse  · Low pre-pregnancy weight or weight gain during pregnancy  · Short time period between pregnancies  · Being pregnant with twins, triplets, or more  · History of certain types of surgery on the cervix or uterus  · Having a short cervix  · Certain infections  There are a number of other risk factors. Ask your healthcare provider to help you understand the risk factors specific to your case. Then find out what you can do to control or reduce them.  Contractions are one of the main signs of premature labor. A contraction is different from cramping. It may feel painful and the belly (abdomen) may get hard. It can last from a few seconds to a few minutes. Some women may feel only a sense of pressure in the belly, thighs, rectum, or vagina. Some may feel only the hardening of the uterus without pain or pressure. Or there may be a constant pain the lower back, which spreads forward toward the belly.    Premature labor can often be treated with medicines. A hospital stay will likely be needed. If labor is stopped successfully and you and your baby are both healthy, you may be discharged to continue care at home.  Home care  · Ask your provider any questions you have. Be certain you understand how to care for yourself at home. Also follow all recommendations given by your healthcare providers.  · Learn the signs  of premature labor. Watch for these signs when you get home.  · Limit or restrict activities as advised. This may include stopping certain physical activities and cutting back hours at work.  · Avoid doing any strenuous work. Ask family and friends for help with tasks and support at home, if needed.  · Don’t smoke, drink alcohol, or use other harmful substances.  · Take steps to reduce stress.  · Report any unusual symptoms to your provider.  Follow-up care  Follow up with your healthcare provider, or as directed. Weekly visits with your provider may be needed.  When to seek medical advice  Call your healthcare provider right away if any of these occur:  · Regular or frequent contractions, whether they are painful or not  · Pressure in the pelvis  · Pressure in the lower belly or mild cramping in your belly with or without diarrhea  · Constant low, dull backache  · Gush or slow leaking of water from your vagina  · Change in vaginal discharge (watery, mucus, or bloody)  · Any vaginal bleeding  · Decreased movement of your baby  © 0516-6426 [employer]. [address] [place] [address], [place] [address]. All rights reserved. This information is not intended as a substitute for professional medical care. Always follow your healthcare professional's instructions.       Urinary tract bacterial infections

## 2020-09-03 NOTE — ED PROVIDER NOTE - SKIN NEGATIVE STATEMENT, MLM
H&P [facility]#[id number] no abrasions, no jaundice, no lesions, no pruritis, and no rashes.

## 2021-03-19 ENCOUNTER — APPOINTMENT (OUTPATIENT)
Dept: URBAN - NONMETROPOLITAN AREA CLINIC 39 | Age: 46
Setting detail: DERMATOLOGY
End: 2021-03-19

## 2021-03-19 DIAGNOSIS — D22 MELANOCYTIC NEVI: ICD-10-CM

## 2021-03-19 DIAGNOSIS — L82.1 OTHER SEBORRHEIC KERATOSIS: ICD-10-CM

## 2021-03-19 DIAGNOSIS — L81.4 OTHER MELANIN HYPERPIGMENTATION: ICD-10-CM

## 2021-03-19 DIAGNOSIS — L57.0 ACTINIC KERATOSIS: ICD-10-CM

## 2021-03-19 DIAGNOSIS — D18.0 HEMANGIOMA: ICD-10-CM

## 2021-03-19 PROBLEM — D22.72 MELANOCYTIC NEVI OF LEFT LOWER LIMB, INCLUDING HIP: Status: ACTIVE | Noted: 2021-03-19

## 2021-03-19 PROBLEM — D22.5 MELANOCYTIC NEVI OF TRUNK: Status: ACTIVE | Noted: 2021-03-19

## 2021-03-19 PROBLEM — D22.71 MELANOCYTIC NEVI OF RIGHT LOWER LIMB, INCLUDING HIP: Status: ACTIVE | Noted: 2021-03-19

## 2021-03-19 PROBLEM — D18.01 HEMANGIOMA OF SKIN AND SUBCUTANEOUS TISSUE: Status: ACTIVE | Noted: 2021-03-19

## 2021-03-19 PROBLEM — D22.4 MELANOCYTIC NEVI OF SCALP AND NECK: Status: ACTIVE | Noted: 2021-03-19

## 2021-03-19 PROCEDURE — OTHER REASSURANCE: OTHER

## 2021-03-19 PROCEDURE — OTHER ADDITIONAL NOTES: OTHER

## 2021-03-19 PROCEDURE — OTHER MIPS QUALITY: OTHER

## 2021-03-19 PROCEDURE — OTHER COUNSELING: OTHER

## 2021-03-19 PROCEDURE — 99203 OFFICE O/P NEW LOW 30 MIN: CPT

## 2021-03-19 ASSESSMENT — LOCATION DETAILED DESCRIPTION DERM
LOCATION DETAILED: RIGHT ANTERIOR DISTAL THIGH
LOCATION DETAILED: RIGHT CENTRAL PARIETAL SCALP
LOCATION DETAILED: RIGHT DORSAL FOOT
LOCATION DETAILED: LEFT INFERIOR MEDIAL FOREHEAD
LOCATION DETAILED: LEFT ANTERIOR PROXIMAL UPPER ARM
LOCATION DETAILED: RIGHT ANTERIOR PROXIMAL UPPER ARM
LOCATION DETAILED: SUPERIOR THORACIC SPINE
LOCATION DETAILED: LEFT ANTERIOR DISTAL THIGH
LOCATION DETAILED: LEFT PROXIMAL PRETIBIAL REGION
LOCATION DETAILED: RIGHT MEDIAL UPPER BACK
LOCATION DETAILED: LEFT LATERAL BREAST 2-3:00 REGION
LOCATION DETAILED: LEFT DORSAL FOOT
LOCATION DETAILED: RIGHT PROXIMAL PRETIBIAL REGION
LOCATION DETAILED: PERIUMBILICAL SKIN
LOCATION DETAILED: EPIGASTRIC SKIN
LOCATION DETAILED: RIGHT SUPERIOR MEDIAL UPPER BACK
LOCATION DETAILED: LEFT INFERIOR FOREHEAD
LOCATION DETAILED: RIGHT FOREHEAD
LOCATION DETAILED: MIDDLE STERNUM
LOCATION DETAILED: LEFT MEDIAL TRAPEZIAL NECK
LOCATION DETAILED: LEFT SUPERIOR CENTRAL MALAR CHEEK
LOCATION DETAILED: LEFT MEDIAL SUPERIOR CHEST
LOCATION DETAILED: RIGHT MID-UPPER BACK

## 2021-03-19 ASSESSMENT — LOCATION SIMPLE DESCRIPTION DERM
LOCATION SIMPLE: LEFT THIGH
LOCATION SIMPLE: LEFT UPPER ARM
LOCATION SIMPLE: RIGHT FOOT
LOCATION SIMPLE: LEFT PRETIBIAL REGION
LOCATION SIMPLE: POSTERIOR NECK
LOCATION SIMPLE: RIGHT UPPER ARM
LOCATION SIMPLE: RIGHT UPPER BACK
LOCATION SIMPLE: RIGHT PRETIBIAL REGION
LOCATION SIMPLE: CHEST
LOCATION SIMPLE: UPPER BACK
LOCATION SIMPLE: SCALP
LOCATION SIMPLE: LEFT FOOT
LOCATION SIMPLE: RIGHT THIGH
LOCATION SIMPLE: LEFT FOREHEAD
LOCATION SIMPLE: ABDOMEN
LOCATION SIMPLE: LEFT CHEEK
LOCATION SIMPLE: RIGHT FOREHEAD
LOCATION SIMPLE: LEFT BREAST

## 2021-03-19 ASSESSMENT — LOCATION ZONE DERM
LOCATION ZONE: FEET
LOCATION ZONE: ARM
LOCATION ZONE: LEG
LOCATION ZONE: FACE
LOCATION ZONE: SCALP
LOCATION ZONE: TRUNK
LOCATION ZONE: NECK

## 2021-03-19 NOTE — PROCEDURE: COUNSELING
Detail Level: Detailed
Detail Level: Zone
Sunscreen Recommendations: Broad-spectrum sunscreen SPF 30 or greater daily, reapply at least every 2 hours.

## 2021-03-19 NOTE — PROCEDURE: REASSURANCE
Additional Note: Nevus measures 6mm on exam today. Patient states the nevus is painful and made worse by rubbing on clothing. Patient has [employer] insurance which requires prior authorization prior to procedures. Will submit for prior authorization to treat BAN via shave removal (CPT code: 32849). Will schedule pt back for treatment once approved. Additional Note: Nevus measures 6mm on exam today. Patient states the nevus is painful and made worse by rubbing on clothing. Patient has [employer] insurance which requires prior authorization prior to procedures. Will submit for prior authorization to treat BAN via shave removal (CPT code: 21165). Will schedule pt back for treatment once approved.

## 2021-03-19 NOTE — PROCEDURE: ADDITIONAL NOTES
Additional Notes: Patient has [employer] insurance which requires prior authorization prior to procedures. Will submit for prior authorization to treat actinic keratosis with liquid nitrogen (CPT code: 43198). Will schedule pt back for treatment once approved. Additional Notes: Patient has [employer] insurance which requires prior authorization prior to procedures. Will submit for prior authorization to treat actinic keratosis with liquid nitrogen (CPT code: 26968). Will schedule pt back for treatment once approved.

## 2021-04-06 ENCOUNTER — APPOINTMENT (OUTPATIENT)
Dept: URBAN - NONMETROPOLITAN AREA CLINIC 39 | Age: 46
Setting detail: DERMATOLOGY
End: 2021-04-06

## 2021-04-06 DIAGNOSIS — L57.0 ACTINIC KERATOSIS: ICD-10-CM

## 2021-04-06 DIAGNOSIS — D22 MELANOCYTIC NEVI: ICD-10-CM

## 2021-04-06 PROBLEM — D22.4 MELANOCYTIC NEVI OF SCALP AND NECK: Status: ACTIVE | Noted: 2021-04-06

## 2021-04-06 PROCEDURE — OTHER LIQUID NITROGEN: OTHER

## 2021-04-06 PROCEDURE — 11306 SHAVE SKIN LESION 0.6-1.0 CM: CPT

## 2021-04-06 PROCEDURE — OTHER SHAVE REMOVAL: OTHER

## 2021-04-06 PROCEDURE — 17000 DESTRUCT PREMALG LESION: CPT | Mod: 59

## 2021-04-06 PROCEDURE — OTHER MIPS QUALITY: OTHER

## 2021-04-06 PROCEDURE — OTHER COUNSELING: OTHER

## 2021-04-06 ASSESSMENT — LOCATION ZONE DERM
LOCATION ZONE: NECK
LOCATION ZONE: FACE

## 2021-04-06 ASSESSMENT — LOCATION SIMPLE DESCRIPTION DERM
LOCATION SIMPLE: LEFT CHEEK
LOCATION SIMPLE: POSTERIOR NECK

## 2021-04-06 ASSESSMENT — LOCATION DETAILED DESCRIPTION DERM
LOCATION DETAILED: LEFT SUPERIOR CENTRAL MALAR CHEEK
LOCATION DETAILED: LEFT MEDIAL TRAPEZIAL NECK

## 2021-04-06 NOTE — PROCEDURE: SHAVE REMOVAL
Bill For Surgical Tray: no
Was A Bandage Applied: Yes
Biopsy Method: Dermablade
Medical Necessity Clause: This procedure was medically necessary because the lesion that was treated was: rubbing on clothing and
Medical Necessity Information: It is in your best interest to select a reason for this procedure from the list below. All of these items fulfill various CMS LCD requirements except the new and changing color options.
Detail Level: Detailed
Consent was obtained from the patient. The risks and benefits to therapy were discussed in detail. Specifically, the risks of infection, scarring, bleeding, prolonged wound healing, incomplete removal, allergy to anesthesia, nerve injury and recurrence were addressed. Prior to the procedure, the treatment site was clearly identified and confirmed by the patient. All components of Universal Protocol/PAUSE Rule completed.
Anesthesia Type: 1% lidocaine with 1:100,000 epinephrine and a 1:10 solution of 8.4% sodium bicarbonate
Size Of Lesion In Cm (Required): 0.6
Wound Care: Vaseline
Anesthesia Volume In Cc: 1
Notification Instructions: Patient will be notified of biopsy results. However, patient instructed to call the office if not contacted within 2 weeks.
Billing Type: Third-Party Bill
Hemostasis: Jonnie's
Post-Care Instructions: I reviewed with the patient in detail post-care instructions. Patient is to keep the biopsy site dry overnight, and then apply bacitracin twice daily until healed. Patient may apply hydrogen peroxide soaks to remove any crusting.
X Size Of Lesion In Cm (Optional): 0
Path Notes (To The Dermatopathologist): 6mm

## 2021-04-06 NOTE — PROCEDURE: LIQUID NITROGEN
Duration Of Freeze Thaw-Cycle (Seconds): 5
Number Of Freeze-Thaw Cycles: 2 freeze-thaw cycles
Render Post-Care Instructions In Note?: no
Post-Care Instructions: I reviewed with the patient in detail post-care instructions. Patient is to wear sunprotection, and avoid picking at any of the treated lesions. Pt may apply Vaseline to crusted or scabbing areas.
Detail Level: Detailed
Consent: The patient's consent was obtained including but not limited to risks of crusting, scabbing, blistering, scarring, darker or lighter pigmentary change, recurrence, incomplete removal and infection.

## 2021-04-06 NOTE — HPI: SKIN LESION (ACTINIC KERATOSES)
How Severe Are They?: mild
Is This A New Presentation, Or A Follow-Up?: Actinic Keratosis
Additional History: Patient to have treated today. Prior authorization was needed prior to treatment.

## 2021-04-06 NOTE — HPI: EVALUATION OF SKIN LESION(S)
How Severe Are Your Spot(S)?: mild
Have Your Spot(S) Been Treated In The Past?: has not been treated
Hpi Title: Evaluation of a Skin Lesion
Additional History: Patient here today for removal. Prior authorization was required due to [employer] insurance.

## 2021-07-01 NOTE — H&P
History and Physical - [facility]    Patient Information: Farhad Vogel 38 y o  female MRN: [id number]  Unit/Bed#: ED 02 Encounter: [id number]  Admitting Physician: Jarret Barajas MD  PCP: No primary care provider on file  Date of Admission:  11/02/17  [facility] Problem List:     Principal Problem:    Chest pain  Active Problems:    Cardiomyopathy [facility])    Essential hypertension    Type 2 diabetes mellitus ([facility] [address] )    Pulmonary emboli (HCC)    Tachycardia    Diarrhea    CAD (coronary artery disease)      Plan:    1  Chest pain, rule out acute coronary syndrome; with history of coronary artery disease with 3 stents that was placed in 2011; with 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%; history of pulmonary embolism with poor compliance with anticoagulation (Eliquis): Admit the patient to our medical-surgical telemetry floor under observation at this point  Pain control  Nitroglycerin as needed  Oxygen as needed  Cardiology consult  Cardiovascular medications  Aspirin  Patient is also poorly compliant with aspirin  Thus the importance of being compliant with medications were stressed to the patient  Continue Eliquis  Trend patient's troponins  May need to get records and echocardiogram results at [facility] versus ordered for an echocardiogram here  According to the patient, he had an echocardiogram this year  We will ask her cardiologist about this  At this point, there is no evidence of pulmonary embolism in the main pulmonary artery, pulmonary trunk or lobar pulmonary arteries  However, we cannot totally rule out possibility of segmental and subsegmental pulmonary embolism most specially patient has been poorly compliant with her Eliquis  2   Tachycardia, likely multifactorial:  Patient told me that she has history of chronic tachycardia but it is usually in the low 100s    Patient's tachycardia right now may also be due to the fact that patient was given beta agonist and Atrovent nebulizations in the emergency room  This may be also due to some dehydration as patient had bouts of diarrhea and poor oral intake in the last 3 days  At this point in time we cannot totally rule out possibility of recurrent segmental and subsegmental pulmonary embolism given the patient is poorly compliant with anticoagulation:  I will give 1 L of gentle rate IV fluids for now and observe patient's heart rate  Continue Eliquis  Continue beta blocker  3   Recent diarrhea and poor oral intake, possibly due to acute viral gastroenteritis:  For further workup and management if diarrhea recurs  IV fluids  4   Diabetes mellitus type 2 with hyperglycemia:  Hold metformin  Check A1c levels  Lantus and insulin sliding scale  Adjust accordingly  5   Hypertension:  Continue blood pressure medications  6   Incidental finding of hepatic steatosis:  Outpatient follow with primary care physician  VTE Prophylaxis: Apixaban (Eliquis)  / sequential compression device   Code Status:  Full code  POLST: POLST form is not discussed and not completed at this time  Anticipated Length of Stay:  Patient will be admitted on an Observation basis with an anticipated length of stay of  less than 2 midnights  Justification for Hospital Stay:  Due to the above findings and plans  Total Time for Visit, including Counseling / Coordination of Care: 1 hour  Greater than 50% of this total time spent on direct patient counseling and coordination of care  Chief Complaint:     Chest pains  History of Present Illness:    Jossy Waters is a 38 y o  female who presents with chest pains to the emergency room at [facility]  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    She tells me that she usually gets her care in [place] and in [facility] in [place]  Patient told me that she has history of postpartum cardiomyopathy  This was diagnosed in 2013 when she experience chest pains and shortness of breath  This was a few months after she gave birth  At that time, her ejection fraction was found to be 30% and eventually it went down to 25%  Thus, at that time, an AICD was placed  Recently, she told me her ejection fraction even went down further to 15%  She told me that she just had a recent echocardiogram a few months ago in [place]  She also told me that in 2011, again after giving birth, patient had chest pains and shortness of breath  At that time, she was found to have coronary artery disease and 3 stents were placed  She told me that she was prescribed with aspirin since that time but she is poorly compliant with this medication  In fact, she has not taken the aspirin for several months now  In 2016, she told me that she was diagnosed with pulmonary embolism  She was prescribed with Eliquis and again patient admitted to me that she is poorly compliant with the Eliquis and that she has been missing doses  She told me that she missed her doses yesterday  Yesterday, she told me, when she went back to her hotel, she started having chest pains localized at the upper midsternal area  Patient told me that it was like a dull ache but at the same time she gesture that it was reproducible by pressing on that area  Patient told me that the pain would radiate to both of her arms but more on the left arm  Patient told me that she felt cold and clammy  Patient took her nitroglycerin last night  She was able to sleep but when she awakened, patient again experienced the chest pains  She was brought by EMS and she told me that the EMS staff told her that she was cold and clammy when she was picked up  Patient also had some shortness of breath today  Patient was given doses of albuterol and Atrovent nebulizations in the emergency room    In the emergency room, patient was found to be tachycardic in sinus rhythm  Patient also told me that for the past 3 days, she has been having diarrhea  This was associated with nausea but no actual vomiting episodes  She said the diarrhea had resolved today  Due to the diarrhea and nausea, she has been having poor oral intake/poor appetite this past 3 days also  But today, she admits that she was able to eat  Patient denies any abdominal pains  Patient denies any fever chills  Patient denies any cough or colds  Presently, feels that she has a headache  Patient denies any other symptoms other the ones mentioned above  Review of Systems:    Review of Systems     Ten point review systems done and they were negative except for the ones I mentioned in my HPI  Patient denies any dizziness  Patient denies any urinary symptoms or any bowel movement problems  Past Medical and Surgical History:     Past Medical History:   Diagnosis Date    Cardiac disease     [facility]     Hypertension     Pulmonary embolism ([facility] )        History reviewed  No pertinent surgical history  Meds/Allergies:    all medications and allergies reviewed    Allergies: Allergies   Allergen Reactions    Ciprofloxacin Anaphylaxis     History:     Marital Status: /Civil Union     History   Alcohol Use    Yes     Comment: rarely     History   Smoking Status    Never Smoker   Smokeless Tobacco    Never Used     History   Drug Use No       Family History:    History reviewed  No pertinent family history      Physical Exam:     Vitals:   Blood Pressure: 133/78 (11/02/17 1730)  Pulse: (!) 118 (11/02/17 1730)  Temperature: 99 4 °F (37 4 °C) (11/02/17 1207)  Temp Source: Oral (11/02/17 1207)  Respirations: (!) 30 (11/02/17 1730)  Height: 5' 6" (167 6 cm) (11/02/17 1207)  Weight - Scale: 109 kg (240 lb) (11/02/17 1207)  SpO2: 98 % (11/02/17 1730)    Physical Exam   Constitutional: She is oriented to person, place, and time  No distress  Obese  HENT:   Head: Normocephalic and atraumatic  Eyes: Right eye exhibits no discharge  Left eye exhibits no discharge  No scleral icterus  Neck: No JVD present  No tracheal deviation present  Cardiovascular: Regular rhythm  Exam reveals no gallop and no friction rub  No murmur heard  Tachycardic (120 to 130 per minute on telemetry)  Pulmonary/Chest: Effort normal and breath sounds normal  No stridor  No respiratory distress  She has no wheezes  She has no rales  She exhibits no tenderness  Abdominal: Soft  Bowel sounds are normal  She exhibits no distension  There is no tenderness  There is no rebound and no guarding  Musculoskeletal: She exhibits no edema, tenderness or deformity  Neurological: She is alert and oriented to person, place, and time  No cranial nerve deficit  Skin: Skin is warm  No rash noted  She is not diaphoretic  No erythema  No pallor  Psychiatric: She has a normal mood and affect  Her behavior is normal  Thought content normal    Vitals reviewed  Lab Results: I have personally reviewed pertinent reports  Results from last 7 days  Lab Units 11/02/17  1216   WBC Thousand/uL 5 77   HEMOGLOBIN g/dL 12 1   HEMATOCRIT % 35 1   PLATELETS Thousands/uL 246   NEUTROS PCT % 76*   LYMPHS PCT % 19   MONOS PCT % 4   EOS PCT % 1       Results from last 7 days  Lab Units 11/02/17  1216   SODIUM mmol/L 136   POTASSIUM mmol/L 4 1   CHLORIDE mmol/L 103   CO2 mmol/L 25   BUN mg/dL 15   CREATININE mg/dL 1 00   CALCIUM mg/dL 9 6   TOTAL PROTEIN g/dL 7 8   BILIRUBIN TOTAL mg/dL 1 34*   ALK PHOS U/L 69   ALT U/L 27   AST U/L 14   GLUCOSE RANDOM mg/dL 219*       Results from last 7 days  Lab Units 11/02/17  1216   INR  0 99       Imaging: I have personally reviewed pertinent reports  X-ray Chest 2 Views    Result Date: 11/2/2017  Narrative: CHEST DUAL ENERGY INDICATION:  No chest pain started last night   COMPARISON:  None VIEWS:  PA (including soft tissue and bone algorithms) and lateral projections; 4 images FINDINGS:     Cardiomediastinal silhouette appears unremarkable  Left single lead ICD without wire discontinuity  The lungs are clear  No pneumothorax or pleural effusion  Visualized osseous structures appear within normal limits for the patient's age  Impression: No active pulmonary disease  Workstation performed: [id number]     Aubree Banuelos Chest Pe Study    Result Date: 11/2/2017  Narrative: CTA - CHEST WITH IV CONTRAST - PULMONARY ANGIOGRAM INDICATION: Chest pain  Shortness of breath  COMPARISON: None  TECHNIQUE: CTA examination of the chest was performed using angiographic technique according to a protocol specifically tailored to evaluate for pulmonary embolism  Reformatted images were created in axial, sagittal, and coronal planes  In addition, coronal 3D MIP postprocessing was performed on the acquisition scanner  Radiation dose length product (DLP) for this visit:  533 9 mGy-cm   This examination, like all CT scans performed in the [facility], was performed utilizing techniques to minimize radiation dose exposure, including the use of iterative reconstruction and automated exposure control  IV Contrast:  85 mL of iohexol (OMNIPAQUE)      FINDINGS: Left pacer/ICD with lead tip overlying the inferior wall of the left ventricle  Enlarged left ventricle  PULMONARY ARTERIAL TREE:  No evidence of filling defect identified in the main pulmonary artery, pulmonary trunk, or lobar pulmonary arteries  Segmental and subsegmental branches are inadequately evaluated due to inadequate contrast timing bolus where I cannot exclude a embolus  LUNGS:  Lungs are clear  There is no tracheal or endobronchial lesion  PLEURA:  Unremarkable  HEART/AORTA:  Unremarkable for patient's age  MEDIASTINUM AND ANASTACIO:  Unremarkable  CHEST WALL AND LOWER NECK:       Normal  VISUALIZED STRUCTURES IN THE UPPER ABDOMEN:  Hepatic steatosis  OSSEOUS STRUCTURES: Advanced early degenerative changes to the thoracic spine  No acute fracture or destructive osseous lesion  Impression: No evidence of filling defect identified in the main pulmonary artery, pulmonary trunk, or lobar pulmonary arteries  Segmental and subsegmental branches are inadequately evaluated due to inadequate contrast timing bolus where I cannot exclude a embolus  Enlarged left ventricle  Hepatic steatosis  Workstation performed: [id number]       EKG, Pathology, and Other Studies Reviewed on Admission:   · EKG revealed sinus tachycardia at 113 per minute, with LVH by voltage criteria  Nonspecific T-wave abnormalities  Possible left atrial enlargement; poor R-wave progression from V1 to V3  No previous EKG for comparison  Allscripts Records Reviewed: No     ** Please Note: Dragon 360 Dictation voice to text software may have been used in the creation of this document   ** Eucrisa Counseling: Patient may experience a mild burning sensation during topical application. Eucrisa is not approved in children less than 2 years of age.

## 2022-03-07 NOTE — DISCHARGE NOTE ADULT - PATIENT PORTAL LINK FT
Not applicable “You can access the Follow[employer] Patient Portal, offered by [facility], by registering with the following website: [email or web address]”

## 2023-11-14 NOTE — ED ADULT NURSE NOTE - ABDOMEN
Erwin Morrison is a 21 year old female presenting to the walk-in clinic today with child for STI screening. Denies any medical complaints at this time.     Treatment tried prior to visit: n/a        Swabs/Specimens collected during triage process:  Urine  POCT preg    Erwin Morrison is a  pleasant  21 year old female     SUBJECTIVE:    Patient presents for evaluation of an abnormal vaginal discharge and or concern for STI . Symptoms have been present for no* days  Vaginal symptoms:  discharge, odor,itching NO lesion ; Contraception: IMPLANT **  She denies abnormal bleeding, blisters and bumps. Sexually transmitted infection risk/PMH / partner  *new partner 2023 **. Menstrual flow/LMP  2023 *    Katherine Obando PCP   Fevers/chills  : NO   Pelvic pain : *npo*  Urine more freq /more urgent /no hematuria   abd pain:,n/v/d/ ; back pain   no  STOOL change : no  Rash /jt px : NO     #1 Complains of neck rash for a month since seen dermatologist for severe acne states she would not putting anything new or abnormal on her neck itches, red, raw feels very dry nothing helped , is using the lotions recommended by derm  No camping / bug bite / travel/ ill exposure ; no fever/chills/joint pain   No  chest pain ;palpitations; shortness of breath; no dizziness/ no lightheadedness; normal sensorium /not confused; no numbness/ tingling; no facial asymmetry; no weakness ;no weight loss     RN notes reviewed    Problem List:    Patient Active Problem List   Diagnosis   • Asthma   • Obesity (BMI 35.0-39.9 without comorbidity)   • COVID-19 virus infection   • Gestational HTN   •  (normal spontaneous vaginal delivery)   • Third-stage postpartum hemorrhage, with delivery       Past History:   Allergies, Medications, Medical history, Surgical history, Social history and Family history were reviewed.   Past Medical History:   Diagnosis Date   • Asthma 2012   • COVID-19 virus infection 2020   • Macromastia    • Obesity      Past  Surgical History:   Procedure Laterality Date   • Breast reduction Bilateral 01/18/2022    Dr. Delgado - [facility]     Social History     Tobacco Use   • Smoking status: Never   • Smokeless tobacco: Never   Vaping Use   • Vaping Use: never used   Substance Use Topics   • Alcohol use: Yes   • Drug use: Yes     Types: Marijuana     Family History   Problem Relation Age of Onset   • Patient is unaware of any medical problems Mother    • Patient is unaware of any medical problems Father        I have reviewed the past medical history, family history, social history, medications and allergies listed in the medical record as obtained by my nursing staff and support staff and agree with their documentation.      PHYSICAL EXAM:  Blood pressure 127/83, pulse 78, temperature 98.4 °F (36.9 °C), temperature source Oral, resp. rate 18, height 5' 3\" (1.6 m), weight 103.9 kg (229 lb), last menstrual period 10/05/2023, SpO2 96 %.  Patient's last menstrual period was 10/05/2023 (exact date).    Well appearing well nourished  no distress  awake alert cooperative   Abdomen :  Soft ;no hepatosplenomegaly no guarding/ rebound/ rigidity; no CVA tenderness  Mild sp     offered chaperone / assistant ,  Asya RN  EG BUS adherent d/c , inflamed and edematous vulva, no* lesion   Vag thick ,frothy ,yellow  d/c; mild odor   Cervix tenacious d/c , odor ; frothy    Skin:  No  rash lesion change pigmentation  Neuro:   No meningismus     Walk In on 11/14/2023   Component Date Value Ref Range Status   • URINE PREGNANCY,QUAL 11/14/2023 Negative  Negative Final   • Internal Procedural Controls Accep* 11/14/2023 Yes  Yes, Invalid Results - please disregard Final   • TEST LOT NUMBER 11/14/2023 [id number]   Final   • TEST LOT EXPIRATION DATE 11/14/2023 02/14/2025   Final   • CLUE CELLS, WET MOUNT 11/14/2023 None Seen  None Seen Final   • TRICHOMONAS, WET MOUNT 11/14/2023 None Seen  None Seen Final   • YEAST, WET MOUNT 11/14/2023 None Seen  None Seen Final        NO  treatment at this time  If + GC  &/or CH , will need rocephin 500  IM  & doxy 100  BID  po x 7 d, inform partners, use condoms and re ck 2 week   recommend  serum HIV,RPR & Hep A B C       TR was wearing N95 mask and shield mask ;double gloves , PPE gown   throughout visit  Pt wearing mask      ASSESSMENT:  1. Routine screening for STI (sexually transmitted infection)    2. Acute vaginitis        PLAN:  Orders Placed This Encounter   • WET MOUNT   • SwabOne Mycoplasma Genitalium   • Chlamydia/Gonorrhea by Nucleic Acid Amplification   • HIV 1/HIV 2 Antigen/Antibody Screen   • Hepatitis Serology Panel Acute with Reflex HCV PCR   • T Pallidum IgG TPPA   • SYPT T. pallidum Total IgG/IgM Ab Reverse Syphilis Screen Algorithm   • POCT Urine pregnancy   • blood glucose (OneTouch Ultra) test strip   • phentermine 15 MG capsule   • DISCONTD: clindamycin (CLINDAGEL) 1 % gel   • clindamycin (CLINDAGEL) 1 % gel       Vaginal / vulvar symptoms     recheck vaginal swabs/culture after 14 days  : test of cure   Abstain from intercourse ×7 days  Use condoms     Dove unscented  Soap or Cetaphil     HOME MADE: Vinegar wipes  :   baby wipes , rinsed out,  Half water /half vinegar ( any type)  2-4 x /day    Probiotics 3xd x 5 d : Florostor; florigen; Acidophilus; Rolando; Apple cider vinegar    Vaginal lubricants/moisturizer /pH balance :  LUVENA or LUXIQ   (pre biotic )     - The patient is aware of antibiotic resistance and understands  will be called only  if the antibiotic needs to be changed.   -If develops fever, new symptoms or have worsening of symptoms they should return to the clinic or go to the ER.    Female contraception : ON-DEMAND :Foam/gel/suppository; \"VCF\"       PATIENT INSTRUCTIONS:   Lengthy discussion of prescription(s), rehydration, probiotics, local care ,referrals, activity precautions, plan for follow-up , refer  to AVS for specifics. See work/school note     The patient was advised to follow up with primary  physician or to recheck with the urgent care clinic sooner if symptoms get worse or if new symptoms appear.  Patient expressed understanding and appreciation for care and explanation   The \ patient  indicated understanding of the diagnosis and agreed with the plan of care.    Kate [email or web address].MD   soft/supra pubic tenderness

## 2023-12-15 NOTE — ED ADULT TRIAGE NOTE - RESPIRATORY RATE (BREATHS/MIN)
I recommend you use Differin 0.1% gel daily, you only need to use a half green pea sized amount. This is over the counter.  Discussed irritation and sun sensitivity.  Use moisturizer and SPF.  Not to be used in pregnancy.       You will also take minocycline 100 mg 2x/day.  Take with food and full glass of water and do not lie down for 30 min after taking.  If this medication gets stuck in your food pipe (esophagus) it can cause a burn.    This medication increases your chance of sunburn, so make sure to wear SPF 30 or higher every day and reapply every 2 hours if outdoors.  It can also cause nausea/stomach upset (especially if taken on empty stomach) , increased risk of yeast infections in females, dizziness and acid reflux in some people.  It can also cause a brown/gray/black pigment deposition in the skin.  This is not dangerous but does not look good.  It often starts in acne scars.   If you notice this, stop the medication as it can become permanent.     If you develop severe headaches, blurry vision, fatigue and joint pain, or yellowing of the skin, discontinue the medication and contact me.      Please let me know if you are having a problem with the medication.    The medication only helps with the inflamed acne (red bumps, pus bumps, cysts), so it is important to also use the creams prescribed.  The creams target the initial plug in the pore which is how all acne starts.     
18

## 2024-07-30 ENCOUNTER — APPOINTMENT (OUTPATIENT)
Dept: URBAN - NONMETROPOLITAN AREA CLINIC 39 | Age: 49
Setting detail: DERMATOLOGY
End: 2024-07-30

## 2024-07-30 DIAGNOSIS — L56.4 POLYMORPHOUS LIGHT ERUPTION: ICD-10-CM

## 2024-07-30 PROCEDURE — OTHER MIPS QUALITY: OTHER

## 2024-07-30 PROCEDURE — OTHER PRESCRIPTION: OTHER

## 2024-07-30 PROCEDURE — 99203 OFFICE O/P NEW LOW 30 MIN: CPT

## 2024-07-30 PROCEDURE — OTHER ADDITIONAL NOTES: OTHER

## 2024-07-30 PROCEDURE — OTHER COUNSELING: OTHER

## 2024-07-30 RX ORDER — TRIAMCINOLONE ACETONIDE 1 MG/G
CREAM TOPICAL BID
Qty: 453.6 | Refills: 1 | Status: ERX | COMMUNITY
Start: 2024-07-30

## 2024-07-30 ASSESSMENT — LOCATION ZONE DERM
LOCATION ZONE: ARM
LOCATION ZONE: TRUNK
LOCATION ZONE: LEG

## 2024-07-30 ASSESSMENT — LOCATION DETAILED DESCRIPTION DERM
LOCATION DETAILED: LEFT ANTERIOR PROXIMAL THIGH
LOCATION DETAILED: MIDDLE STERNUM
LOCATION DETAILED: RIGHT ANTERIOR LATERAL DISTAL UPPER ARM
LOCATION DETAILED: RIGHT ANTERIOR DISTAL THIGH
LOCATION DETAILED: LEFT ANTERIOR DISTAL UPPER ARM
LOCATION DETAILED: RIGHT SUPERIOR MEDIAL UPPER BACK

## 2024-07-30 ASSESSMENT — LOCATION SIMPLE DESCRIPTION DERM
LOCATION SIMPLE: RIGHT UPPER BACK
LOCATION SIMPLE: RIGHT UPPER ARM
LOCATION SIMPLE: RIGHT THIGH
LOCATION SIMPLE: LEFT THIGH
LOCATION SIMPLE: LEFT UPPER ARM
LOCATION SIMPLE: CHEST

## 2024-07-30 NOTE — PROCEDURE: ADDITIONAL NOTES
Render Risk Assessment In Note?: no
Additional Notes: Pt told to wear SPF clothing when outdoors in the sun. Consider biopsy if no improvement to R/O Lupus. patient states that she is overly sensitive to anything she eats or applies to her skin. This rash occurs yearly when exposed to sun, and is itchy.
Detail Level: Simple

## 2024-11-25 NOTE — ED PROVIDER NOTE - CONDUCTED A DETAILED DISCUSSION WITH PATIENT AND/OR GUARDIAN REGARDING, MDM
return to ED if symptoms worsen, persist or questions arise/lab results/need for outpatient follow-up DC instructions

## 2024-12-09 PROBLEM — C26.0 MALIGNANT NEOPLASM OF INTESTINAL TRACT, PART UNSPECIFIED: Chronic | Status: ACTIVE | Noted: 2018-02-27

## 2024-12-13 RX ORDER — 0.9 % SODIUM CHLORIDE 0.9 %
1000 INTRAVENOUS SOLUTION INTRAVENOUS
Refills: 0 | Status: DISCONTINUED | OUTPATIENT
Start: 2024-12-16 | End: 2024-12-16

## 2024-12-13 NOTE — ASU PATIENT PROFILE, ADULT - NSICDXPASTMEDICALHX_GEN_ALL_CORE_FT
PAST MEDICAL HISTORY:  AICD (automatic cardioverter/defibrillator) present     Anemia     Asthma     CAD (coronary artery disease) s/p 3 stents, last stent in may 2014    Cardiomyopathy     Chronic kidney disease, unspecified CKD stage     DM (diabetes mellitus)     H/O CHF     H/O Clostridium difficile infection     H/O pericarditis     Hepatitis B     HLD (hyperlipidemia)     HTN (hypertension)     Intestinal cancer     Obesity     Personal history of PE (pulmonary embolism)     Postpartum cardiomyopathy     Sleep apnea     Stented coronary artery

## 2024-12-13 NOTE — ASU PATIENT PROFILE, ADULT - NSICDXPASTSURGICALHX_GEN_ALL_CORE_FT
PAST SURGICAL HISTORY:  Benign carcinoid tumor of appendix     Coronary heart disease s/p AICD PLACEMENT    H/O  section     H/O hernia repair     History of cholecystectomy     History of thrombectomy     S/P appendectomy     S/P cardiac cath s/p stents     S/P ICD (internal cardiac defibrillator) procedure

## 2024-12-13 NOTE — ASU PATIENT PROFILE, ADULT - NS PREOP UNDERSTANDS INFO
No solid food/dairy/candy/gum after midnight Sunday; water allowed before 11:30am Monday; patient reminded to come with photo ID/insurance/credit card; dress in comfortable clothes; no jewelries/contact lens/valuable; no smoking/alcohol drinking/recreational drug use Sunday; escort to have photo ID; address and callback number was given/yes

## 2024-12-16 ENCOUNTER — OUTPATIENT (OUTPATIENT)
Dept: OUTPATIENT SERVICES | Facility: HOSPITAL | Age: 49
LOS: 1 days | Discharge: HOME | End: 2024-12-16

## 2024-12-16 ENCOUNTER — TRANSCRIPTION ENCOUNTER (OUTPATIENT)
Age: 49
End: 2024-12-16

## 2024-12-16 VITALS — WEIGHT: 226.41 LBS | HEIGHT: 66 IN

## 2024-12-16 VITALS — DIASTOLIC BLOOD PRESSURE: 90 MMHG | SYSTOLIC BLOOD PRESSURE: 155 MMHG

## 2024-12-16 DIAGNOSIS — Z98.891 HISTORY OF UTERINE SCAR FROM PREVIOUS SURGERY: Chronic | ICD-10-CM

## 2024-12-16 DIAGNOSIS — K82.0 OBSTRUCTION OF GALLBLADDER: Chronic | ICD-10-CM

## 2024-12-16 DIAGNOSIS — D3A.020 BENIGN CARCINOID TUMOR OF THE APPENDIX: Chronic | ICD-10-CM

## 2024-12-16 DIAGNOSIS — Z95.810 PRESENCE OF AUTOMATIC (IMPLANTABLE) CARDIAC DEFIBRILLATOR: Chronic | ICD-10-CM

## 2024-12-16 DIAGNOSIS — Z98.890 OTHER SPECIFIED POSTPROCEDURAL STATES: Chronic | ICD-10-CM

## 2024-12-16 DIAGNOSIS — Z90.49 ACQUIRED ABSENCE OF OTHER SPECIFIED PARTS OF DIGESTIVE TRACT: Chronic | ICD-10-CM

## 2024-12-16 DEVICE — LASER PROBE 23G CONSTELLATION: Type: IMPLANTABLE DEVICE | Site: RIGHT | Status: FUNCTIONAL

## 2024-12-16 RX ORDER — CLOPIDOGREL 75 MG/1
1 TABLET, FILM COATED ORAL
Refills: 0 | DISCHARGE

## 2024-12-16 RX ORDER — TIZANIDINE 4 MG/1
2 TABLET ORAL
Refills: 0 | DISCHARGE

## 2024-12-16 RX ORDER — PREGABALIN 75 MG/1
1 CAPSULE ORAL
Refills: 0 | DISCHARGE

## 2024-12-16 RX ORDER — 0.9 % SODIUM CHLORIDE 0.9 %
500 INTRAVENOUS SOLUTION INTRAVENOUS
Refills: 0 | Status: DISCONTINUED | OUTPATIENT
Start: 2024-12-16 | End: 2024-12-16

## 2024-12-16 RX ORDER — ACETAMINOPHEN 500MG 500 MG/1
650 TABLET, COATED ORAL ONCE
Refills: 0 | Status: DISCONTINUED | OUTPATIENT
Start: 2024-12-16 | End: 2024-12-16

## 2024-12-16 RX ORDER — EVOLOCUMAB 140 MG/ML
140 INJECTION, SOLUTION SUBCUTANEOUS
Refills: 0 | DISCHARGE

## 2024-12-16 RX ORDER — FUROSEMIDE 40 MG/1
1 TABLET ORAL
Refills: 0 | DISCHARGE

## 2024-12-16 RX ORDER — GABAPENTIN 300 MG/1
2 CAPSULE ORAL
Refills: 0 | DISCHARGE

## 2024-12-16 RX ORDER — TIRZEPATIDE 2.5 MG/.5ML
2.5 INJECTION, SOLUTION SUBCUTANEOUS
Refills: 0 | DISCHARGE

## 2024-12-16 RX ORDER — PANTOPRAZOLE SODIUM 40 MG/1
1 TABLET, DELAYED RELEASE ORAL
Refills: 0 | DISCHARGE

## 2024-12-16 RX ORDER — SACUBITRIL AND VALSARTAN 24; 26 MG/1; MG/1
1 TABLET, FILM COATED ORAL
Refills: 0 | DISCHARGE

## 2024-12-16 RX ORDER — ATROPINE SULFATE 1 %
1 DROPS OPHTHALMIC (EYE)
Refills: 0 | Status: COMPLETED | OUTPATIENT
Start: 2024-12-16 | End: 2024-12-16

## 2024-12-16 RX ORDER — HUMAN INSULIN 100 [IU]/ML
0 INJECTION, SUSPENSION SUBCUTANEOUS
Refills: 0 | DISCHARGE

## 2024-12-16 RX ADMIN — Medication 1 DROP(S): at 14:03

## 2024-12-16 RX ADMIN — Medication 1 DROP(S): at 13:57

## 2024-12-16 RX ADMIN — Medication 1 DROP(S): at 14:08

## 2024-12-16 RX ADMIN — Medication 1 DROP(S): at 14:02

## 2024-12-16 NOTE — BRIEF OPERATIVE NOTE - NSICDXBRIEFPOSTOP_GEN_ALL_CORE_FT
POST-OP DIAGNOSIS:  Vitreous hemorrhage, right eye 16-Dec-2024 16:19:26  Andrew Bae  Proliferative diabetic retinopathy of right eye 16-Dec-2024 16:20:06  Andrew Bae

## 2024-12-16 NOTE — PRE-ANESTHESIA EVALUATION ADULT - NSANTHPEFT_GEN_ALL_CORE
General: Appearance is consistent with chronological age. No abnormal facies. Morbidly obese female.   Airway:  See Mallampati score  EENT: Anicteric sclera; oropharynx clear, moist mucus membranes  Cardiovascular:  Regular rate and rhythm  Respiratory: Unlabored breathing  Neurological: Awake and alert, moves all extremities  Constitutional: MET<4

## 2024-12-16 NOTE — PACU DISCHARGE NOTE - NS MD DISCHARGE NOTE DISCHARGE
"Chief Complaint   Patient presents with     Consult     gallbladder 2.8 cm stone       Initial /86 (BP Location: Right arm, Patient Position: Chair, Cuff Size: Adult Large)  Pulse 98  Temp 97.1  F (36.2  C) (Tympanic)  Resp 16  Ht 1.753 m (5' 9\")  Wt (!) 156.5 kg (345 lb)  BMI 50.95 kg/m2 Estimated body mass index is 50.95 kg/(m^2) as calculated from the following:    Height as of this encounter: 1.753 m (5' 9\").    Weight as of this encounter: 156.5 kg (345 lb).  Medications and allergies reviewed.    Esa HAMMOND CMA    "
Home

## 2024-12-16 NOTE — BRIEF OPERATIVE NOTE - NSICDXBRIEFPROCEDURE_GEN_ALL_CORE_FT
PROCEDURES:  Mechanical vitrectomy with endolaser panretinal photocoagulation 16-Dec-2024 16:18:56  Andrew Bae

## 2024-12-16 NOTE — ASU DISCHARGE PLAN (ADULT/PEDIATRIC) - FINANCIAL ASSISTANCE
[facility] provides services at a reduced cost to those who are determined to be eligible through [facility]’s financial assistance program. Information regarding [facility]’s financial assistance program can be found by going to [email or web address] or by calling [phone].

## 2024-12-16 NOTE — BRIEF OPERATIVE NOTE - NSICDXBRIEFPREOP_GEN_ALL_CORE_FT
PRE-OP DIAGNOSIS:  Vitreous hemorrhage, right eye 16-Dec-2024 16:19:12  Andrew Bae  Proliferative diabetic retinopathy of right eye 16-Dec-2024 16:20:23  Andrew Bae

## 2024-12-16 NOTE — OPERATIVE REPORT - OPERATIVE RPOSRT DETAILS
Date of Procedure: 12/16/2024    Surgeon:  Andrew Bae Jr., MD    Pre-operative diagnosis: Vitreous hemorrhage and proliferative diabetic retinopathy right eye    Post-operative diagnosis: Vitreous hemorrhage and proliferative diabetic retinopathy right eye    Procedure: Vitrectomy with panretinal photocoagulation    Complications: none    Estimated blood loss: 0    Anesthesia:MAC    The patient was brought into the operating room and given sedation by the anesthesiologist. The patient was given a retrobulbar injection composed of 4 cc lidocaine 4% and 1 cc marcaine 0.75%. Three 23 gauge trochars were inserted into the eye. A vitrectomy was performed using a wide field viewing system. Panretinal photocoagulation was performed anteriorly for 360 degrees.     At the end off the procedure the trochars were removed. The pressure remained physiologic. Antibiotic/steroid ointment was applied and the eye was patched and a shield was placed over the eye. The patient left the OR in good condition.

## (undated) DEVICE — GLV 7.5 PROTEXIS (WHITE)

## (undated) DEVICE — SOL IRR BAL SALT 500ML

## (undated) DEVICE — CANNULA ALCON SOFT TIP 23G

## (undated) DEVICE — LENS VITRECTOMY FLAT

## (undated) DEVICE — DRAPE MICROSCOPE KNOB COVER SMALL (2 PCS)

## (undated) DEVICE — PACK VITRECTOMY  LF